# Patient Record
Sex: MALE | Race: WHITE | NOT HISPANIC OR LATINO | URBAN - METROPOLITAN AREA
[De-identification: names, ages, dates, MRNs, and addresses within clinical notes are randomized per-mention and may not be internally consistent; named-entity substitution may affect disease eponyms.]

---

## 2021-09-08 ENCOUNTER — OFFICE VISIT (OUTPATIENT)
Dept: FAMILY MEDICINE | Facility: CLINIC | Age: 31
End: 2021-09-08

## 2021-09-08 ENCOUNTER — TRANSFERRED RECORDS (OUTPATIENT)
Dept: FAMILY MEDICINE | Facility: CLINIC | Age: 31
End: 2021-09-08

## 2021-09-08 VITALS
TEMPERATURE: 97.6 F | SYSTOLIC BLOOD PRESSURE: 120 MMHG | OXYGEN SATURATION: 96 % | DIASTOLIC BLOOD PRESSURE: 76 MMHG | HEIGHT: 70 IN | HEART RATE: 85 BPM | BODY MASS INDEX: 29.46 KG/M2 | WEIGHT: 205.8 LBS

## 2021-09-08 DIAGNOSIS — S46.011A TRAUMATIC INCOMPLETE TEAR OF RIGHT ROTATOR CUFF, INITIAL ENCOUNTER: ICD-10-CM

## 2021-09-08 DIAGNOSIS — M54.50 CHRONIC BILATERAL LOW BACK PAIN WITHOUT SCIATICA: ICD-10-CM

## 2021-09-08 DIAGNOSIS — F33.1 MODERATE EPISODE OF RECURRENT MAJOR DEPRESSIVE DISORDER (H): ICD-10-CM

## 2021-09-08 DIAGNOSIS — R29.5 TRANSIENT PARALYSIS: ICD-10-CM

## 2021-09-08 DIAGNOSIS — F43.10 PTSD (POST-TRAUMATIC STRESS DISORDER): ICD-10-CM

## 2021-09-08 DIAGNOSIS — G89.29 CHRONIC BILATERAL LOW BACK PAIN WITHOUT SCIATICA: ICD-10-CM

## 2021-09-08 DIAGNOSIS — G47.00 INSOMNIA, UNSPECIFIED TYPE: ICD-10-CM

## 2021-09-08 DIAGNOSIS — F41.1 GENERALIZED ANXIETY DISORDER: ICD-10-CM

## 2021-09-08 DIAGNOSIS — J45.20 MILD INTERMITTENT ASTHMA WITHOUT COMPLICATION: ICD-10-CM

## 2021-09-08 DIAGNOSIS — F90.2 ADHD (ATTENTION DEFICIT HYPERACTIVITY DISORDER), COMBINED TYPE: ICD-10-CM

## 2021-09-08 DIAGNOSIS — Z01.818 PREOP GENERAL PHYSICAL EXAM: Primary | ICD-10-CM

## 2021-09-08 PROBLEM — Z76.89 HEALTH CARE HOME: Status: ACTIVE | Noted: 2021-09-08

## 2021-09-08 PROBLEM — Z71.89 ACP (ADVANCE CARE PLANNING): Status: ACTIVE | Noted: 2021-09-08

## 2021-09-08 LAB
BUN SERPL-MCNC: 15 MG/DL (ref 7–25)
BUN/CREATININE RATIO: 9.7 (ref 6–22)
CALCIUM SERPL-MCNC: 9.4 MG/DL (ref 8.6–10.3)
CHLORIDE SERPLBLD-SCNC: 102.4 MMOL/L (ref 98–110)
CO2 SERPL-SCNC: 28 MMOL/L (ref 20–32)
CREAT SERPL-MCNC: 1.54 MG/DL (ref 0.6–1.3)
ERYTHROCYTE [DISTWIDTH] IN BLOOD BY AUTOMATED COUNT: 14.6 %
GFR SERPL CREATININE-BSD FRML MDRD: 59 ML/MIN/1.73M2
GLUCOSE SERPL-MCNC: 88 MG/DL (ref 60–99)
HCT VFR BLD AUTO: 49.1 % (ref 40–53)
HEMOGLOBIN: 16.5 G/DL (ref 13.3–17.7)
MCH RBC QN AUTO: 31.7 PG (ref 26–33)
MCHC RBC AUTO-ENTMCNC: 33.6 G/DL (ref 31–36)
MCV RBC AUTO: 94.5 FL (ref 78–100)
PLATELET COUNT - QUEST: 303 10^9/L (ref 150–375)
POTASSIUM SERPL-SCNC: 4.11 MMOL/L (ref 3.5–5.3)
RBC # BLD AUTO: 5.2 10*12/L (ref 4.4–5.9)
SODIUM SERPL-SCNC: 139.9 MMOL/L (ref 135–146)
WBC # BLD AUTO: 8.8 10*9/L (ref 4–11)

## 2021-09-08 PROCEDURE — 36415 COLL VENOUS BLD VENIPUNCTURE: CPT | Performed by: PHYSICIAN ASSISTANT

## 2021-09-08 PROCEDURE — 85027 COMPLETE CBC AUTOMATED: CPT | Performed by: PHYSICIAN ASSISTANT

## 2021-09-08 PROCEDURE — 99203 OFFICE O/P NEW LOW 30 MIN: CPT | Performed by: PHYSICIAN ASSISTANT

## 2021-09-08 PROCEDURE — 80048 BASIC METABOLIC PNL TOTAL CA: CPT | Performed by: PHYSICIAN ASSISTANT

## 2021-09-08 RX ORDER — PRAZOSIN HYDROCHLORIDE 1 MG/1
1 CAPSULE ORAL AT BEDTIME
Status: ON HOLD | COMMUNITY
Start: 2021-09-08 | End: 2022-08-29

## 2021-09-08 RX ORDER — CLONAZEPAM 1 MG/1
1 TABLET ORAL 2 TIMES DAILY PRN
Qty: 180 TABLET | Status: ON HOLD | COMMUNITY
Start: 2021-09-08 | End: 2022-07-16

## 2021-09-08 RX ORDER — DEXTROAMPHETAMINE SACCHARATE, AMPHETAMINE ASPARTATE MONOHYDRATE, DEXTROAMPHETAMINE SULFATE AND AMPHETAMINE SULFATE 5; 5; 5; 5 MG/1; MG/1; MG/1; MG/1
20 CAPSULE, EXTENDED RELEASE ORAL DAILY
Refills: 0 | Status: ON HOLD | COMMUNITY
Start: 2021-09-08 | End: 2022-07-16

## 2021-09-08 RX ORDER — METHYLPHENIDATE HYDROCHLORIDE 10 MG/1
CAPSULE, EXTENDED RELEASE ORAL
Refills: 0 | Status: CANCELLED | COMMUNITY
Start: 2021-09-08

## 2021-09-08 ASSESSMENT — MIFFLIN-ST. JEOR: SCORE: 1886.81
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
## 2021-09-12 ENCOUNTER — TELEPHONE (OUTPATIENT)
Dept: URGENT CARE | Facility: URGENT CARE | Age: 31
End: 2021-09-12

## 2021-09-12 ENCOUNTER — OFFICE VISIT (OUTPATIENT)
Dept: URGENT CARE | Facility: URGENT CARE | Age: 31
End: 2021-09-12
Payer: COMMERCIAL

## 2021-09-12 VITALS
TEMPERATURE: 98.3 F | OXYGEN SATURATION: 97 % | RESPIRATION RATE: 26 BRPM | HEART RATE: 91 BPM | DIASTOLIC BLOOD PRESSURE: 76 MMHG | SYSTOLIC BLOOD PRESSURE: 144 MMHG

## 2021-09-12 DIAGNOSIS — Z87.898 HX OF FEVER: Primary | ICD-10-CM

## 2021-09-12 LAB — SARS-COV-2 RNA RESP QL NAA+PROBE: POSITIVE

## 2021-09-12 PROCEDURE — 99213 OFFICE O/P EST LOW 20 MIN: CPT | Performed by: FAMILY MEDICINE

## 2021-09-12 PROCEDURE — U0003 INFECTIOUS AGENT DETECTION BY NUCLEIC ACID (DNA OR RNA); SEVERE ACUTE RESPIRATORY SYNDROME CORONAVIRUS 2 (SARS-COV-2) (CORONAVIRUS DISEASE [COVID-19]), AMPLIFIED PROBE TECHNIQUE, MAKING USE OF HIGH THROUGHPUT TECHNOLOGIES AS DESCRIBED BY CMS-2020-01-R: HCPCS | Performed by: FAMILY MEDICINE

## 2021-09-12 PROCEDURE — U0005 INFEC AGEN DETEC AMPLI PROBE: HCPCS | Performed by: FAMILY MEDICINE

## 2021-09-13 NOTE — TELEPHONE ENCOUNTER
"Attempted to call pt, he seemed quite disturbed about me calling at this time as he questioned what time zone I was located in. I mentioned to him after I have him verify his information we can discuss why I'm calling, he did verify himself. He mentioned \"oh I can't have my surgery then i'll have to retest and get it done another time, can I get a retest like tomorrow?\" I told him for any other concerns I couldn't answer he would need to contact his doctor. 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 He was very disruptive in interrupting me while I was trying to talk, he mentioned he feels great \"sounded congested on the phone\" didn't get to asking him anymore questions, as he stated this is a bad time to talk. To notate his account for our dept to return a call to him tomorrow. Ended call.  "

## 2021-09-13 NOTE — TELEPHONE ENCOUNTER
Coronavirus (COVID-19) Notification    Reason for call  Notify of POSITIVE  COVID-19 lab result, assess symptoms,  review [facility] recommendations    Lab Result   Lab test for 2019-nCoV rRt-PCR or SARS-COV-2 PCR  Oropharyngeal AND/OR nasopharyngeal swabs were POSITIVE for 2019-nCoV RNA [OR] SARS-COV-2 RNA (COVID-19) RNA     We have been unable to reach Patient by phone at this time to notify of their Positive COVID-19 result.  Left voicemail message requesting a call back to [phone] [facility] for results.        POSITIVE COVID-19 Letter sent.    Audra Almonte LPN
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
## 2021-10-17 ENCOUNTER — HEALTH MAINTENANCE LETTER (OUTPATIENT)
Age: 31
End: 2021-10-17

## 2021-12-22 ENCOUNTER — HOSPITAL ENCOUNTER (EMERGENCY)
Facility: CLINIC | Age: 31
Discharge: HOME | End: 2021-12-22
Attending: EMERGENCY MEDICINE | Admitting: EMERGENCY MEDICINE
Payer: COMMERCIAL

## 2021-12-22 ENCOUNTER — APPOINTMENT (OUTPATIENT)
Dept: GENERAL RADIOLOGY | Facility: CLINIC | Age: 31
End: 2021-12-22
Attending: EMERGENCY MEDICINE
Payer: COMMERCIAL

## 2021-12-22 VITALS
TEMPERATURE: 98.8 F | HEART RATE: 101 BPM | DIASTOLIC BLOOD PRESSURE: 85 MMHG | SYSTOLIC BLOOD PRESSURE: 142 MMHG | RESPIRATION RATE: 16 BRPM | OXYGEN SATURATION: 99 %

## 2021-12-22 DIAGNOSIS — Z98.890 S/P RIGHT ROTATOR CUFF REPAIR: ICD-10-CM

## 2021-12-22 DIAGNOSIS — Z51.89 VISIT FOR WOUND CHECK: ICD-10-CM

## 2021-12-22 PROCEDURE — 70140 X-RAY EXAM OF FACIAL BONES: CPT

## 2021-12-22 PROCEDURE — 250N000013 HC RX MED GY IP 250 OP 250 PS 637: Performed by: EMERGENCY MEDICINE

## 2021-12-22 PROCEDURE — 99283 EMERGENCY DEPT VISIT LOW MDM: CPT | Mod: 25

## 2021-12-22 PROCEDURE — 10120 INC&RMVL FB SUBQ TISS SMPL: CPT

## 2021-12-22 RX ORDER — ACETAMINOPHEN 500 MG
1000 TABLET ORAL ONCE
Status: COMPLETED | OUTPATIENT
Start: 2021-12-22 | End: 2021-12-22

## 2021-12-22 RX ORDER — LIDOCAINE HYDROCHLORIDE AND EPINEPHRINE 10; 10 MG/ML; UG/ML
INJECTION, SOLUTION INFILTRATION; PERINEURAL
Status: DISCONTINUED
Start: 2021-12-22 | End: 2021-12-22 | Stop reason: HOSPADM

## 2021-12-22 RX ORDER — OXYCODONE HYDROCHLORIDE 5 MG/1
5 TABLET ORAL ONCE
Status: COMPLETED | OUTPATIENT
Start: 2021-12-22 | End: 2021-12-22

## 2021-12-22 RX ADMIN — ACETAMINOPHEN 1000 MG: 500 TABLET, FILM COATED ORAL at 02:42

## 2021-12-22 RX ADMIN — OXYCODONE HYDROCHLORIDE 5 MG: 5 TABLET ORAL at 03:43

## 2021-12-22 ASSESSMENT — ENCOUNTER SYMPTOMS: WOUND: 1
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
## 2022-02-04 ENCOUNTER — HOSPITAL ENCOUNTER (EMERGENCY)
Facility: CLINIC | Age: 32
Discharge: HOME | End: 2022-02-04
Payer: COMMERCIAL

## 2022-02-04 ENCOUNTER — HOSPITAL ENCOUNTER (EMERGENCY)
Facility: CLINIC | Age: 32
Discharge: HOME | End: 2022-02-04
Attending: EMERGENCY MEDICINE | Admitting: EMERGENCY MEDICINE
Payer: COMMERCIAL

## 2022-02-04 VITALS
SYSTOLIC BLOOD PRESSURE: 158 MMHG | OXYGEN SATURATION: 99 % | BODY MASS INDEX: 28.63 KG/M2 | HEIGHT: 70 IN | TEMPERATURE: 98 F | WEIGHT: 200 LBS | RESPIRATION RATE: 16 BRPM | DIASTOLIC BLOOD PRESSURE: 92 MMHG | HEART RATE: 99 BPM

## 2022-02-04 DIAGNOSIS — R11.0 NAUSEA: Primary | ICD-10-CM

## 2022-02-04 PROCEDURE — 99282 EMERGENCY DEPT VISIT SF MDM: CPT | Performed by: EMERGENCY MEDICINE

## 2022-02-04 ASSESSMENT — ENCOUNTER SYMPTOMS
FLANK PAIN: 0
WOUND: 0
ABDOMINAL PAIN: 0
NECK PAIN: 0
FREQUENCY: 0
CONFUSION: 0
SHORTNESS OF BREATH: 0
HEMATURIA: 0
COUGH: 0
BACK PAIN: 0
BRUISES/BLEEDS EASILY: 0
NAUSEA: 1
APPETITE CHANGE: 0
FEVER: 0
FATIGUE: 0
PALPITATIONS: 0
NUMBNESS: 0
COLOR CHANGE: 0
VOMITING: 0
RHINORRHEA: 0
DYSURIA: 0
DIARRHEA: 0
LIGHT-HEADEDNESS: 0
CONSTIPATION: 0
HEADACHES: 0
CHILLS: 0
WEAKNESS: 0

## 2022-02-04 ASSESSMENT — MIFFLIN-ST. JEOR: SCORE: 1863.44
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
## 2022-02-05 NOTE — ED NOTES
"Pt Requesting to speak with provider, denies wanting to speak with a mental health  about housing situation. States he is here because he \"does not feel right\" and wants a medical evaluation  "
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
## 2022-02-05 NOTE — ED NOTES
Patient expressed that he'd like to go to a different hospital after speaking with provider. MD notified.

## 2022-02-05 NOTE — ED NOTES
MD notified that patient is back again after being discharged. MD medically evaluated and stated that we can offer a DEC eval if patient needs further help

## 2022-02-06 ENCOUNTER — HEALTH MAINTENANCE LETTER (OUTPATIENT)
Age: 32
End: 2022-02-06

## 2022-04-21 ENCOUNTER — MEDICAL CORRESPONDENCE (OUTPATIENT)
Dept: HEALTH INFORMATION MANAGEMENT | Facility: CLINIC | Age: 32
End: 2022-04-21

## 2022-04-22 ENCOUNTER — MEDICAL CORRESPONDENCE (OUTPATIENT)
Dept: HEALTH INFORMATION MANAGEMENT | Facility: CLINIC | Age: 32
End: 2022-04-22

## 2022-05-04 ENCOUNTER — MEDICAL CORRESPONDENCE (OUTPATIENT)
Dept: HEALTH INFORMATION MANAGEMENT | Facility: CLINIC | Age: 32
End: 2022-05-04

## 2022-06-14 ENCOUNTER — MEDICAL CORRESPONDENCE (OUTPATIENT)
Dept: HEALTH INFORMATION MANAGEMENT | Facility: CLINIC | Age: 32
End: 2022-06-14

## 2022-07-14 ENCOUNTER — HOSPITAL ENCOUNTER (INPATIENT)
Facility: CLINIC | Age: 32
LOS: 44 days | Discharge: PSYCHIATRIC OR SUBSTANCE USE FACILITY | DRG: 897 | End: 2022-08-29
Attending: EMERGENCY MEDICINE | Admitting: PSYCHIATRY & NEUROLOGY
Payer: MEDICARE

## 2022-07-14 ENCOUNTER — TELEPHONE (OUTPATIENT)
Dept: BEHAVIORAL HEALTH | Facility: CLINIC | Age: 32
End: 2022-07-14

## 2022-07-14 DIAGNOSIS — F33.1 MAJOR DEPRESSIVE DISORDER, RECURRENT EPISODE, MODERATE (H): ICD-10-CM

## 2022-07-14 DIAGNOSIS — F41.1 GENERALIZED ANXIETY DISORDER: ICD-10-CM

## 2022-07-14 DIAGNOSIS — G89.29 CHRONIC BILATERAL LOW BACK PAIN WITHOUT SCIATICA: ICD-10-CM

## 2022-07-14 DIAGNOSIS — F03.92: ICD-10-CM

## 2022-07-14 DIAGNOSIS — F43.10 PTSD (POST-TRAUMATIC STRESS DISORDER): ICD-10-CM

## 2022-07-14 DIAGNOSIS — M54.50 CHRONIC BILATERAL LOW BACK PAIN WITHOUT SCIATICA: ICD-10-CM

## 2022-07-14 DIAGNOSIS — K21.9 GASTROESOPHAGEAL REFLUX DISEASE WITHOUT ESOPHAGITIS: ICD-10-CM

## 2022-07-14 DIAGNOSIS — F29 PSYCHOSIS, UNSPECIFIED PSYCHOSIS TYPE (H): ICD-10-CM

## 2022-07-14 DIAGNOSIS — F33.1 MODERATE EPISODE OF RECURRENT MAJOR DEPRESSIVE DISORDER (H): ICD-10-CM

## 2022-07-14 DIAGNOSIS — J45.20 MILD INTERMITTENT ASTHMA WITHOUT COMPLICATION: ICD-10-CM

## 2022-07-14 DIAGNOSIS — F14.222: ICD-10-CM

## 2022-07-14 DIAGNOSIS — G47.00 INSOMNIA, UNSPECIFIED TYPE: Primary | ICD-10-CM

## 2022-07-14 DIAGNOSIS — F11.20 UNCOMPLICATED OPIOID DEPENDENCE (H): ICD-10-CM

## 2022-07-14 LAB
AMPHETAMINES UR QL SCN: ABNORMAL
ATRIAL RATE - MUSE: 108 BPM
BARBITURATES UR QL SCN: ABNORMAL
BENZODIAZ UR QL SCN: ABNORMAL
BZE UR QL SCN: ABNORMAL
CANNABINOIDS UR QL SCN: ABNORMAL
DIASTOLIC BLOOD PRESSURE - MUSE: NORMAL MMHG
INTERPRETATION ECG - MUSE: NORMAL
OPIATES UR QL SCN: ABNORMAL
P AXIS - MUSE: 70 DEGREES
PR INTERVAL - MUSE: 128 MS
QRS DURATION - MUSE: 86 MS
QT - MUSE: 464 MS
QTC - MUSE: 621 MS
R AXIS - MUSE: 13 DEGREES
SARS-COV-2 RNA RESP QL NAA+PROBE: NEGATIVE
SYSTOLIC BLOOD PRESSURE - MUSE: NORMAL MMHG
T AXIS - MUSE: 65 DEGREES
VENTRICULAR RATE- MUSE: 108 BPM

## 2022-07-14 PROCEDURE — 250N000013 HC RX MED GY IP 250 OP 250 PS 637: Performed by: EMERGENCY MEDICINE

## 2022-07-14 PROCEDURE — 90791 PSYCH DIAGNOSTIC EVALUATION: CPT

## 2022-07-14 PROCEDURE — 80307 DRUG TEST PRSMV CHEM ANLYZR: CPT | Performed by: EMERGENCY MEDICINE

## 2022-07-14 PROCEDURE — U0005 INFEC AGEN DETEC AMPLI PROBE: HCPCS | Performed by: EMERGENCY MEDICINE

## 2022-07-14 RX ORDER — OLANZAPINE 5 MG/1
5 TABLET, ORALLY DISINTEGRATING ORAL ONCE
Status: DISCONTINUED | OUTPATIENT
Start: 2022-07-14 | End: 2022-07-16

## 2022-07-14 RX ORDER — DIAZEPAM 5 MG
5 TABLET ORAL ONCE
Status: COMPLETED | OUTPATIENT
Start: 2022-07-14 | End: 2022-07-14

## 2022-07-14 RX ORDER — ACETAMINOPHEN 500 MG
1000 TABLET ORAL ONCE
Status: COMPLETED | OUTPATIENT
Start: 2022-07-14 | End: 2022-07-14

## 2022-07-14 RX ADMIN — ACETAMINOPHEN 1000 MG: 500 TABLET ORAL at 10:00

## 2022-07-14 RX ADMIN — DIAZEPAM 5 MG: 5 TABLET ORAL at 12:08

## 2022-07-14 RX ADMIN — DIAZEPAM 5 MG: 5 TABLET ORAL at 16:18
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
## 2022-07-14 NOTE — CONSULTS
"Diagnostic Evaluation Consultation  Crisis Assessment    Patient was assessed: remote  Patient location: [place]  Was a release of information signed: No. Reason: patient too altered      Referral Data and Chief Complaint  Patient  is a  31 year old, who uses he/him pronouns, and presents to the ED via EMS. Patient is referred to the ED by other: police/EMS. Patient is presenting to the ED for the following concerns: patient was observed wandering and highly anxious in a local drugs store and then into traffic, civilians called 911, and patient brought to the ED for further assessment.      Informed Consent and Assessment Methods     Patient is his own guardian. Writer met with patient and explained the crisis assessment process, including applicable information disclosures and limits to confidentiality, assessed understanding of the process, and obtained consent to proceed with the assessment. Patient was observed to be able to participate in the assessment as evidenced by his ability to verbally confirm. Assessment methods included conducting a formal interview with patient, review of medical records, collaboration with medical staff, and obtaining relevant collateral information from family and community providers when available..     Over the course of this crisis assessment provided reassurance and offered validation. Patient's response to interventions was ambivalent.     Summary of Patient Situation    Patient presents as being highly anxious and paranoid while being unable to converse for most of this evaluation. His eyes are rapidly scanning the entire room, he is tearful, vigorously rubbing his head, and struggles to respond coherently.     There is no record of his having crisis/emergency supports previously, and patient states \"I am fine, I just have a lot on my mind.\"    Brief Psychosocial History    Due to patient's state, limited information is available. It is unknown where he lives, facesheet " states he works at [employer], and patient does not indicate that there are legal or spiritual issues at play here.     Significant Clinical History    Health record indicates a history of bipolar II, PTSD, psychosis, polysubstance abuse, depression and anxiety. He currently has a psychiatrist and does not reveal the origin of his PTSD other than sharing that it is historical. Denies prior admissions and does not have a OP tx at this time.      Collateral Information    Attempts were made to call patient's mother, with no return call     Risk Assessment  ESS-6  1.a. Over the past 2 weeks, have you had thoughts of killing yourself? No  1.b. Have you ever attempted to kill yourself and, if yes, when did this last happen? No   2. Recent or current suicide plan? No   3. Recent or current intent to act on ideation? No  4. Lifetime psychiatric hospitalization? No  5. Pattern of excessive substance use? Yes  6. Current irritability, agitation, or aggression? Yes  Scoring note: BOTH 1a and 1b must be yes for it to score 1 point, if both are not yes it is zero. All others are 1 point per number. If all questions 1a/1b - 6 are no, risk is negligible. If one of 1a/1b is yes, then risk is mild. If either question 2 or 3, but not both, is yes, then risk is automatically moderate regardless of total score. If both 2 and 3 are yes, risk is automatically high regardless of total score.      Score: 2, high risk      Does the patient have access to lethal means? No     Does the patient engage in non-suicidal self-injurious behavior (NSSI/SIB)? no     Does the patient have thoughts of harming others? No     Is the patient engaging in sexually inappropriate behavior?  no        Current Substance Abuse     Is there recent substance abuse? Patient has a hx of heroin, cannabis and opiod abuse. Currently he presents with cocaine and amphetamine, per lab results     Was a urine drug screen or blood alcohol level obtained: Yes see above        Mental Status Exam     Affect: Labile   Appearance: Disheveled    Attention Span/Concentration: Inattentive  Eye Contact: Avoidant   Fund of Knowledge: Delayed    Language /Speech Content: Expressive Speech   Language /Speech Volume: Soft    Language /Speech Rate/Productions: Pressured    Recent Memory: Poor   Remote Memory: Poor   Mood: Anxious, Depressed and Irritable    Orientation to Person: No    Orientation to Place: No   Orientation to Time of Day: Yes    Orientation to Date: Yes    Situation (Do they understand why they are here?): No    Psychomotor Behavior: Hyperactive    Thought Content: Hallucinations, Paranoia and Suicidal   Thought Form: Loose Associations and Tangential      History of commitment: No     Medication    Psychotropic medications: Yes. Pt is currently taking valium, per patient report. Medication compliant: Yes. Recent medication changes: No  Medication changes made in the last two weeks: No       Current Care Team    Primary Care Provider: No  Psychiatrist: Yes. Name: Erinn Maradiaga. [facility] Date of last visit: unknown. Frequency: unknown. Perceived helpfulness: unknown.  Therapist: No  : No  CTSS or ARMHS: No  ACT Team: No  Other: No    Diagnosis    Substance-Related & Addictive Disorders 292.89 Stimulant Intoxication,  292.89 Cocaine, With perceptual disturbances:  (F14.222) With use disorder, moderate or severe   296.32 (F33.1) Major Depressive Disorder, Recurrent Episode, Moderate _ and With anxious distress - by history   Brief psychotic disorder, paranoia  Generalized anxiety, by hx  PTSD      Clinical Summary and Substantiation of Recommendations    Patient presents with a combination of MI/CD features that offer acute risk as evidenced by his compromised cognition, potential suicide attempt or lack of regard for safety of self, and continued polysubstance abuse. He also admits to self-administering testosterone, without prescription, for non-disclosed  benefits. While it is unclear at this time which variables have the most impact on patient's presentation, regardless he is of high risk, cannot process clearly, is tearful and tangential, and cannot contract for safety.     Disposition    Recommended disposition: Inpatient Mental Health       Reviewed case and recommendations with attending provider. Attending Name: Dr. Magallon       Attending concurs with disposition: Yes       Patient concurs with disposition: No: prefers discharge, on ZAKIYA, will be shifted to 72 hour hold if needed       Guardian concurs with disposition: NA      Final disposition: Inpatient mental health .     Inpatient Details (if applicable):  Is patient admitted voluntarily:on ZAKIYA now, will be flipped to 72 if needed      Patient aware of potential for transfer if there is not appropriate placement? Yes       Patient is willing to travel outside of the [facility] for placement? Yes      Behavioral Intake Notified? Yes: Date: 7/14/22 Time: 1:40pm.     Assessment Details    Patient interview started at: 1:20pm and completed at: 2:00pm.     Total duration spent on the patient case in minutes: 2.0 hrs      CPT code(s) utilized: 37832 - Psychotherapy for Crisis - 60 (30-74*) min       Ron Barba, LICSW, MSW, LICSW  DEC - Triage & Transition Services

## 2022-07-14 NOTE — ED NOTES
DEC provided clinical to  central intake and patient placed on wait list.     Crisis assessment will be uploaded shortly and attempts were made to call patient's mother for further collateral, with no call back at this time.

## 2022-07-14 NOTE — TELEPHONE ENCOUNTER
S: 1:41pm Francisco w/ DEC called Intake w/ clinical on a 31/M present in the [place] ER w/ psychosis.     B:  The pt is currently at the [facility] BIB EMS after pt was found wondering aimlessly in a local [employer], and observed to be walking into traffic. Pt was found to be in position of a needle, and a bottle of testosterone that he bought on the street. Pt presents highly paranoid, anxious, agitated, tangential and tearful. Pt denies AH/VH;  reports inability to make eye contact and possibly responding to internal stimuli. Denies SI, and HI.    Guardian: Self    The pts MH Hx is as follows: intermittent and brief psychosis, bipolar 2, anxiety, depression and polysubstance use.    The pt endorse using any substances.- THC, heroine, Opioids- Denying any current use.     The pt denies being IP for MH before.    The pt is Rx MH medications: Unsure. The pt is not complaint.     OP Services: Psychiatrist: Ernin watts ([facility]).     There is no concern for aggression this visit. There is no concern for HI.     Per  the pt can ambulate independently.     Per  the pt is indep with ADLs.    The pt does not have any known medical concerns.     Covid needs to be collected.  Utox; Cocaine and amphetamine.     A: remy SIGALA.     R: The pt is currently in the [place] ER awaiting bed placement.    1:50pm Pt has been added to the work-list. Intake waiting labs for bed placement. Intake working on identifying appropriate bed placement.

## 2022-07-14 NOTE — PLAN OF CARE
Goal Outcome Evaluation: no change    Temp: 97.5  F (36.4  C) Temp src: Axillary BP: 110/62 Pulse: 85   Resp: 16 SpO2: 99 % O2 Device: None (Room air)       Alert. Appears restless and pacing around the room ~ 4pm. Gave valium 5mg po x1. Slept after receiving valium. Sitter monitoring patient for suicidal ideation

## 2022-07-14 NOTE — ED TRIAGE NOTES
Pt arrives w/ police escort after bystanders noted patient to be wandering [facility], walking into traffic. Of note, patient is extremely anxious. Denies suicidal, homicidal ideations. Pt searched by security, belongings secured. Denies pain. Notably needles and testosterone found in patient belongings. Denies illicit drug use.

## 2022-07-14 NOTE — ED NOTES
See Mendenhall  July 14, 2022  SAFE Note    Critical Safety Issues: found walking in traffic, suicidal      Current Suicidal Ideation/Self-Injurious Concerns/Methods: Jumping (from building, into traffic, etc.)      Current or Historical Inappropriate Sexual Behavior: No      Current or Historical Aggression/Homicidal Ideation: None - N/A      Triggers: Unknown    Guardianship Status: is his own guardian.. Guardianship paperwork is not required.    This patient is a child/adolescent: No    This patient has additional special visitor precautions: No    Updated care team: Yes    For additional details see full LMHP assessment.       Ron Barba, LICSW

## 2022-07-15 ENCOUNTER — TELEPHONE (OUTPATIENT)
Dept: BEHAVIORAL HEALTH | Facility: CLINIC | Age: 32
End: 2022-07-15

## 2022-07-15 PROCEDURE — 93010 ELECTROCARDIOGRAM REPORT: CPT | Performed by: EMERGENCY MEDICINE

## 2022-07-15 PROCEDURE — 99285 EMERGENCY DEPT VISIT HI MDM: CPT | Performed by: EMERGENCY MEDICINE

## 2022-07-15 PROCEDURE — C9803 HOPD COVID-19 SPEC COLLECT: HCPCS | Performed by: EMERGENCY MEDICINE

## 2022-07-15 PROCEDURE — 250N000013 HC RX MED GY IP 250 OP 250 PS 637: Performed by: EMERGENCY MEDICINE

## 2022-07-15 PROCEDURE — 99285 EMERGENCY DEPT VISIT HI MDM: CPT | Mod: 25 | Performed by: EMERGENCY MEDICINE

## 2022-07-15 PROCEDURE — 93005 ELECTROCARDIOGRAM TRACING: CPT | Performed by: EMERGENCY MEDICINE

## 2022-07-15 RX ORDER — OLANZAPINE 5 MG/1
5 TABLET, ORALLY DISINTEGRATING ORAL ONCE
Status: DISCONTINUED | OUTPATIENT
Start: 2022-07-15 | End: 2022-07-16

## 2022-07-15 NOTE — ED NOTES
Bed: ED12  Expected date: 7/15/22  Expected time: 6:09 AM  Means of arrival:   Comments:  Hold U transfer

## 2022-07-15 NOTE — ED PROVIDER NOTES
[facility] ED Mental Health Handoff Note:       Brief HPI:  Patient was signed out to me by previous physician see initial ED Provider note for full details of the presentation.   Home meds reviewed and ordered/administered: Yes    Medically stable for inpatient mental health admission: Yes.    Evaluated by mental health: Yes. The recommendation is for inpatient mental health treatment. Bed search in process    Safety concerns: At the time I received sign out, there were no safety concerns.        Emergency department course:  Patient was signed out to me by previous physician.  Currently awaiting transfer or admission for mental health.  Patient was sleeping comfortably overnight during my shift.  There were no issues during my shift.  Patient care will be signed out to the oncoming physician.       Néstor Meeks,   07/15/22 1557

## 2022-07-15 NOTE — PROGRESS NOTES
[facility] Crisis Reassessment      See Mendenhall was reassessed at the request of [facility] for the following reasons: possible dispo change. Pt was first seen on 7/14/22 by Francisco Barba; see the initial assessment note for details.      Patient Presentation    Initial ED presentation details: Delusional, paranoid, erratic behavior, altered mental status with unclear risk to self as reported pt was wandering in traffic    Current patient presentation: Pt continues to be delusional, disorganized, tangential.  Pt is not a reliable historian.  Labile mood.  Confused at times.  Pt is denying suicidal ideation and homicidal ideation.      Changes observed since initial assessment: Pt currently denying SI or HI, however, also states he never expressed any SI when initially assessed.  Pt insisting he went to police with his concerns and public did not call 911 due to his behaviors.      Risk of Harm    Is the patient experiencing current suicidal ideation: No    Does the patient have thoughts of harming others? No      Mental Status Exam   Affect: Labile   Appearance: Appropriate    Attention Span/Concentration: Inattentive?    Eye Contact: Variable   Fund of Knowledge: Appropriate    Language /Speech Content: Fluent   Language /Speech Volume: Normal    Language /Speech Rate/Productions: Pressured    Recent Memory: Variable   Remote Memory: Variable   Mood: Anxious and Euphoric    Orientation to Person: Yes    Orientation to Place: Yes   Orientation to Time of Day: Answer: not asked    Orientation to Date: Answer: not asked    Situation (Do they understand why they are here?): Answer: pt confused    Psychomotor Behavior: Hyperactive    Thought Content: Delusions   Thought Form: Obsessive/Perseverative and Tangential       Additional Collateral Information   Calls made to pt's mother [phone], sister Samaria [phone], Brother Khalif [phone].  Messages left,  has not received any calls back.      Therapeutic  Intervention  The following therapeutic methodologies were employed when working with the patient: Establishing rapport, Active listening and Apply solution-focused therapy to address current crisis. Patient response to intervention: Pt response is varied with labile affect.    Disposition  Recommended disposition: Inpatient Mental Health    Reviewed case and recommendations with attending provider. Attending Name: Dr Magallon      Attending concurs with disposition: Yes      Patient concurs with disposition: No: pt talks of daughter's birthday, probation, treatment and needing to be discharged      Final disposition: Inpatient mental health .       Clinical Substantiation of Recommendations  Pt continues to exhibit confused and disorganized thoughts with delusional content.  Pt is unable at this time to thoughtfully participate in safety planning due to altered mental status.   also unable to establish any collateral or verify any outpatient programming.    Assessment Details  Total duration spent on the patient case in minutes: 1.50 hrs     CPT code(s) utilized: 09203 - Psychotherapy for Crisis - 60 (30-74*) min       Nicolasa Roberts

## 2022-07-15 NOTE — TELEPHONE ENCOUNTER
"R: per bed search:  [facility]: @ cap per website  [facility]: @ cap per website  [facility]: @ cap per website  [facility]: @ cap per website  [facility]: @ cap per website  [place]: @ cap per website  [facility]: @ cap per website    [facility]: per call to Brittanie, they have many patients to review who are ahead of pt so they asked we call back later today.    [place]: @ cap per website  [place]: @ cap per website  [facility] [place]: low acuity only  [facility] [place]: @ cap per website  [place] [facility]: low acuity only  [place]: @ cap per website  "[facility] [facility]: low acuity only  [facility]: @ cap per website  [place] [facility]: low acuity only  [facility]: @ cap per website  [facility]: @ cap per website  [facility]: @ cap per website    [facility]:numerous calls not going through to [facility]N- phone rings many times and then recording says \"Call can not go through. Please try again later.\" Per call to , calls go to a voice mail. Author left voice message for [facility] asking for a call back.    [facility]: per call to Johnnie at 2:35 pm, they can review pt. Author faxed clinical, labs and face sheet to them for review.     Passed to farrah mahajan. ilir  "

## 2022-07-15 NOTE — PROGRESS NOTES
0637 Writer read patient his patient rights for being on a 72 hour hold. Transport arranged for patient to transfer to [facility] ED. Writer offered patient zyprexa prior to transporting. Patient refused. As patient was not previously read patient rights nor was there documentation.       Celeste with [facility],Transported patient to [facility].

## 2022-07-15 NOTE — PROGRESS NOTES
Patient sleeping off and on, continues to be observed by 1:1. Writer spoke with ED MD, patient can have regular diet, if needed. VSS. Last at 1800.

## 2022-07-15 NOTE — ED NOTES
Triage & Transition Services, Extended Care     See Mendenhall  July 15, 2022    See is followed related to Boarding Status. Please see initial DEC Crisis Assessment completed for complete assessment information. Medical record is reviewed. While patient is in the ED, care team is working towards Learn and Demonstrate at [facility] [employer] Focused on Crisis Stabilization. Additional notes include: possible dispo change and in need of a reassessment.    Writer attempted to meet with patient in the ED for a reassessment at 12:30pm. During this time, patient was asleep and groggy, attempting to wake up. Writer did not engage in a reassessment with patient at this time, due to him being asleep. Writer requested with the [facility] Coordinators for a reassessment to be triaged to another  when available.    Per attending RN, patient no endorsing any SI/SIB/HI and indicated wanting to return to the community to see his family.    There are significant status changes. Full DEC Reassessment is recommended at this time. Extended Care continues to be available for review of changes to initial crisis state resulting in this encounter.     Care Plan and Current Recommendations:  Patient needing a reassessment.    Extended Care will follow and meet with patient/family/care team as able or requested.     LITO KENNEY, PANFILO, LGSW, [facility], Extended Care   [phone]

## 2022-07-15 NOTE — ED NOTES
Pt arrived via ems from [facility]. Pt is slightly hyperverbal and pacing. Pt talks about going to daughters birthday party. Pt is cooperative.

## 2022-07-16 PROBLEM — F29 PSYCHOSIS, UNSPECIFIED PSYCHOSIS TYPE (H): Status: ACTIVE | Noted: 2022-07-16

## 2022-07-16 LAB
ALBUMIN SERPL-MCNC: 3.5 G/DL (ref 3.4–5)
ALP SERPL-CCNC: 68 U/L (ref 40–150)
ALT SERPL W P-5'-P-CCNC: 29 U/L (ref 0–70)
ANION GAP SERPL CALCULATED.3IONS-SCNC: 7 MMOL/L (ref 3–14)
AST SERPL W P-5'-P-CCNC: 18 U/L (ref 0–45)
BASOPHILS # BLD AUTO: 0 10E3/UL (ref 0–0.2)
BASOPHILS NFR BLD AUTO: 0 %
BILIRUB SERPL-MCNC: 0.4 MG/DL (ref 0.2–1.3)
BUN SERPL-MCNC: 18 MG/DL (ref 7–30)
CALCIUM SERPL-MCNC: 8.5 MG/DL (ref 8.5–10.1)
CHLORIDE BLD-SCNC: 107 MMOL/L (ref 94–109)
CHOLEST SERPL-MCNC: 123 MG/DL
CO2 SERPL-SCNC: 26 MMOL/L (ref 20–32)
CREAT SERPL-MCNC: 0.77 MG/DL (ref 0.66–1.25)
EOSINOPHIL # BLD AUTO: 0.2 10E3/UL (ref 0–0.7)
EOSINOPHIL NFR BLD AUTO: 3 %
ERYTHROCYTE [DISTWIDTH] IN BLOOD BY AUTOMATED COUNT: 13.3 % (ref 10–15)
GFR SERPL CREATININE-BSD FRML MDRD: >90 ML/MIN/1.73M2
GLUCOSE BLD-MCNC: 90 MG/DL (ref 70–99)
HBA1C MFR BLD: 5.8 % (ref 0–5.6)
HCT VFR BLD AUTO: 38.4 % (ref 40–53)
HDLC SERPL-MCNC: 46 MG/DL
HGB BLD-MCNC: 12.8 G/DL (ref 13.3–17.7)
IMM GRANULOCYTES # BLD: 0 10E3/UL
IMM GRANULOCYTES NFR BLD: 0 %
LDLC SERPL CALC-MCNC: 54 MG/DL
LYMPHOCYTES # BLD AUTO: 2.2 10E3/UL (ref 0.8–5.3)
LYMPHOCYTES NFR BLD AUTO: 39 %
MCH RBC QN AUTO: 30.3 PG (ref 26.5–33)
MCHC RBC AUTO-ENTMCNC: 33.3 G/DL (ref 31.5–36.5)
MCV RBC AUTO: 91 FL (ref 78–100)
MONOCYTES # BLD AUTO: 0.5 10E3/UL (ref 0–1.3)
MONOCYTES NFR BLD AUTO: 9 %
NEUTROPHILS # BLD AUTO: 2.7 10E3/UL (ref 1.6–8.3)
NEUTROPHILS NFR BLD AUTO: 49 %
NONHDLC SERPL-MCNC: 77 MG/DL
NRBC # BLD AUTO: 0 10E3/UL
NRBC BLD AUTO-RTO: 0 /100
PLATELET # BLD AUTO: 210 10E3/UL (ref 150–450)
POTASSIUM BLD-SCNC: 4.1 MMOL/L (ref 3.4–5.3)
PROT SERPL-MCNC: 6.7 G/DL (ref 6.8–8.8)
RBC # BLD AUTO: 4.23 10E6/UL (ref 4.4–5.9)
SODIUM SERPL-SCNC: 140 MMOL/L (ref 133–144)
TRIGL SERPL-MCNC: 116 MG/DL
TSH SERPL DL<=0.005 MIU/L-ACNC: 0.57 MU/L (ref 0.4–4)
VIT B12 SERPL-MCNC: 515 PG/ML (ref 232–1245)
WBC # BLD AUTO: 5.6 10E3/UL (ref 4–11)

## 2022-07-16 PROCEDURE — 80053 COMPREHEN METABOLIC PANEL: CPT | Performed by: PSYCHIATRY & NEUROLOGY

## 2022-07-16 PROCEDURE — 250N000013 HC RX MED GY IP 250 OP 250 PS 637: Performed by: PSYCHIATRY & NEUROLOGY

## 2022-07-16 PROCEDURE — 86780 TREPONEMA PALLIDUM: CPT | Performed by: PSYCHIATRY & NEUROLOGY

## 2022-07-16 PROCEDURE — 99223 1ST HOSP IP/OBS HIGH 75: CPT | Mod: AI | Performed by: PSYCHIATRY & NEUROLOGY

## 2022-07-16 PROCEDURE — 93010 ELECTROCARDIOGRAM REPORT: CPT | Performed by: INTERNAL MEDICINE

## 2022-07-16 PROCEDURE — 82390 ASSAY OF CERULOPLASMIN: CPT | Performed by: PSYCHIATRY & NEUROLOGY

## 2022-07-16 PROCEDURE — 84443 ASSAY THYROID STIM HORMONE: CPT | Performed by: PSYCHIATRY & NEUROLOGY

## 2022-07-16 PROCEDURE — 36415 COLL VENOUS BLD VENIPUNCTURE: CPT | Performed by: PSYCHIATRY & NEUROLOGY

## 2022-07-16 PROCEDURE — 87389 HIV-1 AG W/HIV-1&-2 AB AG IA: CPT | Performed by: PSYCHIATRY & NEUROLOGY

## 2022-07-16 PROCEDURE — 86618 LYME DISEASE ANTIBODY: CPT | Performed by: PSYCHIATRY & NEUROLOGY

## 2022-07-16 PROCEDURE — 80061 LIPID PANEL: CPT | Performed by: PSYCHIATRY & NEUROLOGY

## 2022-07-16 PROCEDURE — 93005 ELECTROCARDIOGRAM TRACING: CPT

## 2022-07-16 PROCEDURE — 82607 VITAMIN B-12: CPT | Performed by: PSYCHIATRY & NEUROLOGY

## 2022-07-16 PROCEDURE — 124N000002 HC R&B MH UMMC

## 2022-07-16 PROCEDURE — 85025 COMPLETE CBC W/AUTO DIFF WBC: CPT | Performed by: PSYCHIATRY & NEUROLOGY

## 2022-07-16 PROCEDURE — 83036 HEMOGLOBIN GLYCOSYLATED A1C: CPT | Performed by: PSYCHIATRY & NEUROLOGY

## 2022-07-16 PROCEDURE — HZ2ZZZZ DETOXIFICATION SERVICES FOR SUBSTANCE ABUSE TREATMENT: ICD-10-PCS | Performed by: PSYCHIATRY & NEUROLOGY

## 2022-07-16 RX ORDER — DIAZEPAM 5 MG
5-20 TABLET ORAL EVERY 30 MIN PRN
Status: DISCONTINUED | OUTPATIENT
Start: 2022-07-16 | End: 2022-07-17

## 2022-07-16 RX ORDER — POLYETHYLENE GLYCOL 3350 17 G/17G
17 POWDER, FOR SOLUTION ORAL DAILY
Status: DISCONTINUED | OUTPATIENT
Start: 2022-07-17 | End: 2022-08-29 | Stop reason: HOSPADM

## 2022-07-16 RX ORDER — MAGNESIUM HYDROXIDE/ALUMINUM HYDROXICE/SIMETHICONE 120; 1200; 1200 MG/30ML; MG/30ML; MG/30ML
30 SUSPENSION ORAL EVERY 4 HOURS PRN
Status: DISCONTINUED | OUTPATIENT
Start: 2022-07-16 | End: 2022-08-29 | Stop reason: HOSPADM

## 2022-07-16 RX ORDER — OLANZAPINE 10 MG/2ML
10 INJECTION, POWDER, FOR SOLUTION INTRAMUSCULAR 3 TIMES DAILY PRN
Status: DISCONTINUED | OUTPATIENT
Start: 2022-07-16 | End: 2022-07-18

## 2022-07-16 RX ORDER — LIDOCAINE 40 MG/G
CREAM TOPICAL 2 TIMES DAILY PRN
Status: DISCONTINUED | OUTPATIENT
Start: 2022-07-16 | End: 2022-08-10

## 2022-07-16 RX ORDER — TRAZODONE HYDROCHLORIDE 50 MG/1
50 TABLET, FILM COATED ORAL
Status: DISCONTINUED | OUTPATIENT
Start: 2022-07-16 | End: 2022-07-16

## 2022-07-16 RX ORDER — ANASTROZOLE 1 MG/1
1 TABLET ORAL DAILY
Status: ON HOLD | COMMUNITY
End: 2022-08-29

## 2022-07-16 RX ORDER — HYDROXYZINE HYDROCHLORIDE 25 MG/1
25 TABLET, FILM COATED ORAL EVERY 4 HOURS PRN
Status: DISCONTINUED | OUTPATIENT
Start: 2022-07-16 | End: 2022-08-29 | Stop reason: HOSPADM

## 2022-07-16 RX ORDER — TESTOSTERONE CYPIONATE 200 MG/ML
200 INJECTION, SOLUTION INTRAMUSCULAR WEEKLY
Status: ON HOLD | COMMUNITY
End: 2022-08-29

## 2022-07-16 RX ORDER — ACETAMINOPHEN 325 MG/1
650 TABLET ORAL EVERY 4 HOURS PRN
Status: DISCONTINUED | OUTPATIENT
Start: 2022-07-16 | End: 2022-08-15

## 2022-07-16 RX ORDER — RISPERIDONE 1 MG/1
1 TABLET ORAL AT BEDTIME
Status: DISCONTINUED | OUTPATIENT
Start: 2022-07-16 | End: 2022-07-16

## 2022-07-16 RX ORDER — ALBUTEROL SULFATE 90 UG/1
1-2 AEROSOL, METERED RESPIRATORY (INHALATION) EVERY 6 HOURS PRN
Status: ON HOLD | COMMUNITY
End: 2022-08-29

## 2022-07-16 RX ORDER — DESVENLAFAXINE 50 MG/1
50 TABLET, FILM COATED, EXTENDED RELEASE ORAL DAILY
Status: ON HOLD | COMMUNITY
End: 2022-08-29

## 2022-07-16 RX ORDER — POLYETHYLENE GLYCOL 3350 17 G/17G
17 POWDER, FOR SOLUTION ORAL DAILY PRN
Status: DISCONTINUED | OUTPATIENT
Start: 2022-07-16 | End: 2022-07-16

## 2022-07-16 RX ORDER — PRAZOSIN HYDROCHLORIDE 1 MG/1
1 CAPSULE ORAL AT BEDTIME
Status: DISCONTINUED | OUTPATIENT
Start: 2022-07-16 | End: 2022-08-29 | Stop reason: HOSPADM

## 2022-07-16 RX ORDER — DEXTROAMPHETAMINE SACCHARATE, AMPHETAMINE ASPARTATE, DEXTROAMPHETAMINE SULFATE AND AMPHETAMINE SULFATE 5; 5; 5; 5 MG/1; MG/1; MG/1; MG/1
20 TABLET ORAL 3 TIMES DAILY
Status: ON HOLD | COMMUNITY
End: 2022-08-29

## 2022-07-16 RX ORDER — DIAZEPAM 2 MG
2 TABLET ORAL DAILY PRN
Status: ON HOLD | COMMUNITY
End: 2022-08-29

## 2022-07-16 RX ORDER — BENZOCAINE/MENTHOL 6 MG-10 MG
LOZENGE MUCOUS MEMBRANE 2 TIMES DAILY PRN
Status: DISCONTINUED | OUTPATIENT
Start: 2022-07-16 | End: 2022-08-29 | Stop reason: HOSPADM

## 2022-07-16 RX ORDER — OLANZAPINE 10 MG/1
10 TABLET ORAL 3 TIMES DAILY PRN
Status: DISCONTINUED | OUTPATIENT
Start: 2022-07-16 | End: 2022-07-18

## 2022-07-16 RX ADMIN — HYDROXYZINE HYDROCHLORIDE 25 MG: 25 TABLET ORAL at 17:35

## 2022-07-16 RX ADMIN — POLYETHYLENE GLYCOL 3350 17 G: 17 POWDER, FOR SOLUTION ORAL at 09:02

## 2022-07-16 RX ADMIN — ACETAMINOPHEN 325MG 650 MG: 325 TABLET ORAL at 09:02

## 2022-07-16 ASSESSMENT — ACTIVITIES OF DAILY LIVING (ADL)
ADLS_ACUITY_SCORE: 41
ADLS_ACUITY_SCORE: 45
ADLS_ACUITY_SCORE: 41
ORAL_HYGIENE: INDEPENDENT
DRESS: INDEPENDENT
DRESS: INDEPENDENT
ADLS_ACUITY_SCORE: 41
ORAL_HYGIENE: INDEPENDENT
ADLS_ACUITY_SCORE: 41
LAUNDRY: WITH SUPERVISION
HYGIENE/GROOMING: INDEPENDENT
ADLS_ACUITY_SCORE: 45
ADLS_ACUITY_SCORE: 45
ADLS_ACUITY_SCORE: 41
ADLS_ACUITY_SCORE: 45
HYGIENE/GROOMING: INDEPENDENT
ADLS_ACUITY_SCORE: 45
LAUNDRY: WITH SUPERVISION
ADLS_ACUITY_SCORE: 41
ADLS_ACUITY_SCORE: 41

## 2022-07-16 NOTE — PLAN OF CARE
Problem: Suicidal Behavior  Goal: Suicidal Behavior is Absent or Managed  Outcome: Ongoing, Progressing   Goal Outcome Evaluation:    Plan of Care Reviewed With: patient      See is a 31 year old male admitted from [facility] ED, he presented to the ED with suicidal ideation with plan to jump off the brigde or walk into traffic.  Patient has been calm and cooperative  so far this shift. Took shower. Denies all psych symptoms. He was out in the unit but reported being tired. Citing he did not sleep well last night. Declined completion of admission. He spent most of the day in the lounge watching television and napping on and off.   See reported  blood in his stool, requesting lidocaine cream. Upon assessment writer noted patient was constipated. Miralax was given for constipation and tylenol for pain. Informed him he will be seen by provider today and they will address his concerns in regards to lidocaine cream. Patient was agreeable. Providers were informed of patient request. Seen by providers. Per provider, patient reported he was taking Diazepam. They are in the process of verifying and ordering this medication if needed. Staff to monitor patient for any signs or symptoms of withdrawal. No hyper verbal or any behavior issues this shift. Continues to be pleasant and cooperative.   Per ED note patient is on 72 hour hold. His rights were  read to him on 7/15/22 at 0637 No concern so far this shift.   Staff will continue to monitor.

## 2022-07-16 NOTE — PLAN OF CARE
07/16/22 1316   Patient Belongings   Patient Belongings remains with patient;locker;sent to security per site process   Patient Belongings Remaining with Patient cash/credit card;shoes   Patient Belongings Put in Hospital Secure Location (Security or Locker, etc.) cash/credit card   Belongings Search Yes   Clothing Search Yes   Second Staff Night staff.   A             $ 20 X 6, $5 X 1, $ 2 X 1, $.25 x 18, $.10x10,one cents x 27, five cents x5, to security.. [id number]. E cig x 3 and Loon,T-shirt,shorts x2 with strings,shirt, shoes, keys ,Berry limeade liquid and socks in locker.  Admission:  I am responsible for any personal items that are not sent to the safe or pharmacy.  [facility] is not responsible for loss, theft or damage of any property in my possession.    See transferred to station 12 on the evening shift Wednesday, 7/20    Upon transfer pt's belongings were place in his locker on station 12N:  1-t shirt, 1- button down shirt, 1-pair of socks, 1-pair of black shoes with laces, 1-key, 1-lanyard, 1-bottle opener, 1-chap stick, 3-lighters, 1-bottle of Berry Limeade Liquid. 2-shorts and 1-pen.    Sent to security on 7/16/22: Cash and cents. Envelope # [id number]    Signature:  _________________________________ Date: _______  Time: _____                                              Staff Signature:  ____________________________ Date: ________  Time: _____      2nd Staff person, if patient is unable/unwilling to sign:    Signature: ________________________________ Date: ________  Time: _____     Discharge:  [facility] has returned all of my personal belongings:    Signature: _________________________________ Date: ________  Time: _____                                          Staff Signature:  ____________________________ Date: ________  Time: _____

## 2022-07-16 NOTE — ED NOTES
Pt upset because he was woken up to bring him to the unit, stating he will go in the morning. Pt was loud, angry and arguing with security officers and making racial remarks. Suddenly pt got up and aggressively moved towards security officers, deescalated. Pt talked into sitting in the wheel chair, continued to be loud. Pt escorted to 20N with extra psych associates/security officers.

## 2022-07-16 NOTE — TELEPHONE ENCOUNTER
631pm - Jimi, on call resident, reviewing, will call intake back   710pm - Jimi accepts     R: 20/Bajzer, gold team     Pt placed in queue   716pm - unit notified, unsure about a good time for report   719pm - ED charge notified via text page, asked to check in after shift change due to staffing

## 2022-07-16 NOTE — PLAN OF CARE
"Pt was visible in the milieu. Pt was pacing in the halls intermittently. Pt was anxious and restless. Pt took prn Hydroxyzine for anxiety and appeared to be helpful when reassessed. Pt was asking how long he will be in the hospital and was notified the team will discuss with him about discharge on Monday as pt is on 72 hour hold. Pt denied SI/SIB/AH/VH and other mental health symptoms. Pt said he had just made a mistake and used drugs that made him \"do some things\". Pt ate supper 100%. MSSA score 2 this evening.    Problem: Behavioral Health Plan of Care  Goal: Adheres to Safety Considerations for Self and Others  Outcome: Ongoing, Progressing  Intervention: Develop and Maintain Individualized Safety Plan  Recent Flowsheet Documentation  Taken 7/16/2022 1754 by Levi Sinclair RN  Safety Measures:    safety plan reviewed    safety rounds completed     Problem: Suicidal Behavior  Goal: Suicidal Behavior is Absent or Managed  Outcome: Ongoing, Progressing   Goal Outcome Evaluation:                      "

## 2022-07-16 NOTE — CARE PLAN
"Patient is a 31 year old male admitted from [place] ED, on arrival to the unit patient was brought in by security personnel and Psych[facility]ates, patient appeared tensed and was resisting against staff's direction, he required verbal prompts, refused to do admission assessment stating \"I am tired I want to go to sleep\", patient went straight to his room and went to sleep, no other behavioral concerns noted the rest of the shift.     Per ED nurse's report patient was calm and cooperative, but got irritable when staff woke him up to transfer to the unit.     Patient was BIB EMS to the ED when he was noted to wonder aimlessly [facility] a local [facility], he was also observed to be walking into traffic appearing paranoid, anxious, and  agitated.     Patient has a Hx intermittent and brief psychosis, bipolar 2, anxiety, depression and polysubstance use. Patient endorsed using heroine but was positive for amphetamines.       "

## 2022-07-16 NOTE — PHARMACY-ADMISSION MEDICATION HISTORY
"Admission medication history interview status for the 7/14/2022 admission is complete. See EPIC admission navigator for allergy information, pharmacy, prior to admission medications and immunization status.     Medication history interview sources:  patient, [facility] hospital discharge note 4/2022, fill hx, [place] , [facility] in [place]    Changes made to PTA medication list (reason)  Added: albuterol inhaler, diazepam, anastrozole, desvenlafaxine, testosterone injection, adderall IR  Deleted: clonazepam, testosterone gel, Adderall XR  Changed: none    Additional medication history information (including reliability of information, actions taken by pharmacist):  1. Most medications last filled 1/2022 or 4/2022. See reports last using most meds \"one to two months ago.\"  2. Was prescribed Risperdal upon discharge from [facility] in April 2022; experienced side effects and stopped the medication.  3. Typically gets adderall 60mg/day by using 20mg or 30mg tablets; most recently using 20mg tablets three times daily.   4. Per [place] :  Adderall 30mg tablets--dispensed 4/29/2022; #60/30 day supply  Adderall 20mg tablets--dispensed 4/28/2022; #21/7 day supply  Diazepam 2mg tablets--dispensed 4/28/2022; #30/30 day supply  Testosterone cypionate 200mg/ml--dispensed 4/27/2022; #8ml/56 day supply  5. Prior to April 2022, medications were filled semi-regularly at [facility] or [facility] in [place]    Medication history completed by: Nasrin Chang, PharmD, MPH, MS      Prior to Admission medications    Medication Sig Last Dose Taking? Auth Provider Long Term End Date   albuterol (PROAIR HFA/PROVENTIL HFA/VENTOLIN HFA) 108 (90 Base) MCG/ACT inhaler Inhale 1-2 puffs into the lungs every 6 hours as needed for shortness of breath / dyspnea or wheezing More than a month at Unknown time Yes Unknown, Entered By History     amphetamine-dextroamphetamine (ADDERALL) 20 MG tablet Take 20 mg by mouth 3 times daily Past Month at Unknown time Yes " Unknown, Entered By History     anastrozole (ARIMIDEX) 1 MG tablet Take 1 mg by mouth daily Past Month at Unknown time Yes Unknown, Entered By History Yes    desvenlafaxine (PRISTIQ) 50 MG 24 hr tablet Take 50 mg by mouth daily Past Month at Unknown time Yes Unknown, Entered By History Yes    diazepam (VALIUM) 2 MG tablet Take 2 mg by mouth daily as needed for anxiety Past Month at Unknown time Yes Unknown, Entered By History     prazosin (MINIPRESS) 1 MG capsule Take 1 mg by mouth At Bedtime  More than a month at Unknown time Yes Giovanna Calderón PA Yes    testosterone cypionate (DEPOTESTOSTERONE) 200 MG/ML injection Inject 200 mg into the muscle every 7 days Past Month at Unknown time Yes Unknown, Entered By History Yes
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
## 2022-07-17 LAB
ATRIAL RATE - MUSE: 73 BPM
DIASTOLIC BLOOD PRESSURE - MUSE: NORMAL MMHG
INTERPRETATION ECG - MUSE: NORMAL
P AXIS - MUSE: 36 DEGREES
PR INTERVAL - MUSE: 90 MS
QRS DURATION - MUSE: 84 MS
QT - MUSE: 438 MS
QTC - MUSE: 482 MS
R AXIS - MUSE: 36 DEGREES
SYSTOLIC BLOOD PRESSURE - MUSE: NORMAL MMHG
T AXIS - MUSE: 46 DEGREES
T PALLIDUM AB SER QL: NONREACTIVE
VENTRICULAR RATE- MUSE: 73 BPM

## 2022-07-17 PROCEDURE — 90853 GROUP PSYCHOTHERAPY: CPT

## 2022-07-17 PROCEDURE — 250N000013 HC RX MED GY IP 250 OP 250 PS 637

## 2022-07-17 PROCEDURE — 124N000002 HC R&B MH UMMC

## 2022-07-17 PROCEDURE — 250N000013 HC RX MED GY IP 250 OP 250 PS 637: Performed by: PSYCHIATRY & NEUROLOGY

## 2022-07-17 PROCEDURE — 250N000009 HC RX 250

## 2022-07-17 RX ORDER — ALBUTEROL SULFATE 90 UG/1
2 AEROSOL, METERED RESPIRATORY (INHALATION) EVERY 6 HOURS PRN
Status: DISCONTINUED | OUTPATIENT
Start: 2022-07-17 | End: 2022-08-29 | Stop reason: HOSPADM

## 2022-07-17 RX ADMIN — PRAZOSIN HYDROCHLORIDE 1 MG: 1 CAPSULE ORAL at 19:42

## 2022-07-17 RX ADMIN — LIDOCAINE: 40 CREAM TOPICAL at 08:54

## 2022-07-17 RX ADMIN — POLYETHYLENE GLYCOL 3350 17 G: 17 POWDER, FOR SOLUTION ORAL at 09:23

## 2022-07-17 ASSESSMENT — ACTIVITIES OF DAILY LIVING (ADL)
ORAL_HYGIENE: INDEPENDENT
ADLS_ACUITY_SCORE: 41
HYGIENE/GROOMING: HANDWASHING;INDEPENDENT
ADLS_ACUITY_SCORE: 41
ORAL_HYGIENE: INDEPENDENT
HYGIENE/GROOMING: HANDWASHING;SHOWER;INDEPENDENT
LAUNDRY: WITH SUPERVISION
DRESS: SCRUBS (BEHAVIORAL HEALTH);INDEPENDENT
ADLS_ACUITY_SCORE: 41
ADLS_ACUITY_SCORE: 41
DRESS: SCRUBS (BEHAVIORAL HEALTH);INDEPENDENT
ADLS_ACUITY_SCORE: 41
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
## 2022-07-17 NOTE — PLAN OF CARE
"Goal Outcome Evaluation:    Pt presents as somewhat disorganized, avoidant, and a bit argumentative. He has some difficulty understanding answers to ?'s, when explained to him. He is guarded and does not want to check in, privately-wanted to talk w nurse in the milieu. Pt states his mood is \"happy, joyous, and free.\" He asks repeatedly when the Dr will see him. Pt states he wants to go to tx, and to a sober house. He denies all MH sx. Pt refused vs and lab work this morning; could not score for etoh w/d. He denies withdrawal sx or pain. He is restless, and needed to be prompted to come to med  for his Miralax, several times. He showered this am, after bkfst, and asked for prn lido gel;also band aids for calluses on feet. Pt states \"God bless you and everyone,\" and mutters other Mandaeism references, during check in. Support/reassurance given.       [id number]) Pt has had a good shift. In and out of the milieu. Continues to say, \"God bless you,\" when staff pass by. His MSSA has been discontinued by provider.              "

## 2022-07-17 NOTE — PLAN OF CARE
Patient appeared sleeping, intermittently awake for bathroom use and repositioning, no prn administered this shift, patient is currently stable with safety checks in place.   Problem: Sleep Disturbance  Goal: Adequate Sleep/Rest  Outcome: Ongoing, Progressing   Goal Outcome Evaluation:

## 2022-07-18 LAB
B BURGDOR IGG+IGM SER QL: 0.11
CERULOPLASMIN SERPL-MCNC: 24 MG/DL (ref 20–60)
FOLATE SERPL-MCNC: 7.8 NG/ML (ref 4.6–34.8)
HIV 1+2 AB+HIV1 P24 AG SERPL QL IA: NONREACTIVE

## 2022-07-18 PROCEDURE — G0177 OPPS/PHP; TRAIN & EDUC SERV: HCPCS

## 2022-07-18 PROCEDURE — 250N000013 HC RX MED GY IP 250 OP 250 PS 637

## 2022-07-18 PROCEDURE — 36415 COLL VENOUS BLD VENIPUNCTURE: CPT | Performed by: PSYCHIATRY & NEUROLOGY

## 2022-07-18 PROCEDURE — 99232 SBSQ HOSP IP/OBS MODERATE 35: CPT | Mod: GC | Performed by: PSYCHIATRY & NEUROLOGY

## 2022-07-18 PROCEDURE — 250N000013 HC RX MED GY IP 250 OP 250 PS 637: Performed by: PSYCHIATRY & NEUROLOGY

## 2022-07-18 PROCEDURE — 82746 ASSAY OF FOLIC ACID SERUM: CPT | Performed by: PSYCHIATRY & NEUROLOGY

## 2022-07-18 PROCEDURE — 124N000002 HC R&B MH UMMC

## 2022-07-18 RX ORDER — OLANZAPINE 5 MG/1
5 TABLET ORAL 3 TIMES DAILY PRN
Status: DISCONTINUED | OUTPATIENT
Start: 2022-07-18 | End: 2022-07-18

## 2022-07-18 RX ORDER — OLANZAPINE 10 MG/2ML
10 INJECTION, POWDER, FOR SOLUTION INTRAMUSCULAR 3 TIMES DAILY PRN
Status: DISCONTINUED | OUTPATIENT
Start: 2022-07-18 | End: 2022-08-29 | Stop reason: HOSPADM

## 2022-07-18 RX ORDER — OLANZAPINE 5 MG/1
5-10 TABLET ORAL 3 TIMES DAILY PRN
Status: DISCONTINUED | OUTPATIENT
Start: 2022-07-18 | End: 2022-08-29 | Stop reason: HOSPADM

## 2022-07-18 RX ADMIN — HYDROXYZINE HYDROCHLORIDE 25 MG: 25 TABLET ORAL at 19:57

## 2022-07-18 RX ADMIN — POLYETHYLENE GLYCOL 3350 17 G: 17 POWDER, FOR SOLUTION ORAL at 08:57

## 2022-07-18 RX ADMIN — PRAZOSIN HYDROCHLORIDE 1 MG: 1 CAPSULE ORAL at 19:57

## 2022-07-18 ASSESSMENT — ACTIVITIES OF DAILY LIVING (ADL)
ADLS_ACUITY_SCORE: 28
ADLS_ACUITY_SCORE: 28
DOING_ERRANDS_INDEPENDENTLY_DIFFICULTY: NO
DIFFICULTY_EATING/SWALLOWING: NO
HEARING_DIFFICULTY_OR_DEAF: NO
CONCENTRATING,_REMEMBERING_OR_MAKING_DECISIONS_DIFFICULTY: NO
CHANGE_IN_FUNCTIONAL_STATUS_SINCE_ONSET_OF_CURRENT_ILLNESS/INJURY: NO
DIFFICULTY_COMMUNICATING: NO
ADLS_ACUITY_SCORE: 28
HYGIENE/GROOMING: INDEPENDENT
LAUNDRY: WITH SUPERVISION
ADLS_ACUITY_SCORE: 28
ADLS_ACUITY_SCORE: 41
WEAR_GLASSES_OR_BLIND: NO
TOILETING_ISSUES: NO
ADLS_ACUITY_SCORE: 28
ADLS_ACUITY_SCORE: 28
DRESS: INDEPENDENT
ORAL_HYGIENE: INDEPENDENT
ADLS_ACUITY_SCORE: 28
ADLS_ACUITY_SCORE: 28
DRESS: INDEPENDENT
HYGIENE/GROOMING: INDEPENDENT
FALL_HISTORY_WITHIN_LAST_SIX_MONTHS: NO
ORAL_HYGIENE: INDEPENDENT
DRESSING/BATHING_DIFFICULTY: NO
WALKING_OR_CLIMBING_STAIRS_DIFFICULTY: NO
ADLS_ACUITY_SCORE: 41

## 2022-07-18 NOTE — PLAN OF CARE
This writer was able to locate the patient's [facility] through [facility] - Agatha Flores [phone].  I was informed that patient is currently committed with a Hebert order.Patient was on a PD that was revoked. Patient's initial commitment/hebert order were signed 4/21/22.  Per Agatha, she was told by [facility] that patient was being extradited to [place] where there is a warrant out for patient.  Patient apparently has violated an OOP (strangulation).  She was unaware that patient was released from halfway- and is unsure if he was furloughed to go to treatment or not.  She is looking into this as if this was the case, the police will want to pick him up when stable to discharge.  If not, she will place patient on the CARE wait list.  Agatha faxed writer the  court order for commitment, Hebert order and revocation of PD.  These have been placed in patient's chart- copy sent to HIM and patient will be provided a copy.  MD informed and sent copy of Hebert medications.

## 2022-07-18 NOTE — PLAN OF CARE
Pt is visible in the milieu and attended groups that were offered. He presents with a labile affect, he was initially irritable and uncooperative but became slightly more pleasant throughout the shift. He denies mental health symptoms upon assessment. Pt has poor insight to current mental health and to his legal situation. Hygiene, sleep, and appetite are adequate. He generally seems to be restless and is hyperverbal during conversation.     Problem: Plan of Care - These are the overarching goals to be used throughout the patient stay.    Goal: Optimal Comfort and Wellbeing  Outcome: Ongoing, Progressing  Intervention: Provide Person-Centered Care  Recent Flowsheet Documentation  Taken 7/18/2022 1100 by Анна Suresh, RN  Trust Relationship/Rapport:    care explained    choices provided    emotional support provided    empathic listening provided    questions answered    questions encouraged    reassurance provided    thoughts/feelings acknowledged   Goal Outcome Evaluation:    Plan of Care Reviewed With: patient

## 2022-07-18 NOTE — PROGRESS NOTES
"   07/18/22 7082   Group Therapy Session   Time Session Began 1355   Time Session Ended 1440   Total Time patient participated (minutes) 15   Total # Attendees 3   Group Session Detail Who am I collages for creative expression, concentration, organization/planning, follow through, coping, healthy leisure, building self awareness, and an opportunity for socialization   Patient Response Detail Pt joined group late and left group early.  Pt verbalized frustration regarding hospital stay and does not believe that he should be \"locked up.\"  When telling the story of his admission to peers, pt perseverates on only one small behavior, omitting most.  Pt has limited insight to his illness and reason for admit and continued hospital stay.  Pt verbalizes belief that he will discharge in 1-2 days which is not consisitent with chart review.  Pt minimizes sx's prior to admit and is perseverative on this topic.  Pt worked briefly on his collage, about 10-12 min and left group prior to finishing.     "

## 2022-07-18 NOTE — PLAN OF CARE
[facility] Occupational Therapy Group Intervention Note     07/18/22 1412   Group Therapy Session   Group Attendance attended group session   Time Session Began 1115   Time Session Ended 1200   Total Time patient participated (minutes) 45   Total # Attendees 4   Group Type task skill;life skill   Group Topic Covered coping skills/lifestyle management   Group Session Detail clinic - coping skill exploration, creative expression within personally meaningful activities, and observation of social, cognitive, and kinesthetic performance skills   Patient Response/Contribution cooperative with task   Patient Response Detail Bright and energetic. Initiated IND 'brain games'. Demonstrated ~20min increments of attention before becoming distracted. Often expressed desire to see his child and family.      Opal Jaeger, CRISTIANO on 7/18/2022 at 2:13 PM     unable to assess immunization status...

## 2022-07-18 NOTE — PLAN OF CARE
07/17/22 2006   Group Therapy Session   Group Attendance attended group session   Time Session Began 1900   Time Session Ended 2000   Total Time patient participated (minutes) 60   Total # Attendees 7   Group Type psychotherapeutic   Group Topic Covered coping skills/lifestyle management   Group Session Detail Discussed relationships, boundaries, and traits. Watched the relationship tree by [employer] video. Discussed the video, the group members shared areas in their lives with which it resonated and the struggle of figuring out what relationships are 'roots' and which are 'leaves or branches'. Used the bullseye diagram to discuss levels of intimacy and boundaries.   Patient Response/Contribution discussed personal experience with topic;listened actively   Patient Response Detail Pt was actively involved in discussion, did exhibit fixation on religiousity and initially continually interrupted . Some re-direction and then corrective request assisted pt in engaging more appropriately with the group. After that, pt was verbally supportive of others and, while he remained religiously preoccupied, his comments were appropriate to the discussion.

## 2022-07-18 NOTE — PLAN OF CARE
Problem: Plan of Care - These are the overarching goals to be used throughout the patient stay.    Goal: Absence of Hospital-Acquired Illness or Injury  Outcome: Ongoing, Progressing  Intervention: Prevent Skin Injury  Recent Flowsheet Documentation  Taken 7/17/2022 1645 by Ede Mcnair RN  Body Position: position changed independently  Intervention: Prevent and Manage VTE (Venous Thromboembolism) Risk  Recent Flowsheet Documentation  Taken 7/17/2022 1645 by Ede Mcnair RN  Activity Management:    ambulated in gibson    ambulated in room    ambulated to bathroom  Goal: Optimal Comfort and Wellbeing  Outcome: Ongoing, Progressing     Problem: Behavioral Health Plan of Care  Goal: Optimal Comfort and Wellbeing  Outcome: Ongoing, Progressing  Goal: Adheres to Safety Considerations for Self and Others  Outcome: Ongoing, Progressing  Flowsheets (Taken 7/17/2022 1921)  Adheres to Safety Considerations for Self and Others: making progress toward outcome  Intervention: Develop and Maintain Individualized Safety Plan  Recent Flowsheet Documentation  Taken 7/17/2022 1645 by Ede Mcnair RN  Safety Measures:    environmental rounds completed    safety rounds completed     Problem: Suicidal Behavior  Goal: Suicidal Behavior is Absent or Managed  Outcome: Ongoing, Progressing  Flowsheets (Taken 7/17/2022 1921)  Mutually Determined Action Steps (Suicidal Behavior Absent/Managed): sets future-oriented goal     Problem: Plan of Care - These are the overarching goals to be used throughout the patient stay.    Goal: Plan of Care Review/Shift Note  Description: The Plan of Care Review/Shift note should be completed every shift.  The Outcome Evaluation is a brief statement about your assessment that the patient is improving, declining, or no change.  This information will be displayed automatically on your shift note.  Recent Flowsheet Documentation  Taken 7/17/2022 1645 by Ede Mcnair, RN  Plan of Care  Reviewed With: patient     Problem: Behavioral Health Plan of Care  Goal: Plan of Care Review  Recent Flowsheet Documentation  Taken 7/17/2022 1645 by Ede Mcnair, RN  Plan of Care Reviewed With: patient  Patient Agreement with Plan of Care: agrees  Goal: Absence of New-Onset Illness or Injury  Intervention: Identify and Manage Fall Risk  Recent Flowsheet Documentation  Taken 7/17/2022 1645 by Ede Mcnair, RN  Safety Measures:    environmental rounds completed    safety rounds completed   Goal Outcome Evaluation:    Plan of Care Reviewed With: patient     Patient was visible in milieu, he was able to interact with peers and staff members. He was intermittently irritable, upset at supper time because the food sent was not enough. He was guarded and avoidant with staff members, he denied anxiety or depression, no SI/HI, contracted for safety. He is looking forward to discuss discharge planning with MISTY.

## 2022-07-18 NOTE — PLAN OF CARE
07/18/22 1230   Individualization/Patient Specific Goals   Patient Personal Strengths community support;expressive of needs;resourceful   Patient Vulnerabilities adverse childhood experience(s);substance abuse/addiction   Anxieties, Fears or Concerns Want to get out of hospital ASAP   Individualized Care Needs None   Patient-Specific Goals (Include Timeframe) 1. Evaluation/stabilization of mood/thought d/o symptoms  2. Safe with self/others  3. medication mgmt per MD's  4. CD treatment in place- locked facility   5. Legal issues clarified  6. Coordination of care with outpatient providers   Interprofessional Rounds   Participants CTC;nursing;patient;psychiatrist;OT  (med students)   Team Discussion   Participants Dr. De Jesus, Dr. Nowak, Анна Suresh RN, Emiliana Colunga MA.SUZAN, Chanda Jaeger OT, Med students   Anticipated length of stay 14- 21 days   Continued Stay Criteria/Rationale Patient is manic/psychotic -is on a commitment with das order. Possible warrant/violation - needs clarification. Patient will likely be placd on CARE facility wait list.   Medical/Physical None   Plan Patient  will be seen daily by Psychiatry team.  Awaiting court papers with revocation and Das order re: meds.  Patient will be placed on CARE wait list.  Patient has possible warrent/extradition - awaiting clarification.   Rationale for change in precautions or plan No change in plan/precautions   Anticipated Discharge Disposition another healthcare facility;substance use treatment

## 2022-07-18 NOTE — PROGRESS NOTES
"    ----------------------------------------------------------------------------------------------------------  [facility]  Psychiatric Progress Note  Hospital Day #2    See Mendenhall MRN# [id number]   Age: 31 year old YOB: 1990   Date of Admission: 7/14/2022     Subjective   The patient was discussed with the treatment team and chart notes were reviewed.      Identifier: See Mendenhall is a 31 year old male previously diagnosed with Bipolar 2 disorder, substance use disorder, depressive disorder and anxiety disorder, PTSD and ADHD who presents with psychotic symptoms, SI with a plan in the context of methamphetamine and cocaine use. Patient is on hospital day #2. Assessment is that the current presentation is consistent with unspecified psychosis recurrent with a moderate or severe substance use disorder.     Sleep:  6.15 hours (07/18/22 0630)  Prescribed Medications: Taken as prescribed   PRN Psychiatric Medications: acetaminophen, albuterol, alum & mag hydroxide-simethicone, hydrocortisone, hydrOXYzine, lidocaine 4%, OLANZapine **OR** OLANZapine       Overnight Nursing Notes/Staff Report:  \"Patient was visible in milieu, he was able to interact with peers and staff members. He was intermittently irritable, upset at supper time because the food sent was not enough. He was guarded and avoidant with staff members, he denied anxiety or depression, no SI/HI, contracted for safety. He is looking forward to discuss discharge planning with SW.\"    \"Pt presents as somewhat disorganized, avoidant, and a bit argumentative. He has some difficulty understanding answers to ?'s, when explained to him. He is guarded and does not want to check in, privately-wanted to talk w nurse in the milieu. Pt states his mood is \"happy, joyous, and free.\" He asks repeatedly when the Dr will see him. Pt states he wants to go to tx, and to a sober house. He denies all MH sx. Pt refused vs and lab " "work this morning; could not score for etoh w/d. He denies withdrawal sx or pain. He is restless, and needed to be prompted to come to [facility] for his Miralax, several times. He showered this am, after bkfst, and asked for prn lido gel;also band aids for calluses on feet. Pt states \"God bless you and everyone,\" and mutters other Mandaen references, during check in. Support/reassurance given.\"    \"Pt was visible in the milieu. Pt was pacing in the halls intermittently. Pt was anxious and restless. Pt took prn Hydroxyzine for anxiety and appeared to be helpful when reassessed. Pt was asking how long he will be in the hospital and was notified the team will discuss with him about discharge on Monday as pt is on 72 hour hold. Pt denied SI/SIB/AH/VH and other mental health symptoms. Pt said he had just made a mistake and used drugs that made him \"do some things\". Pt ate supper 100%. MSSA score 2 this evening.\"    Patient interview:  Patient was interviewed in the conference room. Right after introductions, patient denied SI/SIB/HI/AH/VH and any other mental health concerns. Patient noted that he has been accepted for CD treatment at [facility]  and noted that he feels like he should not be in the hospital for mental health. Patient noted that he relapsed on 7/14 and was put into the hospital on a hold after stating he \"volunatrily walked in to the ED.\" Patient gave the care team multiple people to contact to verify his story in hopes of being released to go to the CD program at [facility] as soon as possible. Patient was guarded during the interview. The team was not able to broach the subject of the situation that led to his presentation at the ED and involvement of the police.  The call to the family members to provide some collateral information has not been successful till now (2p).     ROS   ROS was negative unless noted above.     Objective   Vitals:    (Temp  Min: 99.1  F (37.3  C)  Max: 99.1  F (37.3  C))  Resp: 18 (Resp  " "Min: 18  Max: 18)    (SpO2  Min: 99 %  Max: 99 %)    (Pulse  Min: 54  Max: 54)     Systolic (24hrs), Av , Min:121 , Max:121   Diastolic (24hrs), Av, Min:64, Max:64    Mental Status Examination:  Oriented to:  Grossly Oriented  General: Awake and Alert  Appearance:  appears stated age, Grooming is adequate, Dressed in hospital scrubs and appropriate weight  Behavior:  engaged, guarded, difficult to redirect, defensive and anxious  Eye Contact:  good  Psychomotor:  no abnormal motor symptoms appreciated; no catatonia present  Speech:  loud volume/tone, talkative and repetative  Language: Fluent in English with appropriate syntax and vocabulary.  Mood:  \"fine\"  Affect:  appropriate, anxious and animated  Thought Process:  goal directed, inconsistent, contradictory, disorganized and difficult to follow  Thought Content: No SI/HI/AH/VH; No apparent delusions  Associations:  questionable  Insight:  partial due to inconsistent storyline  Judgment:  fair   Attention Span: adequate for conversation  Concentration:  grossly intact  Recent and Remote Memory:  grossly intact although uncertain given unknown details of storyline  Fund of Knowledge: average     Allergies     Allergies   Allergen Reactions     Other Drug Allergy (See Comments)      Fake metal        Labs & Imaging     Results for orders placed or performed during the hospital encounter of 22 (from the past 24 hour(s))   Folate   Result Value Ref Range    Folic Acid 7.8 4.6 - 34.8 ng/mL       Trending Labs: none     Assessment   Diagnostic Impression:  See Mendenhall is a 31 year old gentleman with a past psychiatric diagnosis of  Bipolar 2 disorder, substance use disorder, depressive disorder and anxiety disorder, PTSD and ADHD. He was admitted from the  ER on 2022 where he was brought by the police due to concern for psychosis and SI with plan. This in the context of methamphetamine and cocaine use, unclear regarding medication " non-adherence, he has significant history of substance use and psychosocial stressors including housing instability, unemployment, legal issues, trauma and chronic mental health issues.     He was brought to the ED with SI, erratic and impulsive behaviors and psychosis (paranoid delusions and disorganized behavior/thought process) in the context of methamphetamine and cocaine use.  His last psychiatric hospitalization was in April/2022 at [facility]  for psychosis in context of substance use.  He is currently followed by PCP Erinn Maradiaga at [facility]. Current psychosocial stressors include legal issues, trauma, chronic mental health issues, family dynamics and medical issues which he has been coping with by using substances, acting out to self and acting out to others.  Patient's support system includes sister Samaria and friend Wilbert.       Substance use does appear to be playing a contributing role in the patient's presentation. Medical history does not appear to be significant, although chart review indicates history of chronic back pain, prescribed anabolic steroids that may be playing a role in presentation. In utero exposure and developmental delay need to be assessed.       Psychosocially history of trauma in childhood, long history of substance use, currently being unemployed and experiencing housing insecurity, legal issues, not being able to see his daughter represent both precipitating and perpetuating factors contributing to presentation.      The MSE is notable for some persisting paranoia, mood liability, being guarded, tangentiality and limited attention span/concentration, hyper-talkativeness difficult to interrupt speech. He denies self injurious behaviors. His reported symptoms of VH, paranoid delusions and grossly disorganized thought process in the context of methamphetamine and cocaine use are suggestive of substance-induced psychosis, although definitive diagnosis is still  in evolution and further collateral information from family/ outpatient provider is needed at this time; differential includes primary psychotic disorder exacerbated by substance use, PTSD, Bipolar Disorder, schizoaffective disorder.  Additionally, he has traits of impulssive behaviors which interfere with his ability to adhere with the treatment plan.       Optimization of medications to target these symptom clusters in addition to evaluation of adquate outpatient supports will be targets for treatment during this admission.      Given that he currently has persisting  Psychosis, is guarded about SI and plan, history of previous attempts and substance use representing heightened acute risk for self/others patient warrants inpatient psychiatric hospitalization to maintain his safety. Disposition pending clinical stabilization, medication optimization and development of an appropriate discharge plan, including CD treatment referral upon discharge.    Principal Diagnosis:     Unspecified psychosis recurrent, with moderate or severe use disorder     Stimulant use disorder     Secondary psychiatric diagnoses of concern this admission:     Polysubstance use disorder     Psychiatric course:  See Mendenhall was admitted to [facility] on a 72 hour hold. His PTA prazosin was continued.     See Mendenhall continued to meet criteria for inpatient hospitalization medication optimization, inpatient stabilization, and appropriate discharge planning.     Medical course:   See Mendenhall was physically examined by the ED prior to being transferred to the unit and was found to be medically stable and appropriate for admission.      Plan   Today's Changes:     Changed Olanzapine, 10 mg, TID PRN PO to 5-10 mg TID PRN PO.    Added double portions to nutritional order  _______________________________________________________________________  Psychiatric diagnoses and management:  Principal Diagnosis:     Unspecified psychosis  recurrent, with moderate or severe use disorder  o Olanzapine, 5-10 mg, TID     Secondary Psychiatric Diagnoses:     Stimulant use disorder     Polysubstance use disorder     Additional Planning:    Continue to monitor for and stabilize: irritable and psychosis    Patient will be treated in therapeutic milieu with appropriate individual and group therapies as described.    Scheduled Medications (summary):    polyethylene glycol  17 g Oral Daily     prazosin  1 mg Oral At Bedtime       PRN Medications (summary):  acetaminophen, albuterol, alum & mag hydroxide-simethicone, hydrocortisone, hydrOXYzine, lidocaine 4%, OLANZapine **OR** OLANZapine    Discontinued Medications (& Rationale):    Consults:    none    Legal Status:    72-h Hold signed on 7/14     SIO:    No, has not required SIO prior    Pt has not required locked seclusion or restraints in the past 24 hours to maintain safety, please refer to RN documentation for further details.    Disposition:    TBD, pending clinical stabilization, medication optimization, and formulation of a safe discharge plan.    Safety Assessment:   Behavioral Orders   Procedures     Assault precautions     Code 1 - Restrict to Unit     Routine Programming     As clinically indicated     Status 15     Every 15 minutes.      ____________________________________________________________________  Pertinent Medical diagnoses and management:    PTSD  o Prazosin, 1 mg, at BEDTIME  ____________________________________________________________________  This patient was seen and discussed with my resident, Dr. Nowak, and attending physician Dr. De Jesus.     Pedro Hernandez, MS3  [employer]     Attestation:   I was present with the medical student who participated in the service and in the documentation of the note.  I have verified the history and personally performed the physical exam and medical decision making. I agree with the assessment and plan of care as documented in  the note.     El Nowak MD, PGY2,  Psychiatry Resident    Attestation:  This patient has been seen and evaluated by me, Galina De Jesus MD.  I have discussed this patient with the house staff team including the resident and medical student and I agree with the findings and plan in this note.    I have reviewed today's vital signs, medications, labs and imaging. Galina De Jesus MD , PhD.

## 2022-07-18 NOTE — PLAN OF CARE
[facility] Occupational Therapy Group Intervention Note     07/18/22 1408   Group Therapy Session   Group Attendance attended group session   Time Session Began 1015   Time Session Ended 1100   Total Time patient participated (minutes) 40   Total # Attendees 5   Group Topic Covered coping skills/lifestyle management;structured socialization   Group Session Detail Pt participated in a group game to facilitate peer socialization, coping through movement, and openness within group discussion.    Patient Response/Contribution cooperative with task;discussed personal experience with topic;offered helpful suggestions to peers;listened actively   Patient Response Detail Congruent-bright affect. Hyperverbal with mildly pressured and tangential speech. Demonstrated openness with peers as he discussed motivation towards recovery / MH. Independently engaged in guided movement practices.      Opal Jaeger OT on 7/18/2022 at 2:09 PM

## 2022-07-19 LAB
ALBUMIN UR-MCNC: NEGATIVE MG/DL
APPEARANCE UR: CLEAR
BILIRUB UR QL STRIP: NEGATIVE
COLOR UR AUTO: NORMAL
GLUCOSE UR STRIP-MCNC: NEGATIVE MG/DL
HGB UR QL STRIP: NEGATIVE
KETONES UR STRIP-MCNC: NEGATIVE MG/DL
LEUKOCYTE ESTERASE UR QL STRIP: NEGATIVE
NITRATE UR QL: NEGATIVE
PH UR STRIP: 6.5 [PH] (ref 5–7)
SP GR UR STRIP: 1.02 (ref 1–1.03)
UROBILINOGEN UR STRIP-MCNC: NORMAL MG/DL

## 2022-07-19 PROCEDURE — 250N000013 HC RX MED GY IP 250 OP 250 PS 637

## 2022-07-19 PROCEDURE — 250N000013 HC RX MED GY IP 250 OP 250 PS 637: Performed by: PSYCHIATRY & NEUROLOGY

## 2022-07-19 PROCEDURE — 124N000002 HC R&B MH UMMC

## 2022-07-19 PROCEDURE — 81003 URINALYSIS AUTO W/O SCOPE: CPT | Performed by: PSYCHIATRY & NEUROLOGY

## 2022-07-19 PROCEDURE — 99232 SBSQ HOSP IP/OBS MODERATE 35: CPT | Mod: GC | Performed by: PSYCHIATRY & NEUROLOGY

## 2022-07-19 RX ORDER — QUETIAPINE FUMARATE 50 MG/1
50 TABLET, FILM COATED ORAL AT BEDTIME
Status: DISCONTINUED | OUTPATIENT
Start: 2022-07-19 | End: 2022-07-20

## 2022-07-19 RX ORDER — QUETIAPINE FUMARATE 25 MG/1
25 TABLET, FILM COATED ORAL 3 TIMES DAILY PRN
Status: DISCONTINUED | OUTPATIENT
Start: 2022-07-19 | End: 2022-07-21

## 2022-07-19 RX ADMIN — QUETIAPINE FUMARATE 25 MG: 25 TABLET ORAL at 13:22

## 2022-07-19 RX ADMIN — QUETIAPINE FUMARATE 50 MG: 50 TABLET ORAL at 20:37

## 2022-07-19 RX ADMIN — POLYETHYLENE GLYCOL 3350 17 G: 17 POWDER, FOR SOLUTION ORAL at 08:29

## 2022-07-19 RX ADMIN — PRAZOSIN HYDROCHLORIDE 1 MG: 1 CAPSULE ORAL at 20:36

## 2022-07-19 ASSESSMENT — ACTIVITIES OF DAILY LIVING (ADL)
ORAL_HYGIENE: INDEPENDENT
ADLS_ACUITY_SCORE: 28
HYGIENE/GROOMING: INDEPENDENT
HYGIENE/GROOMING: INDEPENDENT
ADLS_ACUITY_SCORE: 28
DRESS: INDEPENDENT
ADLS_ACUITY_SCORE: 28
ORAL_HYGIENE: INDEPENDENT
ADLS_ACUITY_SCORE: 28
DRESS: INDEPENDENT
ADLS_ACUITY_SCORE: 28
LAUNDRY: WITH SUPERVISION
LAUNDRY: WITH SUPERVISION

## 2022-07-19 NOTE — PLAN OF CARE
Pt has been visible in the milieu, he attends some of the groups that are offered and is social with peers. He presents as irritable and has pressured speech during conversation. Pt continues to have poor insight to situation. He endorses anxiety rated 9/10, denies all other mental health symptoms. Pt continues to request ativan and valium despite having been told the previous evening that this was not recommended by the treatment team. Pt was eventually cooperative with VS this morning with the exception of BP.    Problem: Plan of Care - These are the overarching goals to be used throughout the patient stay.    Goal: Optimal Comfort and Wellbeing  Outcome: Ongoing, Not Progressing  Intervention: Provide Person-Centered Care  Recent Flowsheet Documentation  Taken 7/19/2022 1100 by Анна Suresh RN     Goal Outcome Evaluation:    Plan of Care Reviewed With: patient

## 2022-07-19 NOTE — PROGRESS NOTES
07/19/22 1421   Group Therapy Session   Group Attendance attended group session   Time Session Began 1015   Time Session Ended 1100   Total Time patient participated (minutes) 15   Total # Attendees 5   Group Topic Covered emotions/expression;self-care activities;coping skills/lifestyle management   Group Session Detail Gentle stretching and social questions for large motor movement, sharing thoughts/feelings, coping, follow through, self-care/healthy leisure, expanding social skills, reality based activity, self awareness, building self esteem and encouraging a healthy daily routine.   Patient Response Detail Pt was in and out of group many times.  Pt continues with pressured speech, and very poor insight.  Pt is perseverative on wanting to leave and minimizes sx's and reason for admission.  Pt participated in a very limtied amount of this group.  Pt can be a bit disruptive as he frequently interrupts others and needs cues for turn taking.

## 2022-07-19 NOTE — PLAN OF CARE
Assessment/Intervention/Current Symtoms and Care Coordination  The patient's care was discussed with the treatment team and chart notes were reviewed.   Patient met with team.  Patient continues to focus on getting discharged to treatment - today perseverating on [place].  Patient lacks insight into his situation- and MI.   CTC awaiting legal clarification.  Cty CM to place patient on CARE wait list due to his repeatedly taking off from treatment facilities, relapsing, non compliance with meds.  Patient informed of his PD revocation and Das order. He is agreeable to take meds.     Discharge Plan or Goal  CARE facility      Barriers to Discharge   Acute maría  Legal (criminal) status is unclear- Cty CM attempting to clarify this with Karyn Browne.  Placement to CARE facility    Referral Status  Patient's Cty CM is placing patient on the CARE wait list    Legal Status     Patient is civilly committed with a Das order through [facility]

## 2022-07-19 NOTE — PLAN OF CARE
Problem: Plan of Care - These are the overarching goals to be used throughout the patient stay.    Goal: Absence of Hospital-Acquired Illness or Injury  Outcome: Ongoing, Progressing  Intervention: Prevent Skin Injury  Recent Flowsheet Documentation  Taken 7/18/2022 1653 by Ede Mcnair RN  Body Position: position changed independently  Intervention: Prevent and Manage VTE (Venous Thromboembolism) Risk  Recent Flowsheet Documentation  Taken 7/18/2022 1653 by Ede Mcnair RN  Activity Management:   ambulated in [place]son   ambulated in room   ambulated to bathroom  Goal: Optimal Comfort and Wellbeing  Outcome: Ongoing, Progressing  Intervention: Provide Person-Centered Care  Recent Flowsheet Documentation  Taken 7/18/2022 1653 by Ede Mcnair RN  Trust Relationship/Rapport:   care explained   choices provided   emotional support provided   empathic listening provided   questions answered   questions encouraged   reassurance provided   thoughts/feelings acknowledged     Problem: Behavioral Health Plan of Care  Goal: Optimal Comfort and Wellbeing  Outcome: Ongoing, Progressing  Intervention: Provide Person-Centered Care  Recent Flowsheet Documentation  Taken 7/18/2022 1653 by Ede Mcnair RN  Trust Relationship/Rapport:   care explained   choices provided   emotional support provided   empathic listening provided   questions answered   questions encouraged   reassurance provided   thoughts/feelings acknowledged  Goal: Adheres to Safety Considerations for Self and Others  Outcome: Ongoing, Progressing  Flowsheets (Taken 7/18/2022 2206)  Adheres to Safety Considerations for Self and Others: making progress toward outcome  Intervention: Develop and Maintain Individualized Safety Plan  Recent Flowsheet Documentation  Taken 7/18/2022 1653 by Ede Mcnair, RN  Safety Measures:   environmental rounds completed   safety rounds completed     Problem: Suicidal Behavior  Goal: Suicidal Behavior is  Absent or Managed  Outcome: Ongoing, Progressing  Flowsheets (Taken 7/18/2022 2206)  Mutually Determined Action Steps (Suicidal Behavior Absent/Managed): sets future-oriented goal     Problem: Plan of Care - These are the overarching goals to be used throughout the patient stay.    Goal: Plan of Care Review/Shift Note  Description: The Plan of Care Review/Shift note should be completed every shift.  The Outcome Evaluation is a brief statement about your assessment that the patient is improving, declining, or no change.  This information will be displayed automatically on your shift note.  Recent Flowsheet Documentation  Taken 7/18/2022 1653 by Eed Mcnair, RN  Plan of Care Reviewed With: patient     Problem: Behavioral Health Plan of Care  Goal: Plan of Care Review  Recent Flowsheet Documentation  Taken 7/18/2022 1653 by Ede Mcnair RN  Plan of Care Reviewed With: patient  Patient Agreement with Plan of Care: agrees  Goal: Absence of New-Onset Illness or Injury  Intervention: Identify and Manage Fall Risk  Recent Flowsheet Documentation  Taken 7/18/2022 1653 by Ede Mcnair, RN  Safety Measures:   environmental rounds completed   safety rounds completed  Goal: Optimized Coping Skills in Response to Life Stressors  Flowsheets (Taken 7/18/2022 2206)  Optimized Coping Skills in Response to Life Stressors: making progress toward outcome  Goal: Develops/Participates in Therapeutic [place] to Support Successful Transition  Intervention: Foster Therapeutic [place]  Recent Flowsheet Documentation  Taken 7/18/2022 1653 by Ede Mcnair, RN  Trust Relationship/Rapport:   care explained   choices provided   emotional support provided   empathic listening provided   questions answered   questions encouraged   reassurance provided   thoughts/feelings acknowledged   Goal Outcome Evaluation:    Plan of Care Reviewed With: patient     Patient was mostly in milieu, able to watch tv and interact with  "peers. He presented as guarded, avoidant towards staff members, he appeared anxious and restless though denied anxiety during assessment but requested for Valium ordered to help with anxiety (On call recommended to encourage patient to address issue with team in AM). Received Hydroxyzine PRN at HS \"My anxiety is through the roof\" he denied depression or other symptoms. Denied pain or discomfort. Continues to refused BP check prior to Minipress administration (Parameter/note added to order).      "

## 2022-07-19 NOTE — PROGRESS NOTES
07/19/22 1502   Group Therapy Session   Group Attendance attended group session   Time Session Began 1315   Time Session Ended 1400   Total Time patient participated (minutes) 15   Total # Attendees 3   Group Type task skill   Group Topic Covered leisure exploration/use of leisure time;coping skills/lifestyle management;structured socialization;cognitive activities   Group Session Detail Tapple category game for cognitive processing, concentration, follow through, coping, reality based activity, mood stabiliztion, healthy leisure, socialization and an opportunity to experience success   Patient Response Detail Pt joined for the final 1/3 of the group session only.  Pt did appear to benefit from focusing on another topic aside from his desire to leave.  Pt did struggle to generate responses for the activity as the level of difficulty nghia.

## 2022-07-19 NOTE — PLAN OF CARE
Pt appeared to sleep for a total of 6.75 hours. No PRN's were given and no concerns were noted.  Problem: Sleep Disturbance  Goal: Adequate Sleep/Rest  Outcome: Ongoing, Progressing

## 2022-07-19 NOTE — PLAN OF CARE
"  The patient talked about the circumstances that led up to him going to senior care. Said he called the police for a welfare check and the  found him walking down the road. He voiced his frustration about the incident and did not give many details. He is very focused on discharging. Said he was court ordered to treatment and that he feels he can benefit from it although this is not how he had planned to spend his summer also said, \"I have an 8 year old.\" He asked how much longer he will be here in the hospital before getting discharged to CD treatment. He was told to talk with his CTC about discharge plans. The patient is restless and distractible and seems slightly irritable although denies any irritability.  He denied pain, anxiety and depression. Said no SI or hallucinations. He was observed social with select peers. He appeared annoyed when asked assessment questions regarding his mood. He has a good appetite, requesting snacks and ate 100% of dinner. Was compliant with scheduled medications.  Urine sample sent to lab, results pending.    Problem: Behavioral Health Plan of Care  Goal: Optimal Comfort and Wellbeing  Outcome: Ongoing, Progressing     Problem: Plan of Care - These are the overarching goals to be used throughout the patient stay.    Goal: Optimal Comfort and Wellbeing  Outcome: Ongoing, Progressing   Goal Outcome Evaluation:         "

## 2022-07-19 NOTE — PROGRESS NOTES
"    ----------------------------------------------------------------------------------------------------------  [facility]  Psychiatric Progress Note  Hospital Day #3    See Mendenhall MRN# [id number]   Age: 31 year old YOB: 1990   Date of Admission: 7/14/2022     Subjective   The patient was discussed with the treatment team and chart notes were reviewed.      Identifier: See Mendenhall is a 31 year old male previously diagnosed with Bipolar 2 disorder, substance use disorder, depressive disorder and anxiety disorder, PTSD and ADHD who presents with psychotic symptoms, SI with a plan in the context of methamphetamine and cocaine use. Patient is on hospital day #3. Assessment is that the current presentation is consistent with unspecified psychosis recurrent with a moderate or severe substance use disorder.     Sleep:  6.75 hours (07/19/22 0600)  Prescribed Medications: Taken as prescribed   PRN Psychiatric Medications: acetaminophen, albuterol, alum & mag hydroxide-simethicone, hydrocortisone, hydrOXYzine, lidocaine 4%, nicotine, OLANZapine **OR** OLANZapine       Overnight Nursing Notes/Staff Report:  \"Pt appeared to sleep for a total of 6.75 hours. No PRN's were given and no concerns were noted.\"    \"Patient was mostly in milieu, able to watch tv and interact with peers. He presented as guarded, avoidant towards staff members, he appeared anxious and restless though denied anxiety during assessment but requested for Valium ordered to help with anxiety (On call recommended to encourage patient to address issue with team in AM). Received Hydroxyzine PRN at HS \"My anxiety is through the roof\" he denied depression or other symptoms. Denied pain or discomfort. Continues to refused BP check prior to Minipress administration.\"    \"Pt is visible in the milieu and attended groups that were offered. He presents with a labile affect, he was initially irritable and uncooperative " "but became slightly more pleasant throughout the shift. He denies mental health symptoms upon assessment. Pt has poor insight to current mental health and to his legal situation. Hygiene, sleep, and appetite are adequate. He generally seems to be restless and is hyperverbal during conversation.\"    Patient interview:  Patient was interviewed in the conference room. Patient noted that he is motivated to take charge of his plan and work with the care team to do what is best for him. Patient noted a numerous amount of options for placement after hospitalization. The care team noted that the situation will depend on the 's recommendation. Patient noted he had called his case manger this morning and left a message to communicate his wishes. Patient stated \"I have the tenacity to be successful in treatment.\" The team noted that he is on court order GALLAGHER to take medications and the patient expressed desire that he would like to take Seroquil at night. The care team reminded the patients that he is on commitment and the patient requested that the team work with him on getting out of here. The team discussed treatment options and patient agreed to start Seroquil 50 mg BEDTIME.     Patient was cooperative and goal-oriented during the interview but demonstrated  flight of ideas, tangential thoughts, hyperverbal and possible hypomanic behavior.      ROS   ROS was negative unless noted above.     Objective   Vitals:  Temp: 98  F (36.7  C) (Temp  Min: 98  F (36.7  C)  Max: 99.1  F (37.3  C))  Resp: 16 (Resp  Min: 16  Max: 16)  SpO2: 99 % (SpO2  Min: 99 %  Max: 99 %)  Pulse: 69 (Pulse  Min: 54  Max: 69)  BP: 131/75  Systolic (24hrs), Av , Min:121 , Max:131   Diastolic (24hrs), Av, Min:64, Max:75    Mental Status Examination:  Oriented to:  Grossly Oriented  General: Awake and Alert  Appearance:  appears stated age, Grooming is adequate, Dressed in hospital scrubs and appropriate weight  Behavior:  " "cooperative, engaged, difficult to redirect and hypomanic  Eye Contact:  good  Psychomotor:  no abnormal motor symptoms appreciated; no catatonia present  Speech:  loud volume/tone and hyperverbal  Language: Fluent in English with appropriate syntax and vocabulary.  Mood:  \"motivated\"  Affect:  appropriate, congruent with mood, broad/full, anxious and animated  Thought Process:  goal directed, disorganized, difficult to follow and tangential  Thought Content: No SI/HI/AH/VH; No apparent delusions  Associations:  questionable  Insight:  partial due to inconsistent storyline  Judgment:  fair   Attention Span: adequate for conversation  Concentration:  grossly intact  Recent and Remote Memory:  grossly intact   Fund of Knowledge: average     Allergies     Allergies   Allergen Reactions     Other Drug Allergy (See Comments)      Fake metal        Labs & Imaging     No results found for this or any previous visit (from the past 24 hour(s)).    Trending Labs: none     Assessment   Diagnostic Impression:  See Mendenhall is a 31 year old gentleman with a past psychiatric diagnosis of  Bipolar 2 disorder, substance use disorder, depressive disorder and anxiety disorder, PTSD and ADHD. He was admitted from the  [facility] on 07/16/2022 where he was brought by the police due to concern for psychosis and SI with plan. This in the context of methamphetamine and cocaine use, unclear regarding medication non-adherence, he has significant history of substance use and psychosocial stressors including housing instability, unemployment, legal issues, trauma and chronic mental health issues.     He was brought to the ED with SI, erratic and impulsive behaviors and psychosis (paranoid delusions and disorganized behavior/thought process) in the context of methamphetamine and cocaine use.  His last psychiatric hospitalization was in April/2022 at [facility]  for psychosis in context of substance use.  He is currently followed by PCP Hiren, " Erinn CARVAJAL at [facility]. Current psychosocial stressors include legal issues, trauma, chronic mental health issues, family dynamics and medical issues which he has been coping with by using substances, acting out to self and acting out to others.  Patient's support system includes sister Samaria and friend Wilbert.       Substance use does appear to be playing a contributing role in the patient's presentation. Medical history does not appear to be significant, although chart review indicates history of chronic back pain, prescribed anabolic steroids that may be playing a role in presentation. In utero exposure and developmental delay need to be assessed.       Psychosocially history of trauma in childhood, long history of substance use, currently being unemployed and experiencing housing insecurity, legal issues, not being able to see his daughter represent both precipitating and perpetuating factors contributing to presentation.      The MSE is notable for some persisting paranoia, mood liability, being guarded, tangentiality and limited attention span/concentration, hyper-talkativeness difficult to interrupt speech. He denies self injurious behaviors. His reported symptoms of VH, paranoid delusions and grossly disorganized thought process in the context of methamphetamine and cocaine use are suggestive of substance-induced psychosis, although definitive diagnosis is still in evolution and further collateral information from family/ outpatient provider is needed at this time; differential includes primary psychotic disorder exacerbated by substance use, PTSD, Bipolar Disorder, schizoaffective disorder.  Additionally, he has traits of impulssive behaviors which interfere with his ability to adhere with the treatment plan.       Optimization of medications to target these symptom clusters in addition to evaluation of adquate outpatient supports will be targets for treatment during this admission.       Given that he currently has persisting  Psychosis, is guarded about SI and plan, history of previous attempts and substance use representing heightened acute risk for self/others patient warrants inpatient psychiatric hospitalization to maintain his safety. Disposition pending clinical stabilization, medication optimization and development of an appropriate discharge plan, including CD treatment referral upon discharge.    Principal Diagnosis:     Unspecified psychosis recurrent, with moderate or severe use disorder     Stimulant use disorder     Secondary psychiatric diagnoses of concern this admission:     Polysubstance use disorder     Psychiatric course:  See Mendenhall was admitted to [facility] on a 72 hour hold. His PTA prazosin was continued.     See Mendenhall continued to meet criteria for inpatient hospitalization medication optimization, inpatient stabilization, and appropriate discharge planning.     (7/19/2022) - added Seroquel 50 mg BEDTIME; 25 mg TID PRN    Medical course:   See Mendenhall was physically examined by the ED prior to being transferred to the unit and was found to be medically stable and appropriate for admission.      Plan   Today's Changes:     added Seroquel 50 mg BEDTIME; 25 mg TID PRN  _______________________________________________________________________  Psychiatric diagnoses and management:  Principal Diagnosis:     Unspecified psychosis recurrent, with moderate or severe use disorder  o Olanzapine, 5-10 mg, TID     Secondary Psychiatric Diagnoses:     Stimulant use disorder     Polysubstance use disorder     Additional Planning:    Continue to monitor for and stabilize: irritable and psychosis    Patient will be treated in therapeutic milieu with appropriate individual and group therapies as described.    Scheduled Medications (summary):    polyethylene glycol  17 g Oral Daily     prazosin  1 mg Oral At Bedtime       PRN Medications (summary):  acetaminophen,  albuterol, alum & mag hydroxide-simethicone, hydrocortisone, hydrOXYzine, lidocaine 4%, nicotine, OLANZapine **OR** OLANZapine, QUEtiapine    Discontinued Medications (& Rationale):    Consults:    none    Legal Status:    72-h Hold signed on 7/14     SIO:    No, has not required SIO prior    Pt has not required locked seclusion or restraints in the past 24 hours to maintain safety, please refer to RN documentation for further details.    Disposition:    TBD, pending clinical stabilization, medication optimization, and formulation of a safe discharge plan.    Safety Assessment:   Behavioral Orders   Procedures     Assault precautions     Code 1 - Restrict to Unit     Routine Programming     As clinically indicated     Status 15     Every 15 minutes.      ____________________________________________________________________  Pertinent Medical diagnoses and management:    PTSD  o Prazosin, 1 mg, at BEDTIME  ____________________________________________________________________  This patient was seen and discussed with my resident, Dr. Nowak, and attending physician Dr. De Jesus.     Pedro Hernandez, MS3  [employer]     Attestation:   I was present with the medical student who participated in the service and in the documentation of the note.  I have verified the history and personally performed the physical exam and medical decision making. I agree with the assessment and plan of care as documented in the note.     El Nowak MD, PGY2,  Psychiatry Resident    Attestation:  This patient has been seen and evaluated by me, Galina De Jesus MD.  I have discussed this patient with the house staff team including the resident and medical student and I agree with the findings and plan in this note.    I have reviewed today's vital signs, medications, labs and imaging. Galina De Jesus MD , PhD.

## 2022-07-20 ENCOUNTER — TELEPHONE (OUTPATIENT)
Dept: BEHAVIORAL HEALTH | Facility: CLINIC | Age: 32
End: 2022-07-20

## 2022-07-20 PROCEDURE — 250N000013 HC RX MED GY IP 250 OP 250 PS 637

## 2022-07-20 PROCEDURE — 250N000013 HC RX MED GY IP 250 OP 250 PS 637: Performed by: PSYCHIATRY & NEUROLOGY

## 2022-07-20 PROCEDURE — 99232 SBSQ HOSP IP/OBS MODERATE 35: CPT | Mod: GC | Performed by: PSYCHIATRY & NEUROLOGY

## 2022-07-20 PROCEDURE — G0177 OPPS/PHP; TRAIN & EDUC SERV: HCPCS

## 2022-07-20 PROCEDURE — 124N000002 HC R&B MH UMMC

## 2022-07-20 RX ORDER — QUETIAPINE FUMARATE 100 MG/1
100 TABLET, FILM COATED ORAL AT BEDTIME
Status: DISCONTINUED | OUTPATIENT
Start: 2022-07-20 | End: 2022-07-28 | Stop reason: ALTCHOICE

## 2022-07-20 RX ADMIN — PRAZOSIN HYDROCHLORIDE 1 MG: 1 CAPSULE ORAL at 19:18

## 2022-07-20 RX ADMIN — QUETIAPINE FUMARATE 25 MG: 25 TABLET ORAL at 23:35

## 2022-07-20 RX ADMIN — POLYETHYLENE GLYCOL 3350 17 G: 17 POWDER, FOR SOLUTION ORAL at 09:06

## 2022-07-20 RX ADMIN — NICOTINE POLACRILEX 4 MG: 4 GUM, CHEWING BUCCAL at 19:42

## 2022-07-20 RX ADMIN — QUETIAPINE FUMARATE 100 MG: 100 TABLET ORAL at 21:16

## 2022-07-20 RX ADMIN — HYDROXYZINE HYDROCHLORIDE 25 MG: 25 TABLET ORAL at 22:35

## 2022-07-20 ASSESSMENT — ACTIVITIES OF DAILY LIVING (ADL)
ADLS_ACUITY_SCORE: 28
HYGIENE/GROOMING: INDEPENDENT
ORAL_HYGIENE: INDEPENDENT
ADLS_ACUITY_SCORE: 28
LAUNDRY: WITH SUPERVISION
ADLS_ACUITY_SCORE: 28
LAUNDRY: UNABLE TO COMPLETE
ADLS_ACUITY_SCORE: 28
DRESS: INDEPENDENT
HYGIENE/GROOMING: INDEPENDENT
ADLS_ACUITY_SCORE: 28
ADLS_ACUITY_SCORE: 28
DRESS: INDEPENDENT
ADLS_ACUITY_SCORE: 28
ORAL_HYGIENE: INDEPENDENT

## 2022-07-20 NOTE — TELEPHONE ENCOUNTER
R: Patient cleared and ready for behavioral bed placement: Yes    9:50am Intake received a call from Dr. De Jesus. Provider is requesting this pt immediately transfer to another unit due to increasing conflict between two pt's on the unit.     10:18am Dr. De Jesus and Dr. Ferguson were contacted for doc-to-doc. Intake will wait for a call back.     1;05pm intake received report that this pt will transfer to st 12.     1:13pm Intake called st 12 and provided follow-up information on pt transfer.     1:14pm Intake called st 20 and also provided follow up information.

## 2022-07-20 NOTE — PROGRESS NOTES
Pt returned to station [facility] via ECT.  Pt's VSS, denies nausea, alert and oriented.  Pt ate some breakfast, took her am medications, and  then went back to bed
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
## 2022-07-20 NOTE — PLAN OF CARE
"Patient visible in milieu area watching TV, listening to music and pacing sometimes in the hallway. Presents with labile, inappropriate affect, tense mood. Refused to check in with writer, saying that, \"I don't want to talk to you.\" Noted targeting other patients on the floor and was redirected couple times. Making racial comments towards other patient and was redirected and encouraged not to use those words in the unit. Updated the provider, waiting to transfer to a different floor for others safety. Patient approached writer and said that, \"I know is my fault and is my behavior that is causing all these to happen to me\". Writer acknowledged and patient was apologetic to what happened in the morning with other patient.   Plan: Ensure safety and keep redirecting patient.    Goal Outcome Evaluation:   Problem: Behavioral Health Plan of Care  Goal: Adheres to Safety Considerations for Self and Others  Intervention: Develop and Maintain Individualized Safety Plan  Recent Flowsheet Documentation  Taken 7/20/2022 1000 by Fredy Escobar RN  Safety Measures:    environmental rounds completed    safety rounds completed       Plan of Care Reviewed With: patient                 "

## 2022-07-20 NOTE — PLAN OF CARE
[facility] Occupational Therapy Group Intervention Note     07/20/22 1219   Group Therapy Session   Group Attendance attended group session   Time Session Began 1110   Time Session Ended 1200   Total Time patient participated (minutes) 40   Total # Attendees 4   Group Type task skill;life skill   Group Topic Covered coping skills/lifestyle management   Group Session Detail clinic - coping skill exploration, creative expression within personally meaningful activities, and observation of social, cognitive, and kinesthetic performance skills   Patient Response/Contribution cooperative with task;expressed readiness to alter behaviors   Patient Response Detail Congruent affect. Highly social - hyperverbal - with peers discussing plans for transfer and discharge. Engaged in change talk, yet, somewhat minimizes extent of MI / CD.      Opal Jaeger OT on 7/20/2022 at 12:20 PM

## 2022-07-20 NOTE — PLAN OF CARE
Problem: Sleep Disturbance  Goal: Adequate Sleep/Rest  Outcome: Ongoing, Progressing          Pt appeared to have slept for 6.75 hours. No complain of pain and no prn medications were administered. 15 minutes safety checks was in place. No behavior or safety concerns noted. Staff will continue to offer support to pt.
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
## 2022-07-20 NOTE — PLAN OF CARE
[facility] Occupational Therapy Brief Assessment Note     07/20/22 1436   Clinical Impression   Affect Excessive, manic   Orientation Oriented to person, place and time   Appearance and ADLs Neatly groomed   Attention to Internal Stimuli No observed signs   Interaction Skills Intrusive;Interacts appropriately with staff;Interacts appropriately with peers  (requires min redirection for appropriate verbal boundaries)   Ability to Communicate Needs Independent   Verbal Content Clear;Appropriate to topic;Tangential;Hyperverbal;Perservates on topic   Ability to Maintain Boundaries Accepts and maintains boundaries with one cue;Maintains appropriate physical boundaries   Participation Initiates participation;Independently participates   Concentration Concentrates 10-20 minutes   Ability to Concentrate With structure;With refocus;Easily distracted   Follows and Comprehends Directions Independently follows multi-step directions   Memory Delayed and immediate recall intact   Organization Independently organizes all tasks   Decision Making Independent   Planning and Problem Solving Independently plans ahead   Ability to Apply and Learn Concepts Applies within group structure   Frustrations / Stress Tolerance Independently identifies sources of frustration/stress;Independently identifies skills    Level of Insight Some insight   Self Esteem Can identify positives   Social Supports Has knowledge of support systems     Opal Jaeger OT on 7/20/2022 at 2:38 PM

## 2022-07-20 NOTE — PLAN OF CARE
[facility] Occupational Therapy Group Intervention Note     07/20/22 1422   Group Therapy Session   Group Attendance attended group session   Time Session Began 1310   Time Session Ended 1400   Total Time patient participated (minutes) 45   Total # Attendees 4   Group Type psychoeducation   Group Topic Covered coping skills/lifestyle management   Group Session Detail Mental health management group focusing on coping skill exploration. Education provided on maladaptive versus adaptive response to stress /symptoms and use within routine.   Patient Response/Contribution cooperative with task;discussed personal experience with topic;listened actively;expressed understanding of topic   Patient Response Detail Pt Response: Identified several coping skills utilized to reduce stress and manage symptoms, yet, also reported several barriers to success. Accepted additional suggestions; receptive to discussion r/t taking accountability throughout the recovery process.      Opal Jaeger OT on 7/20/2022 at 2:22 PM

## 2022-07-20 NOTE — PROGRESS NOTES
Pt transferred to station 12 at this time. Pt is calm and cooperative. Pt's belongings sent to station 12.. Receiving RN updated.

## 2022-07-21 PROCEDURE — 250N000013 HC RX MED GY IP 250 OP 250 PS 637: Performed by: PSYCHIATRY & NEUROLOGY

## 2022-07-21 PROCEDURE — H2032 ACTIVITY THERAPY, PER 15 MIN: HCPCS

## 2022-07-21 PROCEDURE — 124N000002 HC R&B MH UMMC

## 2022-07-21 PROCEDURE — 99233 SBSQ HOSP IP/OBS HIGH 50: CPT | Performed by: PSYCHIATRY & NEUROLOGY

## 2022-07-21 PROCEDURE — 250N000013 HC RX MED GY IP 250 OP 250 PS 637

## 2022-07-21 RX ORDER — QUETIAPINE FUMARATE 25 MG/1
25-50 TABLET, FILM COATED ORAL 3 TIMES DAILY PRN
Status: DISCONTINUED | OUTPATIENT
Start: 2022-07-21 | End: 2022-08-25 | Stop reason: DRUGHIGH

## 2022-07-21 RX ORDER — QUETIAPINE FUMARATE 100 MG/1
100 TABLET, FILM COATED ORAL
Status: DISCONTINUED | OUTPATIENT
Start: 2022-07-21 | End: 2022-08-17

## 2022-07-21 RX ADMIN — OLANZAPINE 10 MG: 5 TABLET, FILM COATED ORAL at 00:58

## 2022-07-21 RX ADMIN — ACETAMINOPHEN 325MG 650 MG: 325 TABLET ORAL at 01:19

## 2022-07-21 RX ADMIN — HYDROXYZINE HYDROCHLORIDE 25 MG: 25 TABLET ORAL at 20:52

## 2022-07-21 RX ADMIN — QUETIAPINE FUMARATE 100 MG: 100 TABLET ORAL at 22:16

## 2022-07-21 RX ADMIN — PRAZOSIN HYDROCHLORIDE 1 MG: 1 CAPSULE ORAL at 20:14

## 2022-07-21 RX ADMIN — POLYETHYLENE GLYCOL 3350 17 G: 17 POWDER, FOR SOLUTION ORAL at 10:10

## 2022-07-21 ASSESSMENT — ACTIVITIES OF DAILY LIVING (ADL)
ADLS_ACUITY_SCORE: 28
DRESS: INDEPENDENT
ORAL_HYGIENE: INDEPENDENT
ADLS_ACUITY_SCORE: 28
HYGIENE/GROOMING: INDEPENDENT
LAUNDRY: UNABLE TO COMPLETE

## 2022-07-21 NOTE — PROGRESS NOTES
"[facility], [place]   Psychiatric Progress Note  Hospital Day: 5        Interim History:   The patient's care was discussed with the treatment team during the daily team meeting and/or staff's chart notes were reviewed.  Staff report patient was making racial comments toward a vulnerable peer on station 20 and was transferred to station 12 as we had a private bed available. Patient did report generalized pain, for which Tylenol was given. Denied side effects. No behavioral issues since transfer to station 12. Patient did not require seclusion or restraints. Patient is not exhibiting overt signs/sx of psychosis or maría. Patient is medication adherent. Patient is attending groups. Patient is not sleeping well, slept 4 hours overnight. He reported severe anxiety. He was becoming agitated and was given prn Zyprexa. Patient is eating adequately. Patient is attending to ADLs.    Upon interview, the patient immediately was agitated after writer introduced self. He said that \"there is nothing you can do!\" He apparently called CARE program and they told him he is not on the waitlist so he suspects that his OP CM never actually placed him on the list. He repeatedly stated that he is only in the hospital because of welfare check he initiated on his family prior to admission. He said that the provider told him he was delusional, and that is why he was admitted. He said \"I told him I am not a danger to myself or others, and I still am not. Why the fuck am I here and on a commitment?\" He was extremely agitated, stating that doctors are \"dummies\" and \"liars.\" He is upset because he feels his OP team member, Agatha, is not communicating with him. He said that he completed [facility] in the past and wants to go there again. Stated that he would stay there and complete treatment, and again said that he \"has no idea\" why a locked CD program is recommended. He eventually asked writer to leave his room, " "and again said \"there is nothing more to say and I want to leave it at that.\"     Suicidal ideation: denies current or recent suicidal ideation or behaviors.    Homicidal ideation: denies current or recent homicidal ideation or behaviors.    Psychotic symptoms: Patient denies AH, VH, paranoia, delusions.     Medication side effects reported: No significant side effects.    Acute medical concerns: none reported    Other issues reported by patient: Patient had no further questions or concerns.           Medications:       polyethylene glycol  17 g Oral Daily     prazosin  1 mg Oral At Bedtime     QUEtiapine  100 mg Oral At Bedtime          Allergies:     Allergies   Allergen Reactions     Other Drug Allergy (See Comments)      Fake metal          Labs:   No results found for this or any previous visit (from the past 24 hour(s)).       Psychiatric Examination:     /61 (BP Location: Right arm, Patient Position: Supine, Cuff Size: Adult Regular)   Pulse 76   Temp 98.8  F (37.1  C) (Tympanic)   Resp 17   Ht 1.803 m (5' 11\")   Wt 89 kg (196 lb 3.2 oz)   SpO2 99%   BMI 27.36 kg/m    Weight is 196 lbs 3.2 oz  Body mass index is 27.36 kg/m .    Weight over time:  Vitals:    07/15/22 1226 07/16/22 0052 07/19/22 0800   Weight: 90.7 kg (199 lb 15.3 oz) 85.7 kg (189 lb) 89 kg (196 lb 3.2 oz)       Orthostatic Vitals  Report    None            Cardiometabolic risk assessment. 07/21/22      Reviewed patient profile for cardiometabolic risk factors    Date taken /Value  REFERENCE RANGE   Abdominal Obesity  (Waist Circumference)   See nursing flowsheet Women ?35 in (88 cm)   Men ?40 in (102 cm)      Triglycerides  Triglycerides   Date Value Ref Range Status   07/16/2022 116 <150 mg/dL Final       ?150 mg/dL (1.7 mmol/L) or current treatment for elevated triglycerides   HDL cholesterol  Direct Measure HDL   Date Value Ref Range Status   07/16/2022 46 >=40 mg/dL Final   ]   Women <50 mg/dL (1.3 mmol/L) in women or current " "treatment for low HDL cholesterol  Men <40 mg/dL (1 mmol/L) in men or current treatment for low HDL cholesterol     Fasting plasma glucose (FPG) Lab Results   Component Value Date    GLC 90 07/16/2022    GLC 88 09/08/2021        FPG ?100 mg/dL (5.6 mmol/L) or treatment for elevated blood glucose   Blood pressure  BP Readings from Last 3 Encounters:   07/21/22 119/61   02/04/22 (!) 158/92   12/22/21 (!) 142/85    Blood pressure ?130/85 mmHg or treatment for elevated blood pressure   Family History  See family history     Oriented to:  Grossly Oriented  General: Awake and Alert, adequately groomed  Appearance:  appears stated age, Grooming is adequate, Dressed in hospital scrubs and appropriate weight  Behavior:  uncooperative, agitated, yelling at writer  Eye Contact:  fair, intense at times  Psychomotor: Significant psychomotor agitation, pulling his hair, clenching fists, thrashing in bed when expressing his frustration with treatment plan, transfer, and ongoing hospitalization  Speech:  raised volume, rapid, difficult to interrupt due to agitation  Language: Fluent in English with appropriate syntax and vocabulary.  Mood:  \"frustrated\"   Affect:  mood congruent, heightened intensity, labile  Thought Process:  coherent, linear, minimizing  Thought Content: No SI/HI/AH/VH; No apparent delusions  Associations:  no loosening of associations  Insight:  limited  Judgment:  fair  Attention Span: adequate for conversation  Concentration:  grossly intact  Recent and Remote Memory:  grossly intact   Fund of Knowledge: average  Clinical Global Impressions  First:7     Most recent:5              Precautions:     Behavioral Orders   Procedures     Assault precautions     Code 1 - Restrict to Unit     Routine Programming     As clinically indicated     Status 15     Every 15 minutes.          Diagnoses:     Psychosis, unspecified (substance induced vs primary psychotic illness)  Amphetamine Use Disorder, severe  Cocaine Use " Disorder, severe  Sedative Hypnotic Use Disorder, severe  Opiate Use Disorder, unspecified severity  Major depressive disorder, recurrent episode, moderate   Generalized anxiety disorder         Assessment & Plan:     Assessment and hospital summary:  See Mendenhall is a 31 year old gentleman with a past psychiatric diagnosis of  Bipolar 2 disorder, substance use disorder, depressive disorder and anxiety disorder, PTSD and ADHD. He was admitted from the ER on 07/16/2022 where he was brought by the police due to concern for psychosis and SI with plan. This in the context of methamphetamine and cocaine use, unclear regarding medication non-adherence, he has significant history of substance use and psychosocial stressors including housing instability, unemployment, legal issues, trauma and chronic mental health issues.     He was brought to the ED with SI, erratic and impulsive behaviors and psychosis (paranoid delusions and disorganized behavior/thought process) in the context of methamphetamine and cocaine use.  His last psychiatric hospitalization was in April/2022 at [facility] for psychosis in context of substance use.  He is currently followed by PCP Erinn Maradiaga at [facility]. Current psychosocial stressors include legal issues, trauma, chronic mental health issues, family dynamics and medical issues which he has been coping with by using substances, acting out to self and acting out to others.  Patient's support system includes sister Samaria and friend Wilbert.       Substance use does appear to be playing a contributing role in the patient's presentation. Medical history does not appear to be significant, although chart review indicates history of chronic back pain, prescribed anabolic steroids that may be playing a role in presentation. In utero exposure and developmental delay need to be assessed.       Psychosocially history of trauma in childhood, long history of substance use,  currently being unemployed and experiencing housing insecurity, legal issues, not being able to see his daughter represent both precipitating and perpetuating factors contributing to presentation.      The MSE is notable for some persisting paranoia, mood lability, being guarded, tangentiality and limited attention span/concentration, hyper-talkativeness difficult to interrupt speech. He denies self injurious behaviors. His reported symptoms of VH, paranoid delusions and grossly disorganized thought process in the context of methamphetamine and cocaine use are suggestive of substance-induced psychosis, although definitive diagnosis is still in evolution and further collateral information from family/ outpatient provider is needed at this time; differential includes primary psychotic disorder exacerbated by substance use, PTSD, Bipolar Disorder, schizoaffective disorder.  Additionally, he has traits of impulssive behaviors which interfere with his ability to adhere with the treatment plan.       Optimization of medications to target these symptom clusters in addition to evaluation of adquate outpatient supports will be targets for treatment during this admission.      Pt has history of previous attempts and substance use representing heightened acute risk for self/others patient warrants inpatient psychiatric hospitalization to maintain his safety until door to door treatment can be arranged. Disposition pending clinical stabilization, medication optimization and development of an appropriate discharge plan, including CD treatment referral upon discharge.    Psychiatric course:  See Mendenhall was admitted to Station 20 on a 72 hour hold. His PTA prazosin was continued.      See Mendenhall continued to meet criteria for inpatient hospitalization medication optimization, inpatient stabilization, and appropriate discharge planning.      (7/19/2022) - added Seroquel 50 mg BEDTIME; 25 mg TID PRN     (7/20/2022) -  Quetiapine (Seroquel) increased to 100 mg, BEDTIME scheduled. Keep 25 mg TID PRN     Medical course:   See Mendenhall was physically examined by the ED prior to being transferred to the unit and was found to be medically stable and appropriate for admission.      Target psychiatric symptoms and interventions:  Add Seroquel 100 mg at bedtime prn for sleep disturbances  Increase prn Seroquel from 25 mg TID to 25-50 mg TID prn  Continue all other medications without changes.       Risks, benefits, and alternatives discussed at length with patient.     Acute Medical Problems and Treatments:  - No acute medical concerns    Behavioral/Psychological/Social:  - Encourage unit programming    Safety:  - Continue precautions as noted above  - Status 15 minute checks    Legal Status: full commitment with Das ( Zyprexa, Seroquel, Haldol, Prolixin, Abilify, Risperdal, and Invega)    Disposition Plan   Reason for ongoing admission: poses an imminent risk to self  Discharge location: Chemical dependency treatment facility  Discharge Medications: not ordered  Follow-up Appointments: not scheduled    Entered by: Licha Ferguson MD on 7/21/2022 at 1:11 PM

## 2022-07-21 NOTE — PLAN OF CARE
"Pt spent most of day isolated to room, laying in bed. Pt in \"ok\" mood with full range affect. When writer greeted pt this am, pt began to verbalize frustration with current hospitalization d/t want to be dc'd to CD treatment center. Pt states \"I am not a risk to myself or anyone else. I don't understand why I'm being held here in hell.\" Pt was frustrated but calm during exchange with writer.    Pt denies SI, SIB, HI, A/V hallucinations.     Provider rounding, in room with patient when pt became loud during conversation. No threatened physical aggressiveness noted.    Pt was med compliant, mostly calm and cooperative.    VS reviewed: /61 (BP Location: Right arm, Patient Position: Supine, Cuff Size: Adult Regular)   Pulse 76   Temp 98.8  F (37.1  C) (Tympanic)   Resp 17   Ht 1.803 m (5' 11\")   Wt 89 kg (196 lb 3.2 oz)   SpO2 99%   BMI 27.36 kg/m   . Patient denies pain.    Length of stay: 5  "

## 2022-07-21 NOTE — PROGRESS NOTES
"Pt participated in dance/movement therapy (DMT.)  He interrupted watching a movie in his room to join strengthening and opening movements using stretching and rhythmic lower body strengthening that he initiated.  This therapist added music and organization along with visual metaphors that supported body-based therapeutic themes of self-respect combined with compassion for others.  Pt smiled often and verbalized affirmations of his processes, saying things like \"I gotta do this more often\" and \"ya, that makes sense!\"  He enjoyed high energy, quick interventions, then stated he didn't want to get too sweaty, so wanted to return to his movie.  He was polite and thanked this therapist.       07/21/22 1330   Group Therapy Session   Group Attendance attended group session   Total Time patient participated (minutes) 30   Group Type expressive therapy;psychotherapeutic   Group Topic Covered balanced lifestyle;coping skills/lifestyle management;relaxation techniques;self-care activities   Group Session Detail DMT     "

## 2022-07-21 NOTE — PLAN OF CARE
Problem: Sleep Disturbance  Goal: Adequate Sleep/Rest  Outcome: Ongoing, Progressing    Patient was awake at about 2330, approached staff at the nursing station and complained of not being able to fall asleep. Patient stated he got prn Hydroxyzine at about 2230 and has been feeling a twitching sensation, unable to sleep and feeling very anxious. Patient's V.S were taken and stable. AK=716/61, P=76, 02= 97%. Patient rated anxiety 10/10 and requested another prn antianxiety. Prn Seroquel was available. Patient requested to try Seroquel. After administration at 2335, patient reported he was still unable to sleep and was getting very agitated. Prn Olanzapine 10 mg was given at 0058. Patient later came out at 0119 and reported generalized pain of 10/10. Prn Tylenol was given. When observed, patient was sleeping uneventfully. No further complaint of insomnia or any twitching sensation. As at 0600, pt had slept a total of 4 hours. Will continue to monitor.

## 2022-07-21 NOTE — PLAN OF CARE
Assessment/Intervention/Current Symtoms and Care Coordination  The patient's care was discussed with the treatment team and chart notes were reviewed.     CTC continues to await call back from patient's Cty CM  Agatha Flores [phone] regarding CARE referral status.  Pt expressed frustration that when he called CARE facilities, they stated that he is not on their list.    Cty CM to place patient on CARE wait list due to his repeatedly going AMA from treatment facilities, relapsing, non compliance with meds.     Discharge Plan or Goal  CARE facility     Barriers to Discharge   Acute maría  Legal (criminal) status is unclear- Cty ALFR[facility]O attempting to clarify this with Karyn Browne.  Placement to CARE facility     Referral Status  Patient's Cty CM is placing patient on the CARE wait list     Legal Status     Patient is civilly committed with a Das order through [facility]

## 2022-07-22 LAB — SARS-COV-2 RNA RESP QL NAA+PROBE: NEGATIVE

## 2022-07-22 PROCEDURE — 250N000013 HC RX MED GY IP 250 OP 250 PS 637

## 2022-07-22 PROCEDURE — 250N000013 HC RX MED GY IP 250 OP 250 PS 637: Performed by: PSYCHIATRY & NEUROLOGY

## 2022-07-22 PROCEDURE — 124N000002 HC R&B MH UMMC

## 2022-07-22 PROCEDURE — 87635 SARS-COV-2 COVID-19 AMP PRB: CPT

## 2022-07-22 PROCEDURE — G0177 OPPS/PHP; TRAIN & EDUC SERV: HCPCS

## 2022-07-22 PROCEDURE — 99233 SBSQ HOSP IP/OBS HIGH 50: CPT | Mod: GC | Performed by: PSYCHIATRY & NEUROLOGY

## 2022-07-22 RX ORDER — LANOLIN ALCOHOL/MO/W.PET/CERES
3 CREAM (GRAM) TOPICAL AT BEDTIME
Status: DISCONTINUED | OUTPATIENT
Start: 2022-07-22 | End: 2022-08-29 | Stop reason: HOSPADM

## 2022-07-22 RX ADMIN — QUETIAPINE FUMARATE 100 MG: 100 TABLET ORAL at 21:57

## 2022-07-22 RX ADMIN — HYDROXYZINE HYDROCHLORIDE 25 MG: 25 TABLET ORAL at 21:31

## 2022-07-22 RX ADMIN — NICOTINE POLACRILEX 4 MG: 4 GUM, CHEWING BUCCAL at 20:17

## 2022-07-22 RX ADMIN — NICOTINE POLACRILEX 4 MG: 4 GUM, CHEWING BUCCAL at 13:07

## 2022-07-22 RX ADMIN — HYDROXYZINE HYDROCHLORIDE 25 MG: 25 TABLET ORAL at 00:55

## 2022-07-22 RX ADMIN — POLYETHYLENE GLYCOL 3350 17 G: 17 POWDER, FOR SOLUTION ORAL at 12:34

## 2022-07-22 RX ADMIN — MELATONIN TAB 3 MG 3 MG: 3 TAB at 21:31

## 2022-07-22 RX ADMIN — PRAZOSIN HYDROCHLORIDE 1 MG: 1 CAPSULE ORAL at 20:26

## 2022-07-22 RX ADMIN — QUETIAPINE FUMARATE 100 MG: 100 TABLET ORAL at 03:16

## 2022-07-22 ASSESSMENT — ACTIVITIES OF DAILY LIVING (ADL)
ADLS_ACUITY_SCORE: 28
ORAL_HYGIENE: INDEPENDENT
ADLS_ACUITY_SCORE: 28
ADLS_ACUITY_SCORE: 28
HYGIENE/GROOMING: INDEPENDENT
ADLS_ACUITY_SCORE: 28
ORAL_HYGIENE: INDEPENDENT
ADLS_ACUITY_SCORE: 28
DRESS: INDEPENDENT
ADLS_ACUITY_SCORE: 28
HYGIENE/GROOMING: INDEPENDENT
ADLS_ACUITY_SCORE: 28
DRESS: INDEPENDENT
LAUNDRY: WITH SUPERVISION
ADLS_ACUITY_SCORE: 28

## 2022-07-22 NOTE — PLAN OF CARE
RN Assessment:    Pt presented with euthymic affect. Pt was calm and cooperative while interacting with the writer. Pt was alert and oriented x 4. Pt denied having SI, HI, thoughts of SIB, and hallucinations. Pt denied having physical pain. Pt denied having medical concerns. Pt endorsed sleeping poorly last night. Pt endorsed feeling that the medications that are currently ordered are working well. Pt would not elaborate any further on the subject. Pt endorsed restlessness as only medication side effect. No medication side effects observed by writer. Pt was isolative this shift. Continue to monitor for safety and changes in medical condition.       PRN Medications passed this shift:    1307: Nicotine gum 4 mg PO to aid with smoking cessation. This medication was effective per pt report.

## 2022-07-22 NOTE — PROGRESS NOTES
"[facility], [place]   Psychiatric Progress Note  Hospital Day: 6        Interim History:   Overnight Staff Summary:  \"Patient visible on the unit and spent time in room watching TV. Patient denies SI SIB HI and auditory or visual hallucinations. Patient agitated about being here stating, \"all I wanted was a wellness check and they threw me in here. I asked them for help and this is what I get\". Patient appreciated being able to \"vent\" to this writer. Patient states he really only needs treatment for substance abuse and to, \"get on with his life\". Patient advised to speak with his doctor constructively, and tell them this information and to work with the existing care plan, while expressing aftercare needs. Patient acknowledged this calmly.     Ate 100% of dinner, did shower this shift, medication compliant. PRN hydroxyzine 25 mg for anxiety , later stating he feels a bit calmer. Appropriate on the unit doing word finds and talking with select peers. \"    \"Patient slept poorly, requested for hydroxyzine 25 mg at about 0053 and melatonin at 0316 (no order), patient accepted prn Seroquel 100 mg with little effect as patient slept for 1.5 hours overall. Patient remained calm in his room. Denies SI/HI, A/VH or SIB. No evidence of withdrawal or distress. No symptoms of maría or psychosis reported or observed. Will continue to monitor.\"    Patient Med Cui was seen in his room as a part of team rounds. Started conversation by saying that he is feeling upset and that it isn't intended to be directed at the care team but that he is upset about the situation he is in. Mentions multiple times that he is not delusional or psychotic, but acknowledges that he had symptoms like that when he was \"high as f--k\". Primary source of frustration right now is that the CARE referral was not placed or listed when he called the facility for an update, and he is upset that \"someone can overrule and entire " "doctor team and not do her job\". With several attempts able to refocus on today and has multiple requests regarding medications that he says he was prescribed previously and wants to take while hospitalized because \"who knows how long I am going to be here\". Specific medications mentioned include suboxone, ativan, and lyrica. Expressed that he is in agreement about going to treatment and that he just wants to get started and the waiting part is the greatest part of what is causing him distress. Feels that his health (physical and mental) are \"backsliding\" while he is in the hospital.    -----------------------------------------------------------  Suicidal ideation: denies current or recent suicidal ideation or behaviors.    Homicidal ideation: denies current or recent homicidal ideation or behaviors.    Psychotic symptoms: Patient denies AH, VH, paranoia, delusions.     Medication side effects reported: No significant side effects.    Acute medical concerns: none reported    Other issues reported by patient: Patient had no further questions or concerns.           Medications:       polyethylene glycol  17 g Oral Daily     prazosin  1 mg Oral At Bedtime     QUEtiapine  100 mg Oral At Bedtime          Allergies:     Allergies   Allergen Reactions     Other Drug Allergy (See Comments)      Fake metal          Labs:   No results found for this or any previous visit (from the past 24 hour(s)).       Psychiatric Examination:     /69 (BP Location: Left arm, Patient Position: Sitting, Cuff Size: Adult Regular)   Pulse 84   Temp 98.7  F (37.1  C) (Tympanic)   Resp 16   Ht 1.803 m (5' 11\")   Wt 89 kg (196 lb 3.2 oz)   SpO2 96%   BMI 27.36 kg/m    Weight is 196 lbs 3.2 oz  Body mass index is 27.36 kg/m .    Weight over time:  Vitals:    07/15/22 1226 07/16/22 0052 07/19/22 0800   Weight: 90.7 kg (199 lb 15.3 oz) 85.7 kg (189 lb) 89 kg (196 lb 3.2 oz)     Cardiometabolic risk assessment. 07/21/22      Reviewed " "patient profile for cardiometabolic risk factors    Date taken /Value  REFERENCE RANGE   Abdominal Obesity  (Waist Circumference)   See nursing flowsheet Women ?35 in (88 cm)   Men ?40 in (102 cm)      Triglycerides  Triglycerides   Date Value Ref Range Status   07/16/2022 116 <150 mg/dL Final       ?150 mg/dL (1.7 mmol/L) or current treatment for elevated triglycerides   HDL cholesterol  Direct Measure HDL   Date Value Ref Range Status   07/16/2022 46 >=40 mg/dL Final   ]   Women <50 mg/dL (1.3 mmol/L) in women or current treatment for low HDL cholesterol  Men <40 mg/dL (1 mmol/L) in men or current treatment for low HDL cholesterol     Fasting plasma glucose (FPG) Lab Results   Component Value Date    GLC 90 07/16/2022    GLC 88 09/08/2021        FPG ?100 mg/dL (5.6 mmol/L) or treatment for elevated blood glucose   Blood pressure  BP Readings from Last 3 Encounters:   07/21/22 112/69   02/04/22 (!) 158/92   12/22/21 (!) 142/85    Blood pressure ?130/85 mmHg or treatment for elevated blood pressure   Family History  See family history     Oriented to:  Grossly Oriented  General: Awake and Alert, adequately groomed  Appearance:  appears stated age, Grooming is adequate, Dressed in hospital scrubs and appropriate weight  Behavior: initially difficult to engage in meaningful conversation, required extensive redirection and interruption to continue conversation.  Eye Contact:  fair, intense at times  Psychomotor: Significant psychomotor agitation, but less extreme than previous and able to self-regulate better when interrupted and redirected at times  Speech:  raised volume, rapid, difficult to interrupt due to agitation  Language: Fluent in English with appropriate syntax and vocabulary.  Mood:  \"I'm pissed off\"   Affect:  mood congruent, heightened intensity, labile  Thought Process:  coherent, linear, future-oriented, minimizing  Thought Content: No SI/HI/AH/VH; No apparent delusions; acknowledges AH/VH in the past " while actively using substances  Associations:  no loosening of associations  Insight:  limited  Judgment:  fair  Attention Span: adequate for conversation  Concentration:  grossly intact  Recent and Remote Memory:  grossly intact   Fund of Knowledge: average         Precautions:     Behavioral Orders   Procedures     Assault precautions     Code 1 - Restrict to Unit     Routine Programming     As clinically indicated     Status 15     Every 15 minutes.          Diagnoses:     Psychosis, unspecified (substance induced vs primary psychotic illness)  Amphetamine Use Disorder, severe  Cocaine Use Disorder, severe  Sedative Hypnotic Use Disorder, severe  Opiate Use Disorder, unspecified severity  Major depressive disorder, recurrent episode, moderate   Generalized anxiety disorder         Assessment & Plan:     Assessment and hospital summary:  See Mendenhall is a 31 year old gentleman with a past psychiatric diagnosis of  Bipolar 2 disorder, substance use disorder, depressive disorder and anxiety disorder, PTSD and ADHD. He was admitted from the ER on 07/16/2022 where he was brought by the police due to concern for psychosis and SI with plan. This in the context of methamphetamine and cocaine use, unclear regarding medication non-adherence, he has significant history of substance use and psychosocial stressors including housing instability, unemployment, legal issues, trauma and chronic mental health issues.     He was brought to the ED with SI, erratic and impulsive behaviors and psychosis (paranoid delusions and disorganized behavior/thought process) in the context of methamphetamine and cocaine use.  His last psychiatric hospitalization was in April/2022 at [facility] for psychosis in context of substance use.  He is currently followed by Erinn Frazier at [facility]. Current psychosocial stressors include legal issues, trauma, chronic mental health issues, family dynamics and medical  issues which he has been coping with by using substances, acting out to self and acting out to others.  Patient's support system includes sister Samaria and friend Wilbert.       Substance use does appear to be playing a contributing role in the patient's presentation. Medical history does not appear to be significant, although chart review indicates history of chronic back pain, prescribed anabolic steroids that may be playing a role in presentation. In utero exposure and developmental delay need to be assessed.       Psychosocially history of trauma in childhood, long history of substance use, currently being unemployed and experiencing housing insecurity, legal issues, not being able to see his daughter represent both precipitating and perpetuating factors contributing to presentation.      The MSE is notable for some persisting paranoia, mood lability, being guarded, tangentiality and limited attention span/concentration, hyper-talkativeness difficult to interrupt speech. He denies self injurious behaviors. His reported symptoms of VH, paranoid delusions and grossly disorganized thought process in the context of methamphetamine and cocaine use are suggestive of substance-induced psychosis, although definitive diagnosis is still in evolution and further collateral information from family/ outpatient provider is needed at this time; differential includes primary psychotic disorder exacerbated by substance use, PTSD, Bipolar Disorder, schizoaffective disorder.  Additionally, he has traits of impulssive behaviors which interfere with his ability to adhere with the treatment plan.       Optimization of medications to target these symptom clusters in addition to evaluation of adquate outpatient supports will be targets for treatment during this admission.      Pt has history of previous attempts and substance use representing heightened acute risk for self/others patient warrants inpatient psychiatric hospitalization to  maintain his safety until door to door treatment can be arranged. Disposition pending clinical stabilization, medication optimization and development of an appropriate discharge plan, including CD treatment referral upon discharge.    Psychiatric course:  See Mendenhall was admitted to [facility] on a 72 hour hold. His PTA prazosin was continued.      See Mendenhall continued to meet criteria for inpatient hospitalization medication optimization, inpatient stabilization, and appropriate discharge planning.      (7/19/2022) - added Seroquel 50 mg BEDTIME; 25 mg TID PRN   (7/20/2022) - Quetiapine (Seroquel) increased to 100 mg, BEDTIME scheduled. Keep 25 mg TID PRN  (7/20/2022) - melatonin 3mg scheduled per patient request    Medical course:   See Mendenhall was physically examined by the ED prior to being transferred to the unit and was found to be medically stable and appropriate for admission.      Target psychiatric symptoms and interventions:  Add Seroquel 100 mg at bedtime prn for sleep disturbances  Continue prn Seroquel from 25 mg TID to 25-50 mg TID prn  Continue all other medications without changes.     Risks, benefits, and alternatives discussed at length with patient.     Acute Medical Problems and Treatments:  - No acute medical concerns    Behavioral/Psychological/Social:  - Encourage unit programming    Safety:  - Continue precautions as noted above  - Status 15 minute checks    Legal Status: full commitment with [facility] ( Zyprexa, Seroquel, Haldol, Prolixin, Abilify, Risperdal, and Invega)    Disposition Plan   Reason for ongoing admission: poses an imminent risk to self  Discharge location: Chemical dependency treatment facility  Discharge Medications: not ordered  Follow-up Appointments: not scheduled    Entered by: Tanner Puga MD on 7/21/2022 at 8:37 AM

## 2022-07-22 NOTE — PLAN OF CARE
Assessment/Intervention/Current Symtoms and Care Coordination  The patient's care was discussed with the treatment team and chart notes were reviewed.      [facility] completed almost all of the CARE referral and sent it to Agatha Flores pt's CM, to finish the final pieces and send into central preadmission.       Discharge Plan or Goal  CARE facility     Barriers to Discharge   Acute maría  Legal (criminal) status is unclear- [facility] CM attempting to clarify this with Karyn Browne.  Placement to CARE facility     Referral Status  Patient's [facility] CM is placing patient on the CARE wait list     Legal Status     Patient is civilly committed with a [facility] order through [facility]

## 2022-07-22 NOTE — PLAN OF CARE
"  Problem: Behavioral Health Plan of Care  Goal: Adheres to Safety Considerations for Self and Others  Outcome: Ongoing, Progressing    Patient visible on the unit and spent time in room watching TV. Patient denies SI SIB HI and auditory or visual hallucinations. Patient agitated about being here stating, \"all I wanted was a wellness check and they threw me in here. I asked them for help and this is what I get\". Patient appreciated being able to \"vent\" to this writer. Patient states he really only needs treatment for substance abuse and to, \"get on with his life\". Patient advised to speak with his doctor constructively, and tell them this information and to work with the existing care plan, while expressing aftercare needs. Patient acknowledged this calmly.    Ate 100% of dinner, did shower this shift, medication compliant. PRN hydroxyzine 25 mg for anxiety , later stating he feels a bit calmer. Appropriate on the unit doing word finds and talking with select peers.                       "

## 2022-07-22 NOTE — PLAN OF CARE
Problem: Sleep Disturbance  Goal: Adequate Sleep/Rest  Outcome: Ongoing, Not Progressing  Note: Patient up most of the night, requested for hydroxyzine at about 0055 and later melatonin (no order) accepted 100 mg prn Seroquel at about 0316. Patient however remained calm in his room, no behavioral disturbance.   Goal Outcome Evaluation:      Patient slept poorly, requested for hydroxyzine 25 mg at about 0053 and melatonin at 0316 (no order), patient accepted prn Seroquel 100 mg with little effect as patient slept for 1.5 hours overall. Patient remained calm in his room. Denies SI/HI, A/VH or SIB. No evidence of withdrawal or distress. No symptoms of maría or psychosis reported or observed. Will continue to monitor.         Alvin Tracy DNP, RN, APRN, CNS, AGCNS-BC.

## 2022-07-22 NOTE — PROGRESS NOTES
2052 Hydroxyzine 25 mg given for anxiety. Will check in one hour for any improvement. Patient stated talking about frustrations helps as well.

## 2022-07-23 PROCEDURE — 250N000013 HC RX MED GY IP 250 OP 250 PS 637

## 2022-07-23 PROCEDURE — 250N000013 HC RX MED GY IP 250 OP 250 PS 637: Performed by: PSYCHIATRY & NEUROLOGY

## 2022-07-23 PROCEDURE — 124N000002 HC R&B MH UMMC

## 2022-07-23 RX ADMIN — HYDROXYZINE HYDROCHLORIDE 25 MG: 25 TABLET ORAL at 01:39

## 2022-07-23 RX ADMIN — MELATONIN TAB 3 MG 3 MG: 3 TAB at 21:07

## 2022-07-23 RX ADMIN — QUETIAPINE FUMARATE 100 MG: 100 TABLET ORAL at 21:07

## 2022-07-23 RX ADMIN — POLYETHYLENE GLYCOL 3350 17 G: 17 POWDER, FOR SOLUTION ORAL at 11:48

## 2022-07-23 RX ADMIN — NICOTINE POLACRILEX 4 MG: 4 GUM, CHEWING BUCCAL at 17:58

## 2022-07-23 RX ADMIN — OLANZAPINE 5 MG: 5 TABLET, FILM COATED ORAL at 21:51

## 2022-07-23 RX ADMIN — NICOTINE POLACRILEX 4 MG: 4 GUM, CHEWING BUCCAL at 14:41

## 2022-07-23 RX ADMIN — NICOTINE POLACRILEX 4 MG: 4 GUM, CHEWING BUCCAL at 11:49

## 2022-07-23 RX ADMIN — PRAZOSIN HYDROCHLORIDE 1 MG: 1 CAPSULE ORAL at 21:07

## 2022-07-23 RX ADMIN — HYDROXYZINE HYDROCHLORIDE 25 MG: 25 TABLET ORAL at 18:11

## 2022-07-23 RX ADMIN — NICOTINE POLACRILEX 4 MG: 4 GUM, CHEWING BUCCAL at 21:09

## 2022-07-23 RX ADMIN — ACETAMINOPHEN 325MG 650 MG: 325 TABLET ORAL at 01:39

## 2022-07-23 RX ADMIN — NICOTINE POLACRILEX 4 MG: 4 GUM, CHEWING BUCCAL at 13:20

## 2022-07-23 ASSESSMENT — ACTIVITIES OF DAILY LIVING (ADL)
ORAL_HYGIENE: INDEPENDENT
ADLS_ACUITY_SCORE: 28
HYGIENE/GROOMING: INDEPENDENT
HYGIENE/GROOMING: INDEPENDENT
DRESS: SCRUBS (BEHAVIORAL HEALTH)
ADLS_ACUITY_SCORE: 28
LAUNDRY: UNABLE TO COMPLETE
ADLS_ACUITY_SCORE: 28
LAUNDRY: UNABLE TO COMPLETE
ADLS_ACUITY_SCORE: 28
ADLS_ACUITY_SCORE: 28
DRESS: SCRUBS (BEHAVIORAL HEALTH)
ORAL_HYGIENE: INDEPENDENT
ADLS_ACUITY_SCORE: 28

## 2022-07-23 NOTE — PLAN OF CARE
Problem: Behavioral Health Plan of Care  Goal: Adheres to Safety Considerations for Self and Others  Outcome: Ongoing, Progressing  Note: Patient is able to make his needs known, adheres to safety consideration for self and to others.  Goal: Absence of New-Onset Illness or Injury  Outcome: Ongoing, Progressing  Note: Patient reports no new onset of illness or injury     Problem: Suicidal Behavior  Goal: Suicidal Behavior is Absent or Managed  Outcome: Ongoing, Progressing  Note: Patient expresses no suicidal ideation, plan or intent this night.   Goal Outcome Evaluation:    Patient was up at about 0140, requested prn Tylenol for pain rated 10/10 and hydroxyzine 25 mg for anxiety. Patient was sleeping at the time of reassessment. Patient denies SI/HI, A/VH and SIB. Patient reports no behavioral issus, no paranoia, Physical or verbal aggression toward staff or peers. No symptoms of withdrawal or distress noticed. Will continue to monitor and follow plan of care.      Alvin Tracy, DNP, RN, APRN, CNS, AGCNS-BC.

## 2022-07-23 NOTE — PLAN OF CARE
Problem: Sleep Disturbance  Goal: Adequate Sleep/Rest  Outcome: Ongoing, Progressing       The patient was in bed at the beginning of the shift. The patient's respiration was even and unlabored. Safety checks were performed every 15 minutes with no incidence. Will continue to monitor.

## 2022-07-23 NOTE — PLAN OF CARE
Problem: Behavioral Health Plan of Care  Goal: Optimal Comfort and Wellbeing  Outcome: Ongoing, Progressing  Goal: Adheres to Safety Considerations for Self and Others  Intervention: Develop and Maintain Individualized Safety Plan  Recent Flowsheet Documentation  Taken 7/22/2022 1712 by Calrene Candelario RN  Safety Measures: safety rounds completed  Goal: Absence of New-Onset Illness or Injury  Intervention: Identify and Manage Fall Risk  Recent Flowsheet Documentation  Taken 7/22/2022 1712 by Carlene Candelario RN  Safety Measures: safety rounds completed  Goal: Optimized Coping Skills in Response to Life Stressors  Intervention: Promote Effective Coping Strategies  Recent Flowsheet Documentation  Taken 7/22/2022 1712 by Carlene Candelario RN  Supportive Measures: active listening utilized     Pt was visible in the milieu and socialized with peers  most off the shift.  PRN Atarax administered at 2131 for anxiety and was effective  Pt also played card with a selected in the lounge  Pt affect is bright, calm and cooperative this shift  15 minutes safety checks completed throughout the shift and no problem identified. Respirations are even and unlabored.   No precautionary behavior noted or reported.  Pt is on assault  precaution. No harm/assault  to self and others noted or reported this shift.   Pt denies  SI/HI, delusions, or hallucination this shift. Pt contracted for safety and was medication compliant  Will continue to monitor and assist as needed.

## 2022-07-23 NOTE — PLAN OF CARE
"Problem: Behavioral Health Plan of Care  Goal: Develops/Participates in Therapeutic [place] to Support Successful Transition  Outcome: Ongoing, Progressing  Intervention: Foster Therapeutic [place]  Recent Flowsheet Documentation  Taken 7/23/2022 1248 by Ryne Lam RN  Trust Relationship/Rapport:    care explained    emotional support provided    reassurance provided  Patient was asleep at the beginning of the shift and woke up for breakfast. Writer approached patient and upon interactions patient appeared irritable and not interested in talking with the writer. Later in the day, patient' affect improved and he appeared calmer, and brighter. He was able to participate in the assessment with the writer. Patient expressed his desire to be discharged/transfered to a different facility and his frustrations with his placement status into a chemical dependence program. Writer acknowledged patient's frustration and provided reassurance and support. Patient denies thoughts of harming self/other, auditory hallucinations, depression and he reported feeling less anxious today than previously. No delusions noted. No acute behavioral concern this shift.     Patient is medication compliant. No stated or observed med side effects. Patient is eating and hydrating adequately. No BM this shift. Patient denies pain or any other physical discomfort. VSS /71 (BP Location: Left arm)   Pulse 85   Temp 98.2  F (36.8  C) (Tympanic)   Resp 19   Ht 1.803 m (5' 11\")   Wt 89 kg (196 lb 3.2 oz)   SpO2 96%   BMI 27.36 kg/m      "

## 2022-07-24 PROCEDURE — 250N000013 HC RX MED GY IP 250 OP 250 PS 637: Performed by: PSYCHIATRY & NEUROLOGY

## 2022-07-24 PROCEDURE — 250N000013 HC RX MED GY IP 250 OP 250 PS 637

## 2022-07-24 PROCEDURE — 124N000002 HC R&B MH UMMC

## 2022-07-24 RX ADMIN — MELATONIN TAB 3 MG 3 MG: 3 TAB at 19:44

## 2022-07-24 RX ADMIN — QUETIAPINE FUMARATE 100 MG: 100 TABLET ORAL at 19:44

## 2022-07-24 RX ADMIN — HYDROXYZINE HYDROCHLORIDE 25 MG: 25 TABLET ORAL at 03:48

## 2022-07-24 RX ADMIN — NICOTINE POLACRILEX 4 MG: 4 GUM, CHEWING BUCCAL at 19:45

## 2022-07-24 RX ADMIN — ACETAMINOPHEN 325MG 650 MG: 325 TABLET ORAL at 03:12

## 2022-07-24 RX ADMIN — QUETIAPINE FUMARATE 50 MG: 25 TABLET ORAL at 03:48

## 2022-07-24 RX ADMIN — QUETIAPINE FUMARATE 100 MG: 100 TABLET ORAL at 01:02

## 2022-07-24 RX ADMIN — PRAZOSIN HYDROCHLORIDE 1 MG: 1 CAPSULE ORAL at 19:44

## 2022-07-24 RX ADMIN — NICOTINE POLACRILEX 4 MG: 4 GUM, CHEWING BUCCAL at 01:50

## 2022-07-24 RX ADMIN — NICOTINE POLACRILEX 4 MG: 4 GUM, CHEWING BUCCAL at 16:52

## 2022-07-24 RX ADMIN — POLYETHYLENE GLYCOL 3350 17 G: 17 POWDER, FOR SOLUTION ORAL at 11:49

## 2022-07-24 RX ADMIN — QUETIAPINE FUMARATE 100 MG: 100 TABLET ORAL at 23:47

## 2022-07-24 RX ADMIN — ACETAMINOPHEN 325MG 650 MG: 325 TABLET ORAL at 23:38

## 2022-07-24 RX ADMIN — HYDROXYZINE HYDROCHLORIDE 25 MG: 25 TABLET ORAL at 00:07

## 2022-07-24 ASSESSMENT — ACTIVITIES OF DAILY LIVING (ADL)
HYGIENE/GROOMING: INDEPENDENT
ADLS_ACUITY_SCORE: 28
HYGIENE/GROOMING: INDEPENDENT
ADLS_ACUITY_SCORE: 28
LAUNDRY: UNABLE TO COMPLETE
ADLS_ACUITY_SCORE: 28
ORAL_HYGIENE: INDEPENDENT
DRESS: SCRUBS (BEHAVIORAL HEALTH)
ADLS_ACUITY_SCORE: 28
LAUNDRY: UNABLE TO COMPLETE
ORAL_HYGIENE: INDEPENDENT
ADLS_ACUITY_SCORE: 28
ADLS_ACUITY_SCORE: 28
DRESS: SCRUBS (BEHAVIORAL HEALTH)
ADLS_ACUITY_SCORE: 28

## 2022-07-24 NOTE — PLAN OF CARE
Problem: Sleep Disturbance  Goal: Adequate Sleep/Rest  Outcome: Ongoing, Not Progressing  Note: Patient has been awake till this time (0115), utilized prn hydroxyzine and Seroquel for anxiety and sleep disturbance respectively.    7/24/2022 0124 by Alvin Tracy RN  Outcome: Ongoing, Progressing     Problem: Suicidal Behavior  Goal: Suicidal Behavior is Absent or Managed  Outcome: Ongoing, Progressing  Note: Patient endorses no suicidal ideation, plan or intent at the time of assessment.     Problem: Behavioral Health Plan of Care  Goal: Absence of New-Onset Illness or Injury  7/24/2022 0131 by Alvin Tracy RN  Outcome: Ongoing, Progressing  Note: Patient's blood pressure was checked per patient request, it was WNL. No new onset of illness or injury reported/observed.  7/24/2022 0124 by Alvin Tracy RN  Outcome: Ongoing, Progressing   Goal Outcome Evaluation:  Patient slept poorly this night continues to come out to request for prn medications (see Mar please). Sleeping at this time. Denies SI/HI, A/VH, or SIB. No behavior issues. Will continue to monitor and follow plan of care.                 Alvin Tracy DNP,RN, APRN, CNS, AGCNS-BC

## 2022-07-24 NOTE — PLAN OF CARE
Number of patients attending the group: 2  Group Length:  1 Hours  Started at 15:55  Ended 16:50    Group Therapy     Summary of Group / Topics Discussed:  The psychotherapy group goal is to promote insight to positive choice and change. Group processing is within a supportive and safe environment. Patients will process emotions using verbal group and expressive psychotherapy interventions. The goal of this group was to work towards decreasing negative thoughts and reactions that can exacerbate mental health conditions.  Group focused on effective strategies for maintaining positive attitude and thoughts during challenging situations. Patients identified challenging situations they had encountered recently. They also discussed how they responded to the situation. Group then processed effective and positive ways to respond to those in the future. Specifically, strategies such as empathetic communication, focusing on the problem and not the person, listening, and delayed response and open communication were reviewed.     Assessment: This patient participated and stayed for the entire group. Pt reported that he was upset due to being moved to this unit from another unit. Processed his feelings towards that. Received specific feedback on effective response to distressful situations.    Patient Response: Pt reported his willingness to practice the skills that were reviewed. Reported looking forward to another group.

## 2022-07-24 NOTE — PROGRESS NOTES
Patient appears restless, approached writer for prn Hydroxyzine 25 mg at 0007 for anxiety and at about 0102 patient requested for prn Seroquel 100 mg for sleep disturbance, B/P checked at the time was 123/77 HR 74 02 sat was 95% RA. Patient reassured and encouraged to rest in bed to have a maximum benefit from the Seroquel he just received. Patient was agreeable with this and went to his room, no further complaints at this time. Will continue to monitor.    Alvin Tracy DNP, RN, APRN, CNS, AGCNS-BC.

## 2022-07-24 NOTE — PLAN OF CARE
"Problem: Behavioral Health Plan of Care  Goal: Develops/Participates in Therapeutic [place] to Support Successful Transition  Outcome: Ongoing, Progressing  Intervention: Foster Therapeutic [place]    emotional support provided    empathic listening provided    reassurance provided    thoughts/feelings acknowledged    Patient slept till little bit before lunch. He was calm and cooperative with cares. Patient was withdrawn and isolative to his room throughout the shift only coming out for needs.Patient remains hyperfocussed on his disposition status and asks why he is still here. Writer acknowledged patient's frustration and provided reassurance. Patient endorsed feelings of anxiety due to the lack of certainty for his dispositions. He stated that \" I accepted this [being in the hospital], but I would like to go to CD treatment facility\" and wants to leave this place. He added that \"my health is deteriorating\" due to lack of physical inactivity. Patient was encouraged to do physical activity such as riding the exercise bike, but declined. Patient denies thoughts of harming self/others, auditory hallucinations, paranoia and racing thoughts. No evidence of delusions. No acute behavioral concern.    Patient ate 100% for both breakfast and lunch; had enough fluids. No BM this shift. Patient denies pain or any other physical discomfort. No acute medical concern. VSS /73 (BP Location: Right arm, Patient Position: Sitting, Cuff Size: Adult Regular)   Pulse 79   Temp 98  F (36.7  C) (Tympanic)   Resp 19   Ht 1.803 m (5' 11\")   Wt 89 kg (196 lb 3.2 oz)   SpO2 97%   BMI 27.36 kg/m       Note: patient's brother Khalif came to visit and took with him patient's items in patient belongings envelope that was send down to security.       "

## 2022-07-24 NOTE — PROGRESS NOTES
Patient was awake in most of the sift. Requesting several prn medication. At different times. He had Hydroxyzine, Seroquel 100 mg Tylenol for pain. Will continue to monitor patient.l

## 2022-07-24 NOTE — PLAN OF CARE
"  Problem: Plan of Care - These are the overarching goals to be used throughout the patient stay.    Goal: Plan of Care Review/Shift Note  Outcome: Ongoing, Progressing  Flowsheets (Taken 7/23/2022 2200)  Plan of Care Reviewed With: patient     Pt isolated to his room for most of the evening, coming out to make requests or use the phone. Reports his mood is anxious. Denies SI/SIB, HI, AH/VH. He requested prn hydroxyzine 25 mg for anxiety at 1810, which he states was effective. Pt reports frustration about uncertainty of length of stay, and that he has PTA medications he wants ordered here. Pt states he was supposed to take buprenorphine \"for two years,\" and that he's also prescribed Valium and Adderall. Poor insight and reported \"there's no reason I shouldn't get those medications\" when at CARE facility or treatment. He was compliant with all scheduled medications. Denies medication side effects, but states he has needed prns throughout the day for breakthrough anxiety. Pt requested prn Zyprexa 5 mg at 2150 for c/o agitation and racing thoughts.  "

## 2022-07-24 NOTE — PROGRESS NOTES
"   07/23/22 1903   Vital Signs   Temp 98  F (36.7  C)   Temp src Tympanic   Pulse 79   Pulse Rate Source Monitor   BP (!) 146/76   BP Location Left arm   Patient Position Sitting   Cuff Size Adult Regular   Sitting Orthostatic BP   Sitting Orthostatic /76   Sitting Orthostatic Pulse 79 bpm   Standing Orthostatic BP   Standing Orthostatic /78   Standing Orthostatic Pulse 88 bpm   Oxygen Therapy   SpO2 97 %   O2 Device None (Room air)   Pain/Comfort   Patient Currently in Pain yes   Pain/Comfort/Sleep   Pain Location back  (\"I need to have back surgery\")     "

## 2022-07-25 PROCEDURE — 250N000013 HC RX MED GY IP 250 OP 250 PS 637

## 2022-07-25 PROCEDURE — 250N000013 HC RX MED GY IP 250 OP 250 PS 637: Performed by: PSYCHIATRY & NEUROLOGY

## 2022-07-25 PROCEDURE — 99232 SBSQ HOSP IP/OBS MODERATE 35: CPT | Mod: GC | Performed by: PSYCHIATRY & NEUROLOGY

## 2022-07-25 PROCEDURE — 124N000002 HC R&B MH UMMC

## 2022-07-25 RX ADMIN — MELATONIN TAB 3 MG 3 MG: 3 TAB at 19:14

## 2022-07-25 RX ADMIN — PRAZOSIN HYDROCHLORIDE 1 MG: 1 CAPSULE ORAL at 19:14

## 2022-07-25 RX ADMIN — NICOTINE POLACRILEX 4 MG: 4 GUM, CHEWING BUCCAL at 22:20

## 2022-07-25 RX ADMIN — NICOTINE POLACRILEX 4 MG: 4 GUM, CHEWING BUCCAL at 12:54

## 2022-07-25 RX ADMIN — ACETAMINOPHEN 325MG 650 MG: 325 TABLET ORAL at 22:20

## 2022-07-25 RX ADMIN — OLANZAPINE 10 MG: 5 TABLET, FILM COATED ORAL at 03:11

## 2022-07-25 RX ADMIN — POLYETHYLENE GLYCOL 3350 17 G: 17 POWDER, FOR SOLUTION ORAL at 11:31

## 2022-07-25 RX ADMIN — QUETIAPINE FUMARATE 100 MG: 100 TABLET ORAL at 19:14

## 2022-07-25 RX ADMIN — QUETIAPINE FUMARATE 100 MG: 100 TABLET ORAL at 22:20

## 2022-07-25 RX ADMIN — OLANZAPINE 10 MG: 5 TABLET, FILM COATED ORAL at 13:23

## 2022-07-25 RX ADMIN — HYDROXYZINE HYDROCHLORIDE 25 MG: 25 TABLET ORAL at 01:15

## 2022-07-25 RX ADMIN — ALUMINUM HYDROXIDE, MAGNESIUM HYDROXIDE, AND SIMETHICONE 30 ML: 200; 200; 20 SUSPENSION ORAL at 11:29

## 2022-07-25 RX ADMIN — HYDROXYZINE HYDROCHLORIDE 25 MG: 25 TABLET ORAL at 18:26

## 2022-07-25 RX ADMIN — NICOTINE POLACRILEX 4 MG: 4 GUM, CHEWING BUCCAL at 18:25

## 2022-07-25 ASSESSMENT — ACTIVITIES OF DAILY LIVING (ADL)
ADLS_ACUITY_SCORE: 28
ADLS_ACUITY_SCORE: 28
DRESS: SCRUBS (BEHAVIORAL HEALTH)
ADLS_ACUITY_SCORE: 28
ADLS_ACUITY_SCORE: 28
HYGIENE/GROOMING: INDEPENDENT
ADLS_ACUITY_SCORE: 28
LAUNDRY: UNABLE TO COMPLETE
ORAL_HYGIENE: INDEPENDENT
ADLS_ACUITY_SCORE: 28

## 2022-07-25 NOTE — PLAN OF CARE
Problem: Plan of Care - These are the overarching goals to be used throughout the patient stay.    Goal: Readiness for Transition of Care  Outcome: Ongoing, Progressing   Goal Outcome Evaluation:    See reported frustration with his plan of care. He feels that he should be transferred to another hospital. He has been overall pleasant and cooperative this shift. He feels very frustrated regarding his outpatient  and feels that he is being held here longer that anticipated. Affect is blunted, mood sad, able to process feelings appropriately regarding current legal status, insight remains poor. Has been in the miliue today, interacts with staff and peers appropriately. Eating and drinking in good amounts. Denies SI/SIB/AH/VH. Denies any acute medical concerns today.

## 2022-07-25 NOTE — PLAN OF CARE
Assessment/Intervention/Current Symtoms and Care Coordination  The patient's care was discussed with the treatment team and chart notes were reviewed.      Writer called pt's CM Agatha Flores @ phone: [phone].  Left vm asking to confirm that the CARE referral was competed and sent in.  This was supposed to take place last week.      Also called [facility] at [phone] to see if they have the referral and what timeline is for CARE admission.  Spoke to Cinthya from [facility] and they do NOT have referral for him yet, therefore Agatha likely has not sent this in.     Timeframe for CARE is approximately 2 weeks from the time they review and accept him.  They need to get the referral from the pt's CM.     - spoke with See- he is feeling quite frustrated that he is still in the hospital and asking why his CM hasn't sent the information.  He is also asking to go to other facilities.  I explained he would need to speak with his CCM and doctor about this option.     Discharge Plan or Goal  CARE facility     Barriers to Discharge   Acute maría  Legal (criminal) status is unclear- [facility] CM attempting to clarify this with Karyn Browne.  Placement to CARE facility     Referral Status  Patient's [facility] CM is placing patient on the CARE wait list     Legal Status     Patient is civilly committed with a Das order through [facility]

## 2022-07-25 NOTE — PLAN OF CARE
"   07/25/22 1608   Group Therapy Session   Group Attendance attended group session   Time Session Began 1345   Time Session Ended 1415   Total Time patient participated (minutes) 25   Total # Attendees 1   Group Type psychoeducation   Group Topic Covered co-occurring illness;coping skills/lifestyle management   Group Session Detail self awareness topic group   Patient Response/Contribution cooperative with task   Patient Response Detail Pt initially had good participation as he spoke with writer about his hospitalization and goals.  While he shared he was eager for discharge and 'motivated' for treatment, pt was careful to say he doesn't think he has a problem with substances.  Pt does, however, acknowledge, when he uses substances, he ends up in residential, in the hospital, and/or on a committment.  Discussion was somewhat tangential as pt brought up lies from an ex girlfriend and frustration with [facility] .  When a female peer came into the lounge, pt suddenly stood up and left group stating he won't be out of his room \"when she's out here!\" and stormed away.  When writer checked in with him after this incident, pt replied he had issues with \"another person like that upstairs, (unsure if pt was making a racial remark, or if this is more about peers functioning level, wandering around, needing staff to monitor to not intrude in personal space), and they just let him bother me all the time, he'd just come up to me a the table and slap my drink right off of there!\"  Reminded pt that this other peer is with two staff, and has some freedom to safely walk the gibson/lounge, though he again refused to come out, \"nope, not doing it, not when she's out!\"     "

## 2022-07-25 NOTE — PROGRESS NOTES
"    ----------------------------------------------------------------------------------------------------------  [facility]  Psychiatric Progress Note  Hospital Day #9    See Mendenhall MRN# [id number]   Age: 31 year old YOB: 1990   Date of Admission: 7/14/2022     Subjective   The patient was discussed with the treatment team and chart notes were reviewed.      Identifier: See Mendenhall is a 31 year old gentleman with a past psychiatric diagnosis of  Bipolar 2 disorder, substance use disorder, depressive disorder and anxiety disorder, PTSD and ADHD.     Sleep:  2 hours (07/25/22 0657) - Sleeping on team arriving to see him in the AM as well  Prescribed Medications: Taken as prescribed  PRN Psychiatric Medications: acetaminophen, albuterol, alum & mag hydroxide-simethicone, hydrocortisone, hydrOXYzine, lidocaine 4%, nicotine, OLANZapine **OR** OLANZapine, QUEtiapine, QUEtiapine     Overnight Nursing Notes/Staff Report:  \"Pt was isolative to his room for most of the evening. He reported feeling irritated by loud peers in the milieu. When out in the lounge, he was selectively social with peers. He played chess with a male peer. Reports breakthrough anxiety symptoms, and requests prn medications throughout the day. Compliant with all scheduled medications, and denies any side effects. Appetite good. Hygiene appropriate. Pt denies any other concerns.\"    \"Patient slept poorly this night, continues to request prn medications. Patient was out for sometimes, had all he could receive for the night but would not sleep. Patient endorses no SI/HI, A/VH or SIB. No evidence of withdrawal or respiratory distress. Will continue to monitor and follow plan of care.\"    Patient interview:  See Mendenhall was seen in his room as a part of team rounds. Was initially sleeping but eventually awoke to his name and knocking. Was initially dismissive of team, stating that everything was the " "same. Later started to get increasingly upset about being in the hospital, the issues with his CM placing the referral, and general dissatisfaction with the unit. Requesting discharge, transfer to another facility, and other issues at various points throughout the conversation. Was confrontation and contrary with team, and when team tried to interject regarding updates to plan and consideration of a lower acuity care setting if CARE will take to long, spoke over the team and did not allow for continuation of conversation before telling team to \"Get out, if you're going to do something, go do it. Just get out\".    -------------------------------------------------------------------------------------------------------------------------  Suicidal ideation: denies current or recent suicidal ideation or behaviors.  Homicidal ideation: denies current or recent homicidal ideation or behaviors.  Psychotic symptoms: Patient denies AH, VH, paranoia, delusions.     Medication side effects reported: No significant side effects.  Acute medical concerns: none     ROS   ROS was negative unless noted above.     Objective   Vitals:    (No data recorded)    (No data recorded)    (No data recorded)    (No data recorded)     No data recorded.  No data recorded.    (refused vitals)    Mental Status Examination:  Oriented to:  Person/Self, Situation, Date and location  General: Initially sleeping on entering the room, awoke with repeated calling his name; initially dowsy but became awake and alert  Appearance:  appears stated age, Posture is reclined in bed, Grooming is adequate and Dressed in hospital scrubs  Behavior:  minimizing, accusatory, difficult to redirect and irritable  Eye Contact:  Intermittent  Psychomotor:  no abnormal motor symptoms appreciated; no catatonia present  Speech:  Initially normal volume, later increased volume and rate of speech as became more irritated  Language: Fluent in English with appropriate syntax and " "vocabulary.  Mood:  \"I'm pissed about being her\"  Affect:  angry, hostile and irritable  Thought Process:  coherent, linear, logical and goal directed  Thought Content: No SI/HI/AH/VH; No apparent delusions  Associations:  intact  Insight:  fair due to recognizing need to go to CD, but highly externalizing regarding circumstances  Judgment:  fair due to willingness to engage with treatment and go to CD  Attention Span: adequate for conversation  Concentration:  grossly intact  Recent and Remote Memory:  not formally assessed  Fund of Knowledge: average     Allergies     Allergies   Allergen Reactions     Other Drug Allergy (See Comments)      Fake metal        Labs & Imaging   No results found for this or any previous visit (from the past 24 hour(s)).     Assessment   Diagnostic Impression:  See Mendenhall is a 31 year old gentleman with a past psychiatric diagnosis of  Bipolar 2 disorder, substance use disorder, depressive disorder and anxiety disorder, PTSD and ADHD. He was admitted from the ER on 07/16/2022 where he was brought by the police due to concern for psychosis and SI with plan. This in the context of methamphetamine and cocaine use, unclear regarding medication non-adherence, he has significant history of substance use and psychosocial stressors including housing instability, unemployment, legal issues, trauma and chronic mental health issues.     He was brought to the ED with SI, erratic and impulsive behaviors and psychosis (paranoid delusions and disorganized behavior/thought process) in the context of methamphetamine and cocaine use.  His last psychiatric hospitalization was in April/2022 at [facility] for psychosis in context of substance use.  He is currently followed by Erinn Frazier at [facility]. Current psychosocial stressors include legal issues, trauma, chronic mental health issues, family dynamics and medical issues which he has been coping with by using " substances, acting out to self and acting out to others.  Patient's support system includes sister Samaria and friend Wilbert.      Substance use does appear to be playing a contributing role in the patient's presentation. Medical history does not appear to be significant, although chart review indicates history of chronic back pain, prescribed anabolic steroids that may be playing a role in presentation. In utero exposure and developmental delay need to be assessed.       Psychosocially history of trauma in childhood, long history of substance use, currently being unemployed and experiencing housing insecurity, legal issues, not being able to see his daughter represent both precipitating and perpetuating factors contributing to presentation.      The MSE is notable for some persisting paranoia, mood lability, being guarded, tangentiality and limited attention span/concentration, hyper-talkativeness difficult to interrupt speech. He denies self injurious behaviors. His reported symptoms of VH, paranoid delusions and grossly disorganized thought process in the context of methamphetamine and cocaine use are suggestive of substance-induced psychosis, although definitive diagnosis is still in evolution and further collateral information from family/ outpatient provider is needed at this time; differential includes primary psychotic disorder exacerbated by substance use, PTSD, Bipolar Disorder, schizoaffective disorder.  Additionally, he has traits of impulsive behaviors which interfere with his ability to adhere with the treatment plan.       Optimization of medications to target these symptom clusters in addition to evaluation of adequate outpatient supports will be targets for treatment during this admission.      Pt has history of previous attempts and substance use representing heightened acute risk for self/others patient warrants inpatient psychiatric hospitalization to maintain his safety until door to door treatment  can be arranged. Disposition pending clinical stabilization, medication optimization and development of an appropriate discharge plan, including CD treatment referral upon discharge.     Principal Diagnoses:   Psychosis, unspecified (substance induced vs primary psychotic illness)  Amphetamine Use Disorder, severe  Cocaine Use Disorder, severe  Sedative Hypnotic Use Disorder, severe  Opiate Use Disorder, unspecified severity  Major depressive disorder, recurrent episode, moderate   Generalized anxiety disorder    Psychiatric course:  See Mendenhall was admitted to [facility] on a 72 hour hold, transferred to [facility] following a conflict with another patient. His PTA prazosin was continued.      See Mendenhall continued to meet criteria for inpatient hospitalization medication optimization, inpatient stabilization, and appropriate discharge planning.      7/19: added Seroquel 50 mg BEDTIME; 25 mg TID PRN   7/20: Quetiapine (Seroquel) increased to 100 mg, BEDTIME scheduled. Keep 25 mg TID PRN  7/20: melatonin 3mg scheduled per patient request  7/25: Pending confirmation of CARE referral and status on list with outpatient CM. If anticipated to be a prolonged wait for placement, will consider less restrictive options for CD.    Medical course:   See Mendenhall was physically examined by the ED prior to being transferred to the unit and was found to be medically stable and appropriate for admission.      Plan   Today's Changes:     Pending confirmation of CARE referral by CM  _______________________________________________________________________  Psychiatric Management:    Prazosin 1mg at bedtime    Quetiapine 100mg at bedtime    Civil commitment with Das    Additional Planning:    Continue to monitor for and stabilize: irritable, psychosis and substance use    Patient will be treated in therapeutic milieu with appropriate individual and group therapies as described.    Scheduled Medications  (summary):  Current Facility-Administered Medications   Medication Dose Route Frequency     melatonin  3 mg Oral At Bedtime     polyethylene glycol  17 g Oral Daily     prazosin  1 mg Oral At Bedtime     QUEtiapine  100 mg Oral At Bedtime     PRN Medications (summary):  Current Facility-Administered Medications   Medication Dose Route Frequency     acetaminophen  650 mg Oral Q4H PRN     albuterol  2 puff Inhalation Q6H PRN     alum & mag hydroxide-simethicone  30 mL Oral Q4H PRN     hydrocortisone   Topical BID PRN     hydrOXYzine  25 mg Oral Q4H PRN     lidocaine 4%   Topical BID PRN     nicotine  4 mg Buccal Q1H PRN     OLANZapine  5-10 mg Oral TID PRN    Or     OLANZapine  10 mg Intramuscular TID PRN     QUEtiapine  100 mg Oral At Bedtime PRN     QUEtiapine  25-50 mg Oral TID PRN     Legal Status:    Das    Committed     SIO:    No    Pt has not required locked seclusion or restraints in the past 24 hours to maintain safety, please refer to RN documentation for further details.    Safety Assessment:   Behavioral Orders   Procedures     Assault precautions     Code 1 - Restrict to Unit     Routine Programming     As clinically indicated     Status 15     Every 15 minutes.     Disposition:    Reason for ongoing admission: Civil commitment pending placement for CD for direct transfer    Discharge location: D, pending clinical stabilization, medication optimization, and formulation of a safe discharge plan.    Discharge Medications: not ordered    Follow-up Appointments: not scheduled  ____________________________________________________________________  Pertinent Medical diagnoses and management:    None  ____________________________________________________________________  Patient seen and discussed with attending physician, Dr. Licha Ferguson, who is in agreement with my assessment and plan.    Tanner Puga MD  PGY-2 Psychiatry Resident

## 2022-07-25 NOTE — PLAN OF CARE
Problem: Behavioral Health Plan of Care  Goal: Plan of Care Review  Outcome: Ongoing, Progressing  Flowsheets (Taken 7/24/2022 2013)  Plan of Care Reviewed With: patient  Patient Agreement with Plan of Care: disagrees (describe)     Pt was isolative to his room for most of the evening. He reported feeling irritated by loud peers in the milieu. When out in the lounge, he was selectively social with peers. He played chess with a male peer. Reports breakthrough anxiety symptoms, and requests prn medications throughout the day. Compliant with all scheduled medications, and denies any side effects. Appetite good. Hygiene appropriate. Pt denies any other concerns.

## 2022-07-25 NOTE — PLAN OF CARE
Problem: Sleep Disturbance  Goal: Adequate Sleep/Rest  Outcome: Ongoing, Not Progressing  Note: Patient has been awake, requesting different prn medications, yet to sleep at this time (0140)     Problem: Behavioral Health Plan of Care  Goal: Adheres to Safety Considerations for Self and Others  Outcome: Ongoing, Progressing  Note: Patient adheres to the safety consideration for self and for others.     Problem: Suicidal Behavior  Goal: Suicidal Behavior is Absent or Managed  Outcome: Ongoing, Progressing  Note: Patient denies suicidal /homicidal ideation, plan or intent   Goal Outcome Evaluation:  Patient slept poorly this night, continues to request prn medications. Patient was out for sometimes, had all he could receive for the night but would not sleep. Patient endorses no SI/HI, A/VH or SIB. No evidence of withdrawal or respiratory distress. Will continue to monitor and follow plan of care.            Alvin Tracy DNP, RN, APRN, CNS, AGCNS-BC.

## 2022-07-26 PROCEDURE — 99232 SBSQ HOSP IP/OBS MODERATE 35: CPT | Mod: GC | Performed by: PSYCHIATRY & NEUROLOGY

## 2022-07-26 PROCEDURE — 250N000013 HC RX MED GY IP 250 OP 250 PS 637: Performed by: PSYCHIATRY & NEUROLOGY

## 2022-07-26 PROCEDURE — 250N000013 HC RX MED GY IP 250 OP 250 PS 637

## 2022-07-26 PROCEDURE — 124N000002 HC R&B MH UMMC

## 2022-07-26 RX ADMIN — QUETIAPINE FUMARATE 100 MG: 100 TABLET ORAL at 19:16

## 2022-07-26 RX ADMIN — NICOTINE POLACRILEX 4 MG: 4 GUM, CHEWING BUCCAL at 14:59

## 2022-07-26 RX ADMIN — NICOTINE POLACRILEX 4 MG: 4 GUM, CHEWING BUCCAL at 16:37

## 2022-07-26 RX ADMIN — NICOTINE POLACRILEX 4 MG: 4 GUM, CHEWING BUCCAL at 09:37

## 2022-07-26 RX ADMIN — POLYETHYLENE GLYCOL 3350 17 G: 17 POWDER, FOR SOLUTION ORAL at 09:36

## 2022-07-26 RX ADMIN — ACETAMINOPHEN 325MG 650 MG: 325 TABLET ORAL at 11:44

## 2022-07-26 RX ADMIN — PRAZOSIN HYDROCHLORIDE 1 MG: 1 CAPSULE ORAL at 19:16

## 2022-07-26 RX ADMIN — NICOTINE POLACRILEX 4 MG: 4 GUM, CHEWING BUCCAL at 19:31

## 2022-07-26 RX ADMIN — HYDROXYZINE HYDROCHLORIDE 25 MG: 25 TABLET ORAL at 20:44

## 2022-07-26 RX ADMIN — QUETIAPINE FUMARATE 100 MG: 100 TABLET ORAL at 22:19

## 2022-07-26 RX ADMIN — NICOTINE POLACRILEX 4 MG: 4 GUM, CHEWING BUCCAL at 18:30

## 2022-07-26 RX ADMIN — NICOTINE POLACRILEX 4 MG: 4 GUM, CHEWING BUCCAL at 13:38

## 2022-07-26 RX ADMIN — HYDROXYZINE HYDROCHLORIDE 25 MG: 25 TABLET ORAL at 00:43

## 2022-07-26 RX ADMIN — ACETAMINOPHEN 325MG 650 MG: 325 TABLET ORAL at 19:12

## 2022-07-26 RX ADMIN — NICOTINE POLACRILEX 4 MG: 4 GUM, CHEWING BUCCAL at 11:44

## 2022-07-26 RX ADMIN — NICOTINE POLACRILEX 4 MG: 4 GUM, CHEWING BUCCAL at 00:44

## 2022-07-26 RX ADMIN — QUETIAPINE FUMARATE 25 MG: 25 TABLET ORAL at 02:03

## 2022-07-26 RX ADMIN — OLANZAPINE 10 MG: 5 TABLET, FILM COATED ORAL at 13:38

## 2022-07-26 RX ADMIN — MELATONIN TAB 3 MG 3 MG: 3 TAB at 19:16

## 2022-07-26 RX ADMIN — HYDROXYZINE HYDROCHLORIDE 25 MG: 25 TABLET ORAL at 14:59

## 2022-07-26 ASSESSMENT — ACTIVITIES OF DAILY LIVING (ADL)
ADLS_ACUITY_SCORE: 28
DRESS: SCRUBS (BEHAVIORAL HEALTH)
ADLS_ACUITY_SCORE: 28
ORAL_HYGIENE: INDEPENDENT
LAUNDRY: UNABLE TO COMPLETE
ADLS_ACUITY_SCORE: 28
HYGIENE/GROOMING: HANDWASHING;SHOWER;INDEPENDENT

## 2022-07-26 NOTE — PROGRESS NOTES
"    ----------------------------------------------------------------------------------------------------------  [facility]  Psychiatric Progress Note  Hospital Day #10    See Mendenhall MRN# [id number]   Age: 31 year old YOB: 1990   Date of Admission: 7/14/2022     Subjective   The patient was discussed with the treatment team and chart notes were reviewed.      Identifier: See Mendenhall is a 31 year old gentleman with a past psychiatric diagnosis of  Bipolar 2 disorder, substance use disorder, depressive disorder and anxiety disorder, PTSD and ADHD.     Sleep:  3 hours (07/26/22 0629) - Sleeping on team arriving to see him in the AM as well  Prescribed Medications: Taken as prescribed  PRN Psychiatric Medications: acetaminophen, albuterol, alum & mag hydroxide-simethicone, hydrocortisone, hydrOXYzine, lidocaine 4%, nicotine, OLANZapine **OR** OLANZapine, QUEtiapine, QUEtiapine     Overnight Nursing Notes/Staff Report:  \"Pt was active in the milieu throughout the evening, though he stated he didn't feel comfortable being in the lounge when specific peers were present. He endorses ongoing anxiety, stating that prn medications have been somewhat helpful. Denies SI/SIB, HI, AH/VH. States he feels frustrated and disappointed about length of stay and placement options. He requested the phone number for [facility]. \"I'm not a danger to myself so I shouldn't have to be here.\" Poor insight into his symptoms and treatment.   Compliant with scheduled medications and denies any side effects. Will continue to monitor closely and encourage participation in therapeutic programming.\"    \"Patient was up until about 0215, continues to request prn medications inappropriately sometimes within 1.5 hours of receiving hydroxyzine and tylenol and Seroquel. Patient seems confused but when informed he just had the medications, he would try asking for another. Sleep was poor if any. Had some " "food as well. Patient endorses no SI/HI, A/VH or SIB. Patient was not observed responding to internal stimuli. Patient demonstrates no verbal or physical aggression toward staff or peers. Overall, patient achieves about 3 hours of sleep. Will continue to monitor and follow plan of care.\"    Patient interview:  See Mendenhall was seen in his room as a part of team rounds. Reports that he is \"the same\" today given the situation with his [facility] not having submitted the referral and not having any contact from her for a prolonged period which is causing understandable distress. More calm and open to talking with team about what alternative considerations are and what the team is able to do to work on getting him to CD. Reaffirms that he wants to go to CD at this time and thinks it's the best option for him. When asked about prior CD experience, states that he has been to 3 previously and had completed a full 6 month program at [facility]. For his last CD placement, states that he thought that there was a warrant out for his arrest and that it meant he would be picked up from the CD facility, leading to him going to [facility], which he did not want. For that reason states he left the CD facility and ended up using again after leaving. At this time affirms that he definitely wants to attend a CD program and has no intention of leaving early.    -------------------------------------------------------------------------------------------------------------------------  Suicidal ideation: denies current or recent suicidal ideation or behaviors.  Homicidal ideation: denies current or recent homicidal ideation or behaviors.  Psychotic symptoms: Patient denies AH, VH, paranoia, delusions.     Medication side effects reported: No significant side effects.  Acute medical concerns: none     ROS   ROS was negative unless noted above.     Objective   Vitals:  Temp: 98.6  F (37  C) (Temp  Min: 98.6  F (37  C)  Max: 98.6  F (37  C))    (No " "data recorded)  SpO2: 99 % (SpO2  Min: 99 %  Max: 99 %)  Pulse: 98 (Pulse  Min: 98  Max: 98)  BP: 127/85  Systolic (24hrs), Av , Min:127 , Max:127   Diastolic (24hrs), Av, Min:85, Max:85    (refused vitals)    Mental Status Examination:  Oriented to:  Person/Self, Situation, Date and location  General: Awake and Alert  Appearance:  appears stated age, Posture is reclined in bed, Grooming is adequate and Dressed in hospital scrubs  Behavior:  cooperative, engaged and at times difficult to redirect, but more easy to do so today as compared to yesterday  Eye Contact:  Intermittent  Psychomotor:  no abnormal motor symptoms appreciated; no catatonia present  Speech:  appropriate volume/tone and with good articulation  Language: Fluent in English with appropriate syntax and vocabulary.  Mood:  \"I'm the same\"  Affect:  congruent with mood and irritable  Thought Process:  coherent, linear, logical and goal directed  Thought Content: No SI/HI/AH/VH; No apparent delusions  Associations:  intact  Insight:  fair due to recognizing need to go to CD, but highly externalizing regarding circumstances  Judgment:  fair due to willingness to engage with treatment and go to CD  Attention Span: adequate for conversation  Concentration:  grossly intact  Recent and Remote Memory:  not formally assessed  Fund of Knowledge: average     Allergies     Allergies   Allergen Reactions     Other Drug Allergy (See Comments)      Fake metal        Labs & Imaging   No results found for this or any previous visit (from the past 24 hour(s)).     Assessment   Diagnostic Impression:  See Mendenhall is a 31 year old gentleman with a past psychiatric diagnosis of  Bipolar 2 disorder, substance use disorder, depressive disorder and anxiety disorder, PTSD and ADHD. He was admitted from the ER on 2022 where he was brought by the police due to concern for psychosis and SI with plan. This in the context of methamphetamine and cocaine use, " unclear regarding medication non-adherence, he has significant history of substance use and psychosocial stressors including housing instability, unemployment, legal issues, trauma and chronic mental health issues.     He was brought to the ED with SI, erratic and impulsive behaviors and psychosis (paranoid delusions and disorganized behavior/thought process) in the context of methamphetamine and cocaine use.  His last psychiatric hospitalization was in April/2022 at [facility] for psychosis in context of substance use.  He is currently followed by PCP Erinn Maradiaga at [facility]. Current psychosocial stressors include legal issues, trauma, chronic mental health issues, family dynamics and medical issues which he has been coping with by using substances, acting out to self and acting out to others.  Patient's support system includes sister Samaria and friend Wilbert.      Substance use does appear to be playing a contributing role in the patient's presentation. Medical history does not appear to be significant, although chart review indicates history of chronic back pain, prescribed anabolic steroids that may be playing a role in presentation. In utero exposure and developmental delay need to be assessed.       Psychosocially history of trauma in childhood, long history of substance use, currently being unemployed and experiencing housing insecurity, legal issues, not being able to see his daughter represent both precipitating and perpetuating factors contributing to presentation.      The MSE is notable for some persisting paranoia, mood lability, being guarded, tangentiality and limited attention span/concentration, hyper-talkativeness difficult to interrupt speech. He denies self injurious behaviors. His reported symptoms of VH, paranoid delusions and grossly disorganized thought process in the context of methamphetamine and cocaine use are suggestive of substance-induced psychosis,  although definitive diagnosis is still in evolution and further collateral information from family/ outpatient provider is needed at this time; differential includes primary psychotic disorder exacerbated by substance use, PTSD, Bipolar Disorder, schizoaffective disorder.  Additionally, he has traits of impulsive behaviors which interfere with his ability to adhere with the treatment plan.       Optimization of medications to target these symptom clusters in addition to evaluation of adequate outpatient supports will be targets for treatment during this admission.      Pt has history of previous attempts and substance use representing heightened acute risk for self/others patient warrants inpatient psychiatric hospitalization to maintain his safety until door to door treatment can be arranged. Disposition pending clinical stabilization, medication optimization and development of an appropriate discharge plan, including CD treatment referral upon discharge.     Principal Diagnoses:   Psychosis, unspecified (substance induced vs primary psychotic illness)  Amphetamine Use Disorder, severe  Cocaine Use Disorder, severe  Sedative Hypnotic Use Disorder, severe  Opiate Use Disorder, unspecified severity  Major depressive disorder, recurrent episode, moderate   Generalized anxiety disorder    Psychiatric course:  See Mendenhall was admitted to [facility] on a 72 hour hold, transferred to [facility] 12 following a conflict with another patient. His PTA prazosin was continued.      See Mendenhall continued to meet criteria for inpatient hospitalization medication optimization, inpatient stabilization, and appropriate discharge planning.      7/19: added Seroquel 50 mg BEDTIME; 25 mg TID PRN   7/20: Quetiapine (Seroquel) increased to 100 mg, BEDTIME scheduled. Keep 25 mg TID PRN  7/20: melatonin 3mg scheduled per patient request  7/25: Pending confirmation of CARE referral and status on list with outpatient CM. If  anticipated to be a prolonged wait for placement, will consider less restrictive options for CD.  7/26: Ongoing issues with outpatient CM Agatha not returning messages or answering calls from team or patient. Message left with CM supervisor given persistent issues reaching CM.    Medical course:   See Mendenhall was physically examined by the ED prior to being transferred to the unit and was found to be medically stable and appropriate for admission.      Plan   Today's Changes:     Pending confirmation of CARE referral by CM  _______________________________________________________________________  Psychiatric Management:    Prazosin 1mg at bedtime    Quetiapine 100mg at bedtime    Civil commitment with Das    Additional Planning:    Continue to monitor for and stabilize: irritable, psychosis and substance use    Patient will be treated in therapeutic milieu with appropriate individual and group therapies as described.    Scheduled Medications (summary):  Current Facility-Administered Medications   Medication Dose Route Frequency     melatonin  3 mg Oral At Bedtime     polyethylene glycol  17 g Oral Daily     prazosin  1 mg Oral At Bedtime     QUEtiapine  100 mg Oral At Bedtime     PRN Medications (summary):  Current Facility-Administered Medications   Medication Dose Route Frequency     acetaminophen  650 mg Oral Q4H PRN     albuterol  2 puff Inhalation Q6H PRN     alum & mag hydroxide-simethicone  30 mL Oral Q4H PRN     hydrocortisone   Topical BID PRN     hydrOXYzine  25 mg Oral Q4H PRN     lidocaine 4%   Topical BID PRN     nicotine  4 mg Buccal Q1H PRN     OLANZapine  5-10 mg Oral TID PRN    Or     OLANZapine  10 mg Intramuscular TID PRN     QUEtiapine  100 mg Oral At Bedtime PRN     QUEtiapine  25-50 mg Oral TID PRN     Legal Status:    Das    Committed     SIO:    No    Pt has not required locked seclusion or restraints in the past 24 hours to maintain safety, please refer to RN documentation for  further details.    Safety Assessment:   Behavioral Orders   Procedures     Assault precautions     Code 1 - Restrict to Unit     Routine Programming     As clinically indicated     Status 15     Every 15 minutes.     Disposition:    Reason for ongoing admission: Civil commitment pending placement for CD for direct transfer    Discharge location: D, pending clinical stabilization, medication optimization, and formulation of a safe discharge plan.    Discharge Medications: not ordered    Follow-up Appointments: not scheduled  ____________________________________________________________________  Pertinent Medical diagnoses and management:    None  ____________________________________________________________________  Patient seen and discussed with attending physician, Dr. Licha Ferguson, who is in agreement with my assessment and plan.    Tanner Puga MD  PGY-2 Psychiatry Resident

## 2022-07-26 NOTE — PLAN OF CARE
"   07/26/22 1222   Group Therapy Session   Group Attendance attended group session   Time Session Began 1015   Time Session Ended 1100   Total Time patient participated (minutes) 45   Total # Attendees 1   Group Type Occupational Therapy   Group Topic Covered balanced lifestyle;leisure exploration/use of leisure time;relaxation techniques;problem-solving   Group Session Detail OT Leisure Group   Patient Response Detail Intervention: Leisure Group with 0 peers.    Patient Response: Pt participated in a card game with writer for leisure participation and exploration, problem solving and socialization. Pt actively participated for the duration of the group, initiating shuffling cards and starting a new game, playing 3 total with writer. While playing game pt talked about their difficulties with substance abuse in the past, stating \"I know I messed up my life with drugs and need to get it back on track. I am just ready to get out of here and go to treatment and takes steps forward. I need to be doing things like going to school to be an \". Pt noted disliking being on the unit, missing being outside and feeling frustrated with other patients being \"boisterous and loud for no reason. I don't know if its to make a scene or what\". Pt otherwise made polite conversation with writer, talked briefly about their daughter turning 7 recently and other small talk subjects\".      Mood/Affect: Irritable/negative at times, overall pleasant      Plan: Patient encouraged to maintain attendance for continued ongoing support in working towards occupational therapy goals to support overall treatment/care.          "

## 2022-07-26 NOTE — PLAN OF CARE
Problem: Plan of Care - These are the overarching goals to be used throughout the patient stay.    Goal: Plan of Care Review/Shift Note  Description: The Plan of Care Review/Shift note should be completed every shift.  The Outcome Evaluation is a brief statement about your assessment that the patient is improving, declining, or no change.  This information will be displayed automatically on your shift note.  Outcome: Ongoing, Progressing  Flowsheets  Taken 7/26/2022 1847  Plan of Care Reviewed With: patient  Overall Patient Progress: improving  Taken 7/26/2022 1700  Plan of Care Reviewed With: patient   Goal Outcome Evaluation:    Plan of Care Reviewed With: patient     Overall Patient Progress: improving       Patient in the milieu throughout the day attending groups, social and interacting with staff and peers. Patient upon approach full range in affect, calm and cooperative with verbal assessment. Patient reporting frustration with length of stay and inability to make contact with outpatient  which he believes is delaying his discharge. Patient continues to acceptance with care plan and discharge plan. Patient denies SI/SIB, AH/VH, depression or thoughts of harming others. Patient did identify throughout the day anxiety 7/10 and agitation related to delay in placement. Patient requested and received PRN Zyprexa, and later PRN Hydroxyzine which he reported was therapeutic. Patient diet appeared good eating 100% of meals. Patient independent with identifying needs and completing ADL's.

## 2022-07-26 NOTE — PLAN OF CARE
"  Problem: Behavioral Health Plan of Care  Goal: Optimal Comfort and Wellbeing  Outcome: Ongoing, Not Progressing    Pt was active in the milieu throughout the evening, though he stated he didn't feel comfortable being in the lounge when specific peers were present. He endorses ongoing anxiety, stating that prn medications have been somewhat helpful. Denies SI/SIB, HI, AH/VH. States he feels frustrated and disappointed about length of stay and placement options. He requested the phone number for [facility]. \"I'm not a danger to myself so I shouldn't have to be here.\" Poor insight into his symptoms and treatment.   Compliant with scheduled medications and denies any side effects. Will continue to monitor closely and encourage participation in therapeutic programming.   "

## 2022-07-26 NOTE — PLAN OF CARE
Problem: Sleep Disturbance  Goal: Adequate Sleep/Rest  Outcome: Ongoing, Not Progressing  Note: Patient was essentially up for the most part of the night despite receiving various prn medications     Problem: Plan of Care - These are the overarching goals to be used throughout the patient stay.    Goal: Absence of Hospital-Acquired Illness or Injury  Outcome: Ongoing, Progressing  Note: No evidence of hospital acquired illness or injury     Problem: Suicidal Behavior  Goal: Suicidal Behavior is Absent or Managed  Outcome: Ongoing, Progressing  Note: Patient did not report or endorse suicidal ideation, plan or intent this night.     Problem: Plan of Care - These are the overarching goals to be used throughout the patient stay.    Goal: Absence of Hospital-Acquired Illness or Injury  Outcome: Ongoing, Progressing  Note: No evidence of hospital acquired illness or injury     Problem: Suicidal Behavior  Goal: Suicidal Behavior is Absent or Managed  Outcome: Ongoing, Progressing  Note: Patient did not report or endorse suicidal ideation, plan or intent this night.   Goal Outcome Evaluation:        Patient was up until about 0215, continues to request prn medications inappropriately sometimes within 1.5 hours of receiving hydroxyzine and tylenol and Seroquel. Patient seems confused but when informed he just had the medications, he would try asking for another. Sleep was poor if any. Had some food as well. Patient endorses no SI/HI, A/VH or SIB. Patient was not observed responding to internal stimuli. Patient demonstrates no verbal or physical aggression toward staff or peers. Overall, patient achieves about 3 hours of sleep. Will continue to monitor and follow plan of care.          Alvin Tracy, DNP, RN, APRN, CNS, AGCNS-BC

## 2022-07-26 NOTE — PLAN OF CARE
Assessment/Intervention/Current Symtoms and Care Coordination  The patient's care was discussed with the treatment team and chart notes were reviewed.    Writer called and spoke to Agatha's supervisor at [facility] because we still had no phone calls back from her.  Milly Khan is her supervisor - Phone: [phone].  Agatha did finally call us back after her supervisor texted her.   She said that she was told the CARE referral was made by someone up on the othe unit the pt had been on.  Which is not true or accurate according to all [facility] who have worked with the pt.  She said she will call [facility] to make this referral.  There is also discussion about him going to a regular MI/CD tx facility, but they are not 100% sure this is appropriate for him.  She is going to check with CARE for waittime (maybe 2 weeks they told me) and we will go from there on the referrals.  pt s CCM Agatha Mark - phone: [phone]    Discharge Plan or Goal  CARE facility     Barriers to Discharge   Acute maría  Legal (criminal) status is unclear- [facility] attempting to clarify this with [facility].  Placement to CARE facility     Referral Status  Patient's [facility] CM is placing patient on the CARE wait list     Legal Status     Patient is civilly committed with a Das order through [facility]

## 2022-07-27 PROCEDURE — 250N000013 HC RX MED GY IP 250 OP 250 PS 637: Performed by: PSYCHIATRY & NEUROLOGY

## 2022-07-27 PROCEDURE — 124N000002 HC R&B MH UMMC

## 2022-07-27 PROCEDURE — 250N000013 HC RX MED GY IP 250 OP 250 PS 637

## 2022-07-27 PROCEDURE — 99231 SBSQ HOSP IP/OBS SF/LOW 25: CPT | Performed by: PSYCHIATRY & NEUROLOGY

## 2022-07-27 RX ADMIN — QUETIAPINE FUMARATE 100 MG: 100 TABLET ORAL at 19:57

## 2022-07-27 RX ADMIN — ACETAMINOPHEN 325MG 650 MG: 325 TABLET ORAL at 08:40

## 2022-07-27 RX ADMIN — NICOTINE POLACRILEX 4 MG: 4 GUM, CHEWING BUCCAL at 19:57

## 2022-07-27 RX ADMIN — NICOTINE POLACRILEX 4 MG: 4 GUM, CHEWING BUCCAL at 15:46

## 2022-07-27 RX ADMIN — NICOTINE POLACRILEX 4 MG: 4 GUM, CHEWING BUCCAL at 13:47

## 2022-07-27 RX ADMIN — NICOTINE POLACRILEX 4 MG: 4 GUM, CHEWING BUCCAL at 08:41

## 2022-07-27 RX ADMIN — OLANZAPINE 10 MG: 5 TABLET, FILM COATED ORAL at 08:05

## 2022-07-27 RX ADMIN — QUETIAPINE FUMARATE 50 MG: 25 TABLET ORAL at 17:40

## 2022-07-27 RX ADMIN — ACETAMINOPHEN 325MG 650 MG: 325 TABLET ORAL at 19:57

## 2022-07-27 RX ADMIN — MELATONIN TAB 3 MG 3 MG: 3 TAB at 19:58

## 2022-07-27 RX ADMIN — NICOTINE POLACRILEX 4 MG: 4 GUM, CHEWING BUCCAL at 12:29

## 2022-07-27 RX ADMIN — ACETAMINOPHEN 325MG 650 MG: 325 TABLET ORAL at 15:47

## 2022-07-27 RX ADMIN — PRAZOSIN HYDROCHLORIDE 1 MG: 1 CAPSULE ORAL at 19:57

## 2022-07-27 RX ADMIN — POLYETHYLENE GLYCOL 3350 17 G: 17 POWDER, FOR SOLUTION ORAL at 08:05

## 2022-07-27 RX ADMIN — HYDROXYZINE HYDROCHLORIDE 25 MG: 25 TABLET ORAL at 15:46

## 2022-07-27 RX ADMIN — OLANZAPINE 10 MG: 5 TABLET, FILM COATED ORAL at 13:46

## 2022-07-27 ASSESSMENT — ACTIVITIES OF DAILY LIVING (ADL)
LAUNDRY: WITH SUPERVISION
HYGIENE/GROOMING: HANDWASHING;INDEPENDENT
ADLS_ACUITY_SCORE: 28
LAUNDRY: UNABLE TO COMPLETE
ADLS_ACUITY_SCORE: 28
DRESS: INDEPENDENT;SCRUBS (BEHAVIORAL HEALTH)
ADLS_ACUITY_SCORE: 28
ADLS_ACUITY_SCORE: 28
DRESS: SCRUBS (BEHAVIORAL HEALTH)
ADLS_ACUITY_SCORE: 28
LAUNDRY: UNABLE TO COMPLETE
ADLS_ACUITY_SCORE: 28
DRESS: SCRUBS (BEHAVIORAL HEALTH)
ADLS_ACUITY_SCORE: 28
ORAL_HYGIENE: INDEPENDENT
ORAL_HYGIENE: INDEPENDENT
HYGIENE/GROOMING: INDEPENDENT
HYGIENE/GROOMING: INDEPENDENT
ADLS_ACUITY_SCORE: 28
ADLS_ACUITY_SCORE: 28
ORAL_HYGIENE: INDEPENDENT
ADLS_ACUITY_SCORE: 28
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
## 2022-07-27 NOTE — PLAN OF CARE
Pt transferred from station 12 this evening. Pt is calm and cooperative. Pt presents with flat affect. Pt is was pacing the halls listening to headphones. Pt was out in the dinning area writing. Pt endorses anxiety 6/10 and prn Seroquel was administered per pt request. Pt denied SI/SIB/AH/VH. Pt reported pain to right arm. Pt stated is due to torn right biceps and requesting for tramadol. On-call resident updated on pt's request. Prn Tylenol was administered per pt request. Pt was compliant with medication at HS. Will monitor.    Problem: Behavioral Health Plan of Care  Goal: Adheres to Safety Considerations for Self and Others  Outcome: Ongoing, Progressing     Problem: Suicidal Behavior  Goal: Suicidal Behavior is Absent or Managed  Outcome: Ongoing, Progressing   Goal Outcome Evaluation:

## 2022-07-27 NOTE — PLAN OF CARE
"Problem: Behavioral Health Plan of Care  Goal: Develops/Participates in Therapeutic [place] to Support Successful Transition  Outcome: Ongoing, Progressing     Patient is alert and oriented x3, calm and cooperative. Patient was withdrawn and isolative to his room during the entire evening shift only coming for needs/requests. Patient fair affect that brightens upon approach and calm mood. Patient wasn't perseverating on disposition as previously and he didn't talk about or express his angry about placement issue.  Patient appears neat and well-groomed. Patient endorses depression 5/10 and anxiety 5/10. He requested hydroxyzine 25 mg  with some relief reported. Patient denied having  SI/SIB/HI, racing thoughts,hallucinations, and thoughts of paranoia. Patient is eating, hydrating, and sleeping adequately.     Patient is medication compliant. Medication side effects were not observed or reported this shift. Patient endorsed pain 6/10 on his shoulder and arm and accepted Tylenol 650 mg prn. Patient requested Tramadol initially and was not available. Patient requested and received Seroquel 100 mg prior to bed.      VSS /76 (BP Location: Right arm, Patient Position: Sitting)   Pulse 97   Temp 98.6  F (37  C) (Tympanic)   Resp 19   Ht 1.803 m (5' 11\")   Wt 89 kg (196 lb 3.2 oz)   SpO2 96%   BMI 27.36 kg/m         "

## 2022-07-27 NOTE — PLAN OF CARE
Problem: Plan of Care - These are the overarching goals to be used throughout the patient stay.    Goal: Plan of Care Review/Shift Note  Description: The Plan of Care Review/Shift note should be completed every shift.  The Outcome Evaluation is a brief statement about your assessment that the patient is improving, declining, or no change.  This information will be displayed automatically on your shift note.  Outcome: Ongoing, Not Progressing  Flowsheets  Taken 7/27/2022 1636  Plan of Care Reviewed With: patient  Overall Patient Progress: no change  Taken 7/27/2022 1600  Plan of Care Reviewed With: patient     Problem: Plan of Care - These are the overarching goals to be used throughout the patient stay.    Goal: Optimal Comfort and Wellbeing  Intervention: Monitor Pain and Promote Comfort  Recent Flowsheet Documentation  Taken 7/27/2022 1547 by Nestor Campuzano RN  Pain Management Interventions: medication (see MAR)   Goal Outcome Evaluation:    Plan of Care Reviewed With: patient     Overall Patient Progress: no change     Transfer orders placed by Dr. Ferguson and patient notified of plan to transfer to [facility]. Patient accepting of plans to transfer with no concerns identified. Patient reports continued anxiety and frustration due to delay in placement and inability to contact outpatient  Agatha. Patient denies SI/SIB, AH/VH, anxiety, depression, or thoughts of harming others. Patient requested and received PRN Hydroxyzine for anxiety and Tylenol for right bicep pain 9/10 related to past injury. Report given to [facility] STEPHANIE Lundberg.

## 2022-07-27 NOTE — PLAN OF CARE
"  Problem: Behavioral Health Plan of Care  Goal: Optimal Comfort and Wellbeing  Outcome: Ongoing, Not Progressing    Pt was irritable on approach this morning. He c/o agitation and requested prn Zyprexa (which is ordered 5-10 mg tid prn). When writer asked which dose he was requesting, pt responded \"how come you're the first person to ask me that? This is ridiculous, what's wrong with this place?\" Pt requested 10 mg of Zyprexa, which he received at 0805. He reported mild relief; states he is frustrated that he doesn't have access to medications prescribed outside the hospital, including Tramadol, Valium, Adderall, and Suboxone. Poor insight into situation and need for treatment. He endorses anxiety, and denies all other mental health symptoms. He has been compliant with scheduled medications.   He is requesting access to personal clothing, and voiced frustration about loud peers in the milieu. He discussed transferring to a less acute unit with the treatment team.    "

## 2022-07-27 NOTE — PLAN OF CARE
07/27/22 4469   Group Therapy Session   Group Type addiction;life skill;psychoeducation   Group Topic Covered anger/conflict management;community integration;co-occurring illness;relapse prevention;disease of addiction/choices in recovery   Group Session Detail Since it was just See and I in group we discussed his situation directly. He is struggling to accept that he is having to go to a locked CARE facility. He is angry and upset. We worked on coping skills, anger mgmt and mindfulness activities   Patient Response/Contribution expressed understanding of topic;discussed personal experience with topic;cooperative with task   Patient Response Detail Pt was irritable and voiced his frustration during the group. He was only participant therefore we discussed his feelings around his situation.

## 2022-07-27 NOTE — PLAN OF CARE
Pt asleep  at start of shift.     Breathing quiet and unlabored when sleeping.     Pt had no c/o pain or discomfort during the HS.     Appears to have slept 6.5 hours.     Pt on SUICIDE precautions in addition to single room order. Any related events noted above.     Will continue to monitor and assess.     Goal Outcome Evaluation:    Problem: Plan of Care - These are the overarching goals to be used throughout the patient stay.    Goal: Absence of Hospital-Acquired Illness or Injury  Intervention: Identify and Manage Fall Risk  Recent Flowsheet Documentation  Taken 7/27/2022 0000 by Loida Root, RN  Safety Promotion/Fall Prevention: nonskid shoes/slippers when out of bed     Problem: Sleep Disturbance  Goal: Adequate Sleep/Rest  Outcome: Ongoing, Progressing

## 2022-07-27 NOTE — PROGRESS NOTES
----------------------------------------------------------------------------------------------------------  [facility]  Psychiatric Progress Note  Hospital Day #11    See Mendenhall MRN# [id number]   Age: 31 year old YOB: 1990   Date of Admission: 7/14/2022     Subjective   The patient was discussed with the treatment team and chart notes were reviewed.      Identifier: See Mendenhall is a 31 year old gentleman with a past psychiatric diagnosis of  Bipolar 2 disorder, substance use disorder, depressive disorder and anxiety disorder, PTSD and ADHD.     Sleep:  6.5 hours (07/27/22 0600) - Sleeping on team arriving to see him in the AM as well  Prescribed Medications: Taken as prescribed  PRN Psychiatric Medications: acetaminophen, albuterol, alum & mag hydroxide-simethicone, hydrocortisone, hydrOXYzine, lidocaine 4%, nicotine, OLANZapine **OR** OLANZapine, QUEtiapine, QUEtiapine     Overnight Nursing Notes/Staff Report:  No acute concerns overnight. He was requesting prn Tramadol for shoulder and arm pain, but was accepting of prn Tylenol.     Patient interview:  See Mendenhall was seen in his room. He again expressed frustration about ongoing hospitalization and delay in CARE referral. He feels strongly that he does not need CARE program as he is willing to attending an unlocked residential CD program and expressed desire to maintain sobriety. However, he continues to minimize severity of CD use and events leading to his hospitalization. He is externalizing blame and taking little accountability for his actions. Continues to have poor insight. He is amenable with plan for transfer back to station 20. He is hoping to have access to his own clothing.     -------------------------------------------------------------------------------------------------------------------------  Suicidal ideation: denies current or recent suicidal ideation or behaviors.  Homicidal  "ideation: denies current or recent homicidal ideation or behaviors.  Psychotic symptoms: Patient denies AH, VH, paranoia, delusions.     Medication side effects reported: No significant side effects.  Acute medical concerns: none     ROS   ROS was negative unless noted above.     Objective   Vitals:  Temp: (!) 96.7  F (35.9  C) (Temp  Min: 96.7  F (35.9  C)  Max: 98.6  F (37  C))  Resp: 16 (Resp  Min: 16  Max: 16)  SpO2: 99 % (SpO2  Min: 96 %  Max: 99 %)  Pulse: 68 (Pulse  Min: 68  Max: 97)  BP: 130/77  Systolic (24hrs), Av , Min:130 , Max:136   Diastolic (24hrs), Av, Min:76, Max:77    (refused vitals)    Mental Status Examination:  Oriented to:  Person/Self, Situation, Date and location  General: Awake and Alert  Appearance:  appears stated age, Posture is reclined in bed, Grooming is adequate and Dressed in hospital scrubs  Behavior:  cooperative, engaged and at times difficult to redirect, but more easy to do so today as compared to yesterday  Eye Contact:  Intermittent  Psychomotor:  no abnormal motor symptoms appreciated; no catatonia present  Speech:  appropriate volume/tone and with good articulation  Language: Fluent in English with appropriate syntax and vocabulary.  Mood:  \"I'm the same, Yarusso\"  Affect:  congruent with mood and irritable  Thought Process:  coherent, linear, logical and goal directed  Thought Content: No SI/HI/AH/VH; No apparent delusions  Associations:  intact  Insight:  fair due to recognizing need to go to CD, but highly externalizing regarding circumstances  Judgment:  fair due to willingness to engage with treatment and go to CD  Attention Span: adequate for conversation  Concentration:  grossly intact  Recent and Remote Memory:  not formally assessed  Fund of Knowledge: average     Allergies     Allergies   Allergen Reactions     Other Drug Allergy (See Comments)      Fake metal        Labs & Imaging   No results found for this or any previous visit (from the past 24 " hour(s)).     Assessment   Diagnostic Impression:  See Mendenhall is a 31 year old gentleman with a past psychiatric diagnosis of  Bipolar 2 disorder, substance use disorder, depressive disorder and anxiety disorder, PTSD and ADHD. He was admitted from the ER on 07/16/2022 where he was brought by the police due to concern for psychosis and SI with plan. This in the context of methamphetamine and cocaine use, unclear regarding medication non-adherence, he has significant history of substance use and psychosocial stressors including housing instability, unemployment, legal issues, trauma and chronic mental health issues.     He was brought to the ED with SI, erratic and impulsive behaviors and psychosis (paranoid delusions and disorganized behavior/thought process) in the context of methamphetamine and cocaine use.  His last psychiatric hospitalization was in April/2022 at [facility] for psychosis in context of substance use.  He is currently followed by PCP Erinn Maradiaga at [facility]. Current psychosocial stressors include legal issues, trauma, chronic mental health issues, family dynamics and medical issues which he has been coping with by using substances, acting out to self and acting out to others.  Patient's support system includes sister Samaria and friend Wilbert.      Substance use does appear to be playing a contributing role in the patient's presentation. Medical history does not appear to be significant, although chart review indicates history of chronic back pain, prescribed anabolic steroids that may be playing a role in presentation. In utero exposure and developmental delay need to be assessed.       Psychosocially history of trauma in childhood, long history of substance use, currently being unemployed and experiencing housing insecurity, legal issues, not being able to see his daughter represent both precipitating and perpetuating factors contributing to presentation.      The MSE  is notable for some persisting paranoia, mood lability, being guarded, tangentiality and limited attention span/concentration, hyper-talkativeness difficult to interrupt speech. He denies self injurious behaviors. His reported symptoms of VH, paranoid delusions and grossly disorganized thought process in the context of methamphetamine and cocaine use are suggestive of substance-induced psychosis, although definitive diagnosis is still in evolution and further collateral information from family/ outpatient provider is needed at this time; differential includes primary psychotic disorder exacerbated by substance use, PTSD, Bipolar Disorder, schizoaffective disorder.  Additionally, he has traits of impulsive behaviors which interfere with his ability to adhere with the treatment plan.       Optimization of medications to target these symptom clusters in addition to evaluation of adequate community supports will be targets for treatment during this admission.      Pt has history of previous attempts and substance use representing heightened acute risk for self/others patient warrants inpatient psychiatric hospitalization to maintain his safety until door to door treatment can be arranged. Disposition pending clinical stabilization, medication optimization and development of an appropriate discharge plan, including CD treatment referral upon discharge.     Principal Diagnoses:   Substance induced psychosis, now resolved  Amphetamine Use Disorder, severe  Cocaine Use Disorder, severe  Sedative Hypnotic Use Disorder, severe  Opiate Use Disorder, unspecified severity  Major depressive disorder, recurrent episode, moderate   Generalized anxiety disorder    Psychiatric course:  See Mendenhall was admitted to [facility] on a 72 hour hold, transferred to [facility] following a conflict with another patient. His PTA prazosin was continued.      See Mendenhall continued to meet criteria for inpatient hospitalization  medication optimization, inpatient stabilization, and appropriate discharge planning.      7/19: added Seroquel 50 mg BEDTIME; 25 mg TID PRN   7/20: Quetiapine (Seroquel) increased to 100 mg, BEDTIME scheduled. Keep 25 mg TID PRN  7/20: melatonin 3mg scheduled per patient request  7/25: Pending confirmation of CARE referral and status on list with outpatient CM. If anticipated to be a prolonged wait for placement, will consider less restrictive options for CD.  7/26: Ongoing issues with outpatient CM Agatha not returning messages or answering calls from team or patient. Message left with CM supervisor given persistent issues reaching CM.  7/27: OP CM strongly advocating for CARE placement given patient's history of several failed treatments and violent/aggressive behaviors in context of substance use. OP CM did confirm that she placed CARE referral. Pt continues to voice frustration about length of stay and delay in CARE referral as he was informed that the referral was placed over one week ago.     Medical course:   See Mendenhall was physically examined by the ED prior to being transferred to the unit and was found to be medically stable and appropriate for admission.      Plan   Today's Changes:     None  _______________________________________________________________________  Psychiatric Management:    Prazosin 1mg at bedtime    Quetiapine 100mg at bedtime    Civil commitment with Das    Additional Planning:    Continue to monitor for and stabilize: irritable, psychosis and substance use    Patient will be treated in therapeutic milieu with appropriate individual and group therapies as described.    Scheduled Medications (summary):  Current Facility-Administered Medications   Medication Dose Route Frequency     melatonin  3 mg Oral At Bedtime     polyethylene glycol  17 g Oral Daily     prazosin  1 mg Oral At Bedtime     QUEtiapine  100 mg Oral At Bedtime     PRN Medications (summary):  Current  Facility-Administered Medications   Medication Dose Route Frequency     acetaminophen  650 mg Oral Q4H PRN     albuterol  2 puff Inhalation Q6H PRN     alum & mag hydroxide-simethicone  30 mL Oral Q4H PRN     hydrocortisone   Topical BID PRN     hydrOXYzine  25 mg Oral Q4H PRN     lidocaine 4%   Topical BID PRN     nicotine  4 mg Buccal Q1H PRN     OLANZapine  5-10 mg Oral TID PRN    Or     OLANZapine  10 mg Intramuscular TID PRN     QUEtiapine  100 mg Oral At Bedtime PRN     QUEtiapine  25-50 mg Oral TID PRN     Legal Status:    Das    Committed     SIO:    No    Pt has not required locked seclusion or restraints in the past 24 hours to maintain safety, please refer to RN documentation for further details.    Safety Assessment:   Behavioral Orders   Procedures     Assault precautions     Code 1 - Restrict to Unit     Routine Programming     As clinically indicated     Status 15     Every 15 minutes.     Disposition:    Reason for ongoing admission: Civil commitment pending placement for CD for direct transfer    Discharge location: TBD, pending clinical stabilization, medication optimization, and formulation of a safe discharge plan. Likely CARE. Pt will be transferred to station 20.     Discharge Medications: not ordered    Follow-up Appointments: not scheduled  ____________________________________________________________________  Pertinent Medical diagnoses and management:    None  ____________________________________________________________________    Marce Ferguson MD  [facility]

## 2022-07-27 NOTE — PLAN OF CARE
Assessment/Intervention/Current Symtoms and Care Coordination  The patient's care was discussed with the treatment team and chart notes were reviewed.     11am - Writer called Agatha Flores w/ [facility] and left a vm asking them to consider Residential MI/CD programming vs CARE.  Asked for return call to discuss this further.  Requested call back ASAP.      Writer also called [facility] to see if the referral had been placed (as Agatha was supposed to complete that last week and still had not done it as of yesterday).  Agatha told me yesterday she would get it completed and we spoke with her Supervisor about it as well.  Writer spoke to Rebecca at [facility] and Agatha still has not placed the referral for CARE and they have no records on him.     Writer got a copy of the CARE form and re-sent to Agatha to see if she was able to open the new format.     See  Sticky note for next steps.     pt s CCM Agatha Flores - phone: [phone]   Milly Khan is her supervisor - Phone: [phone].      Update:  Agatha relayed to writer that she had spoken with her supervisor and at this time they are not in support of him going to a residential program and would only be in support of CARE or a Locked Facility.     Writer asked Agatha multiple times today to call and speak to See via phone. He tells us he has called her many times without ever getting a call back.  He has not spoken to her since he admitted to [facility].     - Upon pt request writer called to find out about his criminal hearings coming up.    [facility] - Court on 9/2 @ 10am (in person) - His  is rFedy Flores- Phone: [phone].    [facility] - Sentencing on 8/5 @ 9:15am for his pending warrants (n-person).  His  is Linda Ochoa - Phone: [phone]  [facility] - He had a hearing today at 9am and they issued a warrant for his arrest.  No .     Writer provided these phone  numbers to him and suggested he call his public defenders to discuss.     Discharge Plan or Goal  CARE facility     Barriers to Discharge   Acute maría  Legal (criminal) status is unclear- Cty CM attempting to clarify this with [facility].  Placement to CARE facility     Referral Status  Patient's Cty CM is placing patient on the CARE wait list     Legal Status     Patient is civilly committed with a Das order through [facility]

## 2022-07-28 PROCEDURE — G0177 OPPS/PHP; TRAIN & EDUC SERV: HCPCS

## 2022-07-28 PROCEDURE — 250N000013 HC RX MED GY IP 250 OP 250 PS 637

## 2022-07-28 PROCEDURE — 90853 GROUP PSYCHOTHERAPY: CPT

## 2022-07-28 PROCEDURE — 250N000013 HC RX MED GY IP 250 OP 250 PS 637: Performed by: PSYCHIATRY & NEUROLOGY

## 2022-07-28 PROCEDURE — 124N000002 HC R&B MH UMMC

## 2022-07-28 PROCEDURE — 99232 SBSQ HOSP IP/OBS MODERATE 35: CPT | Mod: GC | Performed by: PSYCHIATRY & NEUROLOGY

## 2022-07-28 RX ORDER — OLANZAPINE 10 MG/1
10 TABLET ORAL AT BEDTIME
Status: DISCONTINUED | OUTPATIENT
Start: 2022-07-28 | End: 2022-08-29 | Stop reason: HOSPADM

## 2022-07-28 RX ADMIN — HYDROXYZINE HYDROCHLORIDE 25 MG: 25 TABLET ORAL at 11:24

## 2022-07-28 RX ADMIN — ACETAMINOPHEN 325MG 650 MG: 325 TABLET ORAL at 20:58

## 2022-07-28 RX ADMIN — ACETAMINOPHEN 325MG 650 MG: 325 TABLET ORAL at 13:17

## 2022-07-28 RX ADMIN — MELATONIN TAB 3 MG 3 MG: 3 TAB at 20:57

## 2022-07-28 RX ADMIN — OLANZAPINE 10 MG: 10 TABLET, FILM COATED ORAL at 20:57

## 2022-07-28 RX ADMIN — HYDROXYZINE HYDROCHLORIDE 25 MG: 25 TABLET ORAL at 19:41

## 2022-07-28 RX ADMIN — POLYETHYLENE GLYCOL 3350 17 G: 17 POWDER, FOR SOLUTION ORAL at 08:31

## 2022-07-28 RX ADMIN — PRAZOSIN HYDROCHLORIDE 1 MG: 1 CAPSULE ORAL at 20:57

## 2022-07-28 RX ADMIN — OLANZAPINE 10 MG: 5 TABLET, FILM COATED ORAL at 15:00

## 2022-07-28 RX ADMIN — NICOTINE POLACRILEX 4 MG: 4 GUM, CHEWING BUCCAL at 13:16

## 2022-07-28 RX ADMIN — NICOTINE POLACRILEX 4 MG: 4 GUM, CHEWING BUCCAL at 16:14

## 2022-07-28 RX ADMIN — OLANZAPINE 10 MG: 5 TABLET, FILM COATED ORAL at 08:31

## 2022-07-28 RX ADMIN — NICOTINE POLACRILEX 4 MG: 4 GUM, CHEWING BUCCAL at 11:24

## 2022-07-28 RX ADMIN — NICOTINE POLACRILEX 4 MG: 4 GUM, CHEWING BUCCAL at 19:43

## 2022-07-28 ASSESSMENT — ACTIVITIES OF DAILY LIVING (ADL)
ADLS_ACUITY_SCORE: 28
ADLS_ACUITY_SCORE: 28
ORAL_HYGIENE: INDEPENDENT
ADLS_ACUITY_SCORE: 28
LAUNDRY: WITH SUPERVISION
DRESS: INDEPENDENT
ADLS_ACUITY_SCORE: 28
HYGIENE/GROOMING: INDEPENDENT
ADLS_ACUITY_SCORE: 28

## 2022-07-28 NOTE — PLAN OF CARE
"Pt was visible in the milieu sitting in the lounge calmly watching tv with peers. Pt was not having much conversations with peers but just kept to self. Presented with flat affect. Pt endorsed anxiety 10/10 and said he is waiting to talk to the doctors in the morning to request for valium \"they gave it to me in the ER and it worked very well\". Reported anxiety 9/10 saying he lost everything \"I lost my car and everything else\". Denied SI/SIB/AH/VH. Pt took prn Tylenol for pain on right biceps. Pt ate supper 100%.     Problem: Behavioral Health Plan of Care  Goal: Adheres to Safety Considerations for Self and Others  Outcome: Ongoing, Progressing     Problem: Suicidal Behavior  Goal: Suicidal Behavior is Absent or Managed  Outcome: Ongoing, Progressing   Goal Outcome Evaluation:                      "

## 2022-07-28 NOTE — PROGRESS NOTES
Behavioral Health  Note   Behavioral Health  Spirituality Group Note     Unit [facility]    Name: eSe Mendenhall    YOB: 1990   MRN: [id number]    Age: 31 year old     Patient attended -led group, which included discussion of spirituality, coping with illness and building resilience.   Patient attended group for 1.0 hrs.   patient demonstrated an appreciation of topic's application for their personal circumstances.     [facility]  Staff    Page [phone]     no

## 2022-07-28 NOTE — PLAN OF CARE
Patient appears sleeping during rounds. Patient prefers to sleep on the floor. Patient no complains of pain and discomfort.  No behavioral issues or any safety concerns currently. Will continue to monitor the patient and provide therapeutic intervention as needed. Will continue with current plan of care. Notify MD with any concerns. The patient had total 6.75 hours of sleep this shift.   Problem: Sleep Disturbance  Goal: Adequate Sleep/Rest  Outcome: Ongoing, Progressing

## 2022-07-28 NOTE — PROGRESS NOTES
"    ----------------------------------------------------------------------------------------------------------  [facility]  Psychiatric Progress Note  Hospital Day #12    See Mendenhall MRN# [id number]   Age: 31 year old YOB: 1990   Date of Admission: 7/14/2022     Subjective   The patient was discussed with the treatment team and chart notes were reviewed.      Identifier: See Mendenhall is a 31 year old gentleman with a past psychiatric diagnosis of  Bipolar 2 disorder, substance use disorder, depressive disorder and anxiety disorder, PTSD and ADHD.     Sleep:  6.75 hours (07/28/22 0600)   Prescribed Medications: Taken as prescribed  PRN Psychiatric Medications: acetaminophen, albuterol, alum & mag hydroxide-simethicone, hydrocortisone, hydrOXYzine, lidocaine 4%, nicotine, OLANZapine, QUEtiapine    Overnight Nursing Notes/Staff Report:  No acute concerns overnight. Experienced agitation this AM but resolved rapidly with PRN ZYPREXA.     Patient interview:  See Mendenhall was interviewed in the conference room. He has returned to [facility] and appears comfortable and happy to be back. Endorses feeling safe and not threatened here. He expressed some frustration about ongoing hospitalization and delay in CARE referral, emphasizing he is \"ready for the next step\". He is encouraged by the news of a possible opening at a CARE program within a fortnight. Although he feels strongly that he does not need CARE program and would rather go to an unlocked facility, he is compliant and satisfied with the plan. He still requests contact information for his case manger (Agatha) to discuss/explain the circumstances of his admission and relapse as he feels they may be misconstrued in the medical/legal record and he wishes to provide his perspective, emphasizing that he voluntarily presented for treatment and has been cooperative.  information was provided. Nilam " "Abdirashid continues to minimize severity of CD use and events leading to his hospitalization to some extent. However, today he endorses readiness to \"make the next step\", recognizing himself as the one who is control of his health and decisions. He emphasizes that he took accountability by voluntarily presenting for this admission and has been cooperative while here. He will have a sentencing hearing on 2022 (can conduct from [facility]). Although he continues to have poor insight, he seems more willing to take accountability for drug use today. He is amenable with plan for transfer back to [facility] 20. He requests shorts to wear; elopement precautions removed to allow for shorts.      Mr. Mendenhall has noticed that ZYPREXA is very effective controlling his symptoms (agigtation, anxiety) and would like to switch his scheduled SEROQUEL to ZYPREXA. Made SEROQUEL PRN and ordered scheduled ZYPREXA 10 mg at bedtime.   -------------------------------------------------------------------------------------------------------------------------  Suicidal ideation: denies current or recent suicidal ideation or behaviors.  Homicidal ideation: denies current or recent homicidal ideation or behaviors.  Psychotic symptoms: Patient denies AH, VH, paranoia, delusions.     Medication side effects reported: No significant side effects.  Acute medical concerns: none     ROS   ROS was negative unless noted above.     Objective   Vitals:  Temp: 98.2  F (36.8  C) (Temp  Min: 98.2  F (36.8  C)  Max: 98.2  F (36.8  C))  Resp: 16 (Resp  Min: 16  Max: 16)  SpO2: 96 % (SpO2  Min: 96 %  Max: 96 %)  Pulse: 60 (Pulse  Min: 60  Max: 60)  BP: 119/71  Systolic (24hrs), Av , Min:119 , Max:119   Diastolic (24hrs), Av, Min:71, Max:71    (refused vitals)    Mental Status Examination:  Oriented to:  Person/Self, Situation, Date and location  General: Awake and Alert  Appearance:  appears stated age, Posture is reclined in bed, Grooming is " adequate and Dressed in hospital scrubs  Behavior:  cooperative, engaged and at times difficult to redirect, but more easy to do so today as compared to yesterday  Eye Contact:  adequate  Psychomotor:  no abnormal motor symptoms appreciated; no catatonia present  Speech:  appropriate volume/tone and with good articulation  Language: Fluent in English with appropriate syntax and vocabulary.  Mood:  Positive. Feels safe and not threatened after transfer to station 20.  Affect:  congruent with mood and irritable  Thought Process:  coherent, linear, logical and goal directed  Thought Content: No SI/HI/AH/VH; No apparent delusions  Associations:  intact  Insight:  fair due to recognizing need to go to CD, but highly externalizing regarding circumstances  Judgment:  fair due to willingness to engage with treatment and go to CD  Attention Span: adequate for conversation  Concentration:  grossly intact  Recent and Remote Memory:  not formally assessed  Fund of Knowledge: average     Allergies     Allergies   Allergen Reactions     Other Drug Allergy (See Comments)      Fake metal        Labs & Imaging   No results found for this or any previous visit (from the past 24 hour(s)).     Assessment   Diagnostic Impression:  See Mendenhall is a 31 year old gentleman with a past psychiatric diagnosis of  Bipolar 2 disorder, substance use disorder, depressive disorder and anxiety disorder, PTSD and ADHD. He was admitted from the ER on 07/16/2022 where he was brought by the police due to concern for psychosis and SI with plan. This in the context of methamphetamine and cocaine use, unclear regarding medication non-adherence, he has significant history of substance use and psychosocial stressors including housing instability, unemployment, legal issues, trauma and chronic mental health issues.     He was brought to the ED with SI, erratic and impulsive behaviors and psychosis (paranoid delusions and disorganized behavior/thought  process) in the context of methamphetamine and cocaine use.  His last psychiatric hospitalization was in April/2022 at [facility] for psychosis in context of substance use.  He is currently followed by PCP Erinn Maradiaga at [facility]. Current psychosocial stressors include legal issues, trauma, chronic mental health issues, family dynamics and medical issues which he has been coping with by using substances, acting out to self and acting out to others.  Patient's support system includes sister Samaria and friend Wilbert.      Substance use does appear to be playing a contributing role in the patient's presentation. Medical history does not appear to be significant, although chart review indicates history of chronic back pain, prescribed anabolic steroids that may be playing a role in presentation. In utero exposure and developmental delay need to be assessed.       Psychosocially history of trauma in childhood, long history of substance use, currently being unemployed and experiencing housing insecurity, legal issues, not being able to see his daughter represent both precipitating and perpetuating factors contributing to presentation.      The MSE is notable for some persisting paranoia, mood lability, being guarded, tangentiality and limited attention span/concentration, hyper-talkativeness difficult to interrupt speech. He denies self injurious behaviors. His reported symptoms of VH, paranoid delusions and grossly disorganized thought process in the context of methamphetamine and cocaine use are suggestive of substance-induced psychosis, although definitive diagnosis is still in evolution and further collateral information from family/ outpatient provider is needed at this time; differential includes primary psychotic disorder exacerbated by substance use, PTSD, Bipolar Disorder, schizoaffective disorder.  Additionally, he has traits of impulsive behaviors which interfere with his ability to  adhere with the treatment plan.       Optimization of medications to target these symptom clusters in addition to evaluation of adequate community supports will be targets for treatment during this admission.      Pt has history of previous attempts and substance use representing heightened acute risk for self/others patient warrants inpatient psychiatric hospitalization to maintain his safety until door to door treatment can be arranged. Disposition pending clinical stabilization, medication optimization and development of an appropriate discharge plan, including CD treatment referral upon discharge.     Principal Diagnoses:   Substance induced psychosis, now resolved  Amphetamine Use Disorder, severe  Cocaine Use Disorder, severe  Sedative Hypnotic Use Disorder, severe  Opiate Use Disorder, unspecified severity  Major depressive disorder, recurrent episode, moderate   Generalized anxiety disorder    Psychiatric course:  See Mendenhall was admitted to [facility] on a 72 hour hold, transferred to [facility] following a conflict with another patient. Patient in question has been removed from [facility], so now Mr. Mendenhall has returned to [facility]. His PTA prazosin was continued.      See Mendenhall continued to meet criteria for inpatient hospitalization medication optimization, inpatient stabilization, and appropriate discharge planning.      7/19: added Seroquel 50 mg BEDTIME; 25 mg TID PRN   7/20: Quetiapine (Seroquel) increased to 100 mg, BEDTIME scheduled. Keep 25 mg TID PRN  7/20: melatonin 3mg scheduled per patient request  7/25: Pending confirmation of CARE referral and status on list with outpatient CM. If anticipated to be a prolonged wait for placement, will consider less restrictive options for CD.  7/26: Ongoing issues with outpatient CM Agatha not returning messages or answering calls from team or patient. Message left with CM supervisor given persistent issues reaching CM.  7/27: OP CM strongly  advocating for CARE placement given patient's history of several failed treatments and violent/aggressive behaviors in context of substance use. OP CM did confirm that she placed CARE referral. Pt continues to voice frustration about length of stay and delay in CARE referral as he was informed that the referral was placed over one week ago.   7/28: Returned to [facility] from [facility]. CARE placement planned, likely will be available within two weeks. Mr. Mendenhall requests audience with care coordinator (Agatha) to explain circumstances of relapse and admission, even if it will not change the course of treatment or discharge to CARE facility, still wishes to clarify details.    Medical course:   See Mendenhall was physically examined by the ED prior to being transferred to the unit and was found to be medically stable and appropriate for admission.     7/28: Changed scheduled SEROQUEL to ZYPREXA 10 mg at bedtime. SEROQUEL ordered PRN     Plan   Today's Changes:     Ordered ZYPREXA scheduled 10 mg at bedtime    Made SEROQUEL PRN  _______________________________________________________________________  Psychiatric Management:    Prazosin 1mg at bedtime    Quetiapine 100mg at bedtime    Civil commitment with Das    Additional Planning:    Continue to monitor for and stabilize: irritable, psychosis and substance use    Patient will be treated in therapeutic milieu with appropriate individual and group therapies as described.    Scheduled Medications (summary):  Current Facility-Administered Medications   Medication Dose Route Frequency     melatonin  3 mg Oral At Bedtime     OLANZapine  10 mg Oral At Bedtime     polyethylene glycol  17 g Oral Daily     prazosin  1 mg Oral At Bedtime     PRN Medications (summary):  Current Facility-Administered Medications   Medication Dose Route Frequency     acetaminophen  650 mg Oral Q4H PRN     albuterol  2 puff Inhalation Q6H PRN     alum & mag hydroxide-simethicone  30 mL  Oral Q4H PRN     hydrocortisone   Topical BID PRN     hydrOXYzine  25 mg Oral Q4H PRN     lidocaine 4%   Topical BID PRN     nicotine  4 mg Buccal Q1H PRN     OLANZapine  5-10 mg Oral TID PRN    Or     OLANZapine  10 mg Intramuscular TID PRN     QUEtiapine  100 mg Oral At Bedtime PRN     QUEtiapine  25-50 mg Oral TID PRN     Legal Status:    Das    Committed     SIO:    No    Pt has not required locked seclusion or restraints in the past 24 hours to maintain safety, please refer to RN documentation for further details.    Safety Assessment:   Behavioral Orders   Procedures     Assault precautions     Code 1 - Restrict to Unit     Routine Programming     As clinically indicated     Status 15     Every 15 minutes.     Disposition:    Reason for ongoing admission: Civil commitment pending placement for CD for direct transfer    Discharge location: TBD, pending clinical stabilization, medication optimization, and formulation of a safe discharge plan. Likely CARE, possibly in the next 14 days.      Discharge Medications: not ordered    Follow-up Appointments: not scheduled  ____________________________________________________________________  Pertinent Medical diagnoses and management:    None  ____________________________________________________________________  Patient seen and discussed with my resident physician, Dr. Theresa Reese MD and attending physician Dr. Galina De Jesus MD who are in agreement with my assessment and plan.    Nick Coates,       The patient was seen and discussed with the medical student. I concur with the student's note.    Theresa Reese  PGY1 Psychiatry Resident    Attestation:  This patient has been seen and evaluated by me, Glaina De Jesus MD.  I have discussed this patient with the house staff team including the resident and medical student and I agree with the findings and plan in this note.    I have reviewed today's vital signs, medications, labs and imaging. Galina  MD Neal , PhD.

## 2022-07-28 NOTE — PLAN OF CARE
Assessment/Intervention/Current Symtoms and Care Coordination  The patient's care was discussed with the treatment team and chart notes were reviewed.  CTC obtained updated from [facility].  This writer will contact patient's CM to request CARE update.  Patient s CCM Agatha Flores - phone: [phone]   Milly Deutschafshin is her supervisor - Phone: [phone].    Patient met with team- aware of plan for CARE          Discharge Plan or Goal  CARE facility     Barriers to Discharge   Further psychiatric  Stabilization - medication mgmt  Placement to CARE facility     Referral Status  Patient's Cty CM is placing patient on the CARE wait list     Legal Status     Civil commitment with Das order

## 2022-07-28 NOTE — PLAN OF CARE
At start of shift, patient presented as irritable and then irritability turned to agitation. He rated anxiety at 10/10 at that time. PRN Zyprexa was given and it proved to be effective. At 1115, PRN hydroxyzine was given for anxiety. Patient was isolative to self and did not engage with peers.       Problem: Plan of Care - These are the overarching goals to be used throughout the patient stay.    Goal: Optimal Comfort and Wellbeing  Outcome: Ongoing, Progressing     Problem: Behavioral Health Plan of Care  Goal: Optimal Comfort and Wellbeing  Outcome: Ongoing, Progressing

## 2022-07-28 NOTE — PLAN OF CARE
[facility] Occupational Therapy Group Intervention Note     07/28/22 1407   Group Therapy Session   Group Attendance attended group session   Time Session Began 1315   Time Session Ended 1400   Total Time patient participated (minutes) 40   Total # Attendees 4   Group Type task skill;life skill   Group Topic Covered relapse prevention;emotions/expression   Group Session Detail clinic - coping skill exploration, creative expression within personally meaningful activities, and observation of social, cognitive, and kinesthetic performance skills   Patient Response/Contribution cooperative with task;discussed personal experience with topic;organized   Patient Response Detail Utilized clinic time to complete his past/present/future collage from this morning. Added great detail compared to this morning; demonstrated insight as he stated frustration over loosing various important relationships/aspects of his life due to drugs. Engaged in change talk and identified specific goals. Expressed feeling proud of himself for completing this project.      Opal Jaeger OT on 7/28/2022 at 2:09 PM

## 2022-07-28 NOTE — PLAN OF CARE
07/28/22 1501   Group Therapy Session   Group Attendance attended group session   Time Session Began 0200   Time Session Ended 0250   Total Time patient participated (minutes) 50   Total # Attendees 4   Group Type psychoeducation;psychotherapeutic   Group Topic Covered balanced lifestyle;coping skills/lifestyle management;medication management;relapse prevention;self-care activities   Group Session Detail Topic: mental illness diagnoses, treatment options   Patient Response/Contribution cooperative with task;discussed personal experience with topic;listened actively;verbalizations were off topic   Patient Response Detail Patient was attentive throughout group however when discussion started,patient became hyperverbal- shared his  experiences, but then went on and on about his xGF's mental illness/personal issues despite several attempts to redirect him, request he limit discussion to his own MH.

## 2022-07-28 NOTE — PLAN OF CARE
[facility] Occupational Therapy Group Intervention Note     07/28/22 1228   Group Therapy Session   Group Attendance attended group session   Time Session Began 1115   Time Session Ended 1200   Total Time patient participated (minutes) 1200   Total # Attendees 40   Group Type psychoeducation;task skill   Group Topic Covered coping skills/lifestyle management;emotions/expression;relapse prevention   Group Session Detail Topic group for general health and coping: Collage focused on insight development specifically past, present, and future supports/barriers related to recovery.    Patient Response/Contribution cooperative with task;organized;discussed personal experience with topic   Patient Response Detail Congruent affect. Able to organize and superficially apply concepts to collage, sequence activity, and clean-up. Demonstrated openness with peers; Pt used a lot of movement throughout his collage. For example, he selected pictures that eluded to running away from his past and/or compartmentalizing. Also, expressed hopefulness to strengthen in the future, yet, did not identify specific goals.      Opal Jaeger, OT on 7/28/2022 at 12:29 PM

## 2022-07-29 PROCEDURE — 124N000002 HC R&B MH UMMC

## 2022-07-29 PROCEDURE — 250N000013 HC RX MED GY IP 250 OP 250 PS 637: Performed by: PSYCHIATRY & NEUROLOGY

## 2022-07-29 PROCEDURE — 250N000013 HC RX MED GY IP 250 OP 250 PS 637

## 2022-07-29 PROCEDURE — 99232 SBSQ HOSP IP/OBS MODERATE 35: CPT | Mod: GC | Performed by: PSYCHIATRY & NEUROLOGY

## 2022-07-29 PROCEDURE — G0177 OPPS/PHP; TRAIN & EDUC SERV: HCPCS

## 2022-07-29 PROCEDURE — H2032 ACTIVITY THERAPY, PER 15 MIN: HCPCS

## 2022-07-29 RX ORDER — NAPROXEN 250 MG/1
250 TABLET ORAL 3 TIMES DAILY PRN
Status: DISCONTINUED | OUTPATIENT
Start: 2022-07-29 | End: 2022-08-15

## 2022-07-29 RX ORDER — NAPROXEN 250 MG/1
250 TABLET ORAL 2 TIMES DAILY WITH MEALS
Status: DISCONTINUED | OUTPATIENT
Start: 2022-07-29 | End: 2022-07-29 | Stop reason: DRUGHIGH

## 2022-07-29 RX ADMIN — OLANZAPINE 10 MG: 5 TABLET, FILM COATED ORAL at 09:24

## 2022-07-29 RX ADMIN — POLYETHYLENE GLYCOL 3350 17 G: 17 POWDER, FOR SOLUTION ORAL at 08:26

## 2022-07-29 RX ADMIN — HYDROXYZINE HYDROCHLORIDE 25 MG: 25 TABLET ORAL at 11:09

## 2022-07-29 RX ADMIN — NICOTINE POLACRILEX 4 MG: 4 GUM, CHEWING BUCCAL at 17:50

## 2022-07-29 RX ADMIN — OLANZAPINE 10 MG: 10 TABLET, FILM COATED ORAL at 20:45

## 2022-07-29 RX ADMIN — PRAZOSIN HYDROCHLORIDE 1 MG: 1 CAPSULE ORAL at 20:45

## 2022-07-29 RX ADMIN — OLANZAPINE 10 MG: 5 TABLET, FILM COATED ORAL at 16:54

## 2022-07-29 RX ADMIN — NICOTINE POLACRILEX 4 MG: 4 GUM, CHEWING BUCCAL at 20:54

## 2022-07-29 RX ADMIN — NICOTINE POLACRILEX 4 MG: 4 GUM, CHEWING BUCCAL at 11:09

## 2022-07-29 RX ADMIN — ACETAMINOPHEN 325MG 650 MG: 325 TABLET ORAL at 19:27

## 2022-07-29 RX ADMIN — MELATONIN TAB 3 MG 3 MG: 3 TAB at 20:45

## 2022-07-29 RX ADMIN — NICOTINE POLACRILEX 4 MG: 4 GUM, CHEWING BUCCAL at 08:49

## 2022-07-29 RX ADMIN — ACETAMINOPHEN 325MG 650 MG: 325 TABLET ORAL at 23:11

## 2022-07-29 RX ADMIN — NICOTINE POLACRILEX 4 MG: 4 GUM, CHEWING BUCCAL at 10:09

## 2022-07-29 RX ADMIN — HYDROXYZINE HYDROCHLORIDE 25 MG: 25 TABLET ORAL at 23:11

## 2022-07-29 RX ADMIN — NICOTINE POLACRILEX 4 MG: 4 GUM, CHEWING BUCCAL at 18:45

## 2022-07-29 ASSESSMENT — ACTIVITIES OF DAILY LIVING (ADL)
ADLS_ACUITY_SCORE: 28
DRESS: SCRUBS (BEHAVIORAL HEALTH);INDEPENDENT
ORAL_HYGIENE: INDEPENDENT
ADLS_ACUITY_SCORE: 28
LAUNDRY: WITH SUPERVISION
ADLS_ACUITY_SCORE: 28
HYGIENE/GROOMING: HANDWASHING;INDEPENDENT;SHOWER
ADLS_ACUITY_SCORE: 28

## 2022-07-29 NOTE — PROGRESS NOTES
"SPIRITUAL HEALTH SERVICES  SPIRITUAL ASSESSMENT Progress Note  [facility] ([facility]      REFERRAL SOURCE: I did try to visit patient See per [facility] hospital  visit. I introduced myself as the unit  and shared all the info he needs to know. Pt attended yesterday the spirituality group and he was active participant. Pt appreciated the  visit but he said, \"at this moment I am okay but if I need your help, I will let you know\". I did respect the pt respond and left him alone in his room.    PLAN: I will remain open to provide spiritual care for the pt as needed.    Raegan Rios M.Div. ([place]), M.Th., D.Min., [facility]  Staff   Pager [phone]    "

## 2022-07-29 NOTE — PLAN OF CARE
Assessment/Intervention/Current Symtoms and Care Coordination  The patient's care was discussed with the treatment team and chart notes were reviewed.  Patient c/o anxiety- wanting benzo's. Patient remains s/w hyperverbal, poor insight.  Has been attending groups, social with peers.  He remains pleasant, compliant with meds.  CTC spoke to patient s [facility] Agatha Flores [phone])   Med records x past 10 days faxed to her for CARE - CPA  She will come to the unit to visit patient 8/1/22 at 2:30pm.  Patient was informed  Patient aware of plan for CARE       Discharge Plan or Goal  CARE facility     Barriers to Discharge   Further psychiatric  Stabilization - medication mgmt  Placement to CARE facility due to h/o repeatedly elopement from tx     Referral Status  Patient's Cty CM is placing patient on the CARE wait list     Legal Status     Civil commitment with Das order

## 2022-07-29 NOTE — PLAN OF CARE
"Goal Outcome Evaluation:    Pt is pleasant and appears calm; affect full range, smiling. He states his mood is \"anxious.\" He rates his depression and anxiety both 10/10. Pt said his concentration is poor, d/t ADD. He also states he has difficulty falling asleep, at night. Pt said he will attend all grps held, is social, and is mainly in the milieu. His goal for the day is \"to hear good news from Care.\" Pt is wanting to talk to his providers re ordering valium for his anxiety. He requested Zyprexa this am.    1511) Pt is in the DR reading, headphones on. He had requested repeat of Zyprexa, also nicotine, earlier this afternoon-then walked away. He appears calm, presently.              "

## 2022-07-29 NOTE — PROGRESS NOTES
"    ----------------------------------------------------------------------------------------------------------  [facility]  Psychiatric Progress Note  Hospital Day #13    See Mendenhall MRN# [id number]   Age: 31 year old YOB: 1990   Date of Admission: 7/14/2022     Subjective   The patient was discussed with the treatment team and chart notes were reviewed.      Identifier: See Mendenhall is a 31 year old gentleman with a past psychiatric diagnosis of  Bipolar 2 disorder, substance use disorder, depressive disorder and anxiety disorder, PTSD and ADHD.     Sleep:  7 hours (07/29/22 0632)   Prescribed Medications: Taken as prescribed  PRN Psychiatric Medications: acetaminophen, albuterol, alum & mag hydroxide-simethicone, hydrocortisone, hydrOXYzine, lidocaine 4%, nicotine, OLANZapine, QUEtiapine    Overnight Nursing Notes/Staff Report:  No acute concerns overnight. No behavioral concerns this AM. Slept 7 hours.    Yesterday evening was keeping to himself. Presented with flat affect. Complained of 9-10/10 anxiety complaining \"I lost my car and everything else.\" Said he would request Valium from physicians in AM.  Denies SI/SIB. No AH/VH.     Patient interview:  See Mendenhall was interviewed in the conference room. Endorses persistent anxiety here. Requests Diazepam for symptomatic relief. Reports shoulder pain related to rotator cuff surgery last November and Biceps tear in December. Requests Tramadol for this. Given history of CD and abuse liability of benzodiazepines and mu opioid agonists, we explained these are not good options for him and that CARE facility would not likely take him if he was on these medications. He expressed frustration with this response, insisting he has received these medications PTA, has been prescribed them, and has never abused these medications. He indicated he does not feel that he is getting optimal care here, feels he cannot thrive in " "locked CARE facility, and notified us that he called [facility] to express concerns. Mr. Mendenhall requested transfer. Explained that he is under commitment here and that lateral transfers to facilities with similar or lower acuity level are not appropriate under EMTALA and that he would need to discuss concerns with supervised, locked CARE failcity with  as this is currently the only facility type the court will support in his case. In the context of CD and recent relapse, prescription request was refused by care team. We clearly explained the contraindications of these medications, both of which have significant abuse liability, in the context of his history of chemical dependency. Instead, he was offered lidocaine cream and naproxen PRN for biceps pain (along with standing PRN tylenol) and for anxiety offered an increase in ZYPREXA to 40 mg. Although argumentative, Mr. Mendenhall accepted this compromise. Requests foam mattress to help with shoulder pain, ordered. Requests tennis shoes and shorts, approved and elopement precautions removed yesterday to allow him to wear shoes and shorts.    Mr. Mendenhall expressed significant frustration with ongoing commitment and delay in CARE referral,as well as inability to reach his  (Agatha). He remains defensive about circumstances surrounding his commitment including police warrants, hospitalization at [facility], legal problems, and substance use relapse. He insists that the circumstances have been misconstrued in the medical/legal record and he wishes to provide his perspective, projecting blame onto care team at [facility] and circumstance. States \"I don't want to run from treatment and I don't want it to look that way.\" He exhibits continued failure to take accountability for relapse and legal problems. Mr. Mendenhall repeatedly accuses care team of not supporting the truth of his circumstances or his goals saying \"I know nobody in this " "room will go to bat for me.\" He repeatedly demands support from care team to obtain audience with  (Agatha) to discuss/explain the circumstances of his admission and relapse.  information was provided. She plans to meet with Mr. Mendenhall on Monday and he was notified of this which improved his mood. Mr. Mendenhall continues to minimize severity of CD use and events leading to his hospitalization to some extent.   -------------------------------------------------------------------------------------------------------------------------  Suicidal ideation: denies current or recent suicidal ideation or behaviors.  Homicidal ideation: denies current or recent homicidal ideation or behaviors.  Psychotic symptoms: Patient denies AH, VH, paranoia, delusions.     Medication side effects reported: No significant side effects.  Acute medical concerns: none     ROS   ROS was negative unless noted above.     Objective   Vitals:  Temp: 97.9  F (36.6  C) (Temp  Min: 97.9  F (36.6  C)  Max: 98.7  F (37.1  C))  Resp: 16 (Resp  Min: 16  Max: 16)  SpO2: 98 % (SpO2  Min: 98 %  Max: 98 %)  Pulse: 75 (Pulse  Min: 75  Max: 77)  BP: (!) 146/78  Systolic (24hrs), Av , Min:128 , Max:146   Diastolic (24hrs), Av, Min:72, Max:78    (refused vitals)    Mental Status Examination:  Oriented to:  Person/Self, Situation, Date and location  General: Awake and Alert  Appearance:  appears stated age, Posture is reclined in bed, Grooming is adequate and Dressed in hospital scrubs  Behavior:  minimizing, accusatory, difficult to redirect, hostile, defensive, evasive and perseverating on anxiety for which he requests BDZ  Eye Contact:  adequate  Psychomotor:  no abnormal motor symptoms appreciated; no catatonia present  Speech:  appropriate volume/tone, talkative and with good articulation  Language: Fluent in English with appropriate syntax and vocabulary.  Mood:  Positive. Feels safe and not threatened after " transfer to station 20.  Affect:  congruent with mood, labile, irritable and anxious  Thought Process:  coherent, linear and logical  Thought Content: No SI/HI/AH/VH; No apparent delusions  Associations:  intact  Insight:  fair due to recognizing need to go to CD, but highly externalizing regarding circumstances  Judgment:  fair due to willingness to engage with treatment and go to CD  Attention Span: adequate for conversation  Concentration:  grossly intact  Recent and Remote Memory:  not formally assessed  Fund of Knowledge: average     Allergies     Allergies   Allergen Reactions     Other Drug Allergy (See Comments)      Fake metal        Labs & Imaging   No results found for this or any previous visit (from the past 24 hour(s)).     Assessment   Diagnostic Impression:  See Mendenhall is a 31 year old gentleman with a past psychiatric diagnosis of  Bipolar 2 disorder, substance use disorder, depressive disorder and anxiety disorder, PTSD and ADHD. He was admitted from the ER on 07/16/2022 where he was brought by the police due to concern for psychosis and SI with plan. This in the context of methamphetamine and cocaine use, unclear regarding medication non-adherence, he has significant history of substance use and psychosocial stressors including housing instability, unemployment, legal issues, trauma and chronic mental health issues.     He was brought to the ED with SI, erratic and impulsive behaviors and psychosis (paranoid delusions and disorganized behavior/thought process) in the context of methamphetamine and cocaine use.  His last psychiatric hospitalization was in April/2022 at [facility] for psychosis in context of substance use.  He is currently followed by Erinn Frazier at [facility]. Current psychosocial stressors include legal issues, trauma, chronic mental health issues, family dynamics and medical issues which he has been coping with by using substances, acting out to  self and acting out to others.  Patient's support system includes sister Samaria and friend Wilbert.      Substance use does appear to be playing a contributing role in the patient's presentation. Medical history does not appear to be significant, although chart review indicates history of chronic back pain, prescribed anabolic steroids that may be playing a role in presentation. In utero exposure and developmental delay need to be assessed.       Psychosocially history of trauma in childhood, long history of substance use, currently being unemployed and experiencing housing insecurity, legal issues, not being able to see his daughter represent both precipitating and perpetuating factors contributing to presentation.      The MSE is notable for some persisting paranoia, mood lability, being guarded, tangentiality and limited attention span/concentration, hyper-talkativeness difficult to interrupt speech. He denies self injurious behaviors. His reported symptoms of VH, paranoid delusions and grossly disorganized thought process in the context of methamphetamine and cocaine use are suggestive of substance-induced psychosis, although definitive diagnosis is still in evolution and further collateral information from family/ outpatient provider is needed at this time; differential includes primary psychotic disorder exacerbated by substance use, PTSD, Bipolar Disorder, schizoaffective disorder.  Additionally, he has traits of impulsive behaviors which interfere with his ability to adhere with the treatment plan.       Optimization of medications to target these symptom clusters in addition to evaluation of adequate community supports will be targets for treatment during this admission.      Pt has history of previous attempts and substance use representing heightened acute risk for self/others patient warrants inpatient psychiatric hospitalization to maintain his safety until door to door treatment can be arranged.  Disposition pending clinical stabilization, medication optimization and development of an appropriate discharge plan, including CD treatment referral upon discharge.     Principal Diagnoses:   Substance induced psychosis, now resolved  Amphetamine Use Disorder, severe  Cocaine Use Disorder, severe  Sedative Hypnotic Use Disorder, severe  Opiate Use Disorder, unspecified severity  Major depressive disorder, recurrent episode, moderate   Generalized anxiety disorder    Psychiatric course:  See Mendenhall was admitted to [facility] on a 72 hour hold, transferred to Station 12 following a conflict with another patient. Patient in question has been removed from Station 20, so now Mr. Mendenhall has returned to [facility] 20. His PTA prazosin was continued.      See Mendenhall continued to meet criteria for inpatient hospitalization medication optimization, inpatient stabilization, and appropriate discharge planning.      7/19: added Seroquel 50 mg BEDTIME; 25 mg TID PRN   7/20: Quetiapine (Seroquel) increased to 100 mg, BEDTIME scheduled. Keep 25 mg TID PRN  7/20: melatonin 3mg scheduled per patient request  7/25: Pending confirmation of CARE referral and status on list with outpatient CM. If anticipated to be a prolonged wait for placement, will consider less restrictive options for CD.  7/26: Ongoing issues with outpatient CM Agatha not returning messages or answering calls from team or patient. Message left with CM supervisor given persistent issues reaching CM.  7/27: OP CM strongly advocating for CARE placement given patient's history of several failed treatments and violent/aggressive behaviors in context of substance use. OP CM did confirm that she placed CARE referral. Pt continues to voice frustration about length of stay and delay in CARE referral as he was informed that the referral was placed over one week ago.   7/28: Returned to [facility] 20 from [facility] 12. CARE placement planned, likely will be available  within two weeks. Mr. Mendenhall requests audience with  (Agatha) to explain circumstances of relapse and admission, even if it will not change the course of treatment or discharge to CARE facility, still wishes to clarify details.  7/29: CARE placement planned, but Mr. Mendenhall continues to request audience with  (Agatha). Should have been placed on list for CARE facility previously as he is on hospital day 13. Will ensure this is done today.  scheduled to visit unit and speak with Mr. Mendenhall on 8/1.     Medical course:   See Mendenhall was physically examined by the ED prior to being transferred to the unit and was found to be medically stable and appropriate for admission.     7/28: Changed scheduled SEROQUEL to ZYPREXA 10 mg at bedtime. SEROQUEL ordered PRN  7/29: Persistent anxiety, request for valium denied due to CD history and need to maintain CARE eligibility. Increased PRN ZYPREXA to accommodate maximum total dose of 40 mg/day (along with scheduled). Complains of shoulder pain s/p rotator cuff surgery and biceps tear in 12/2021, request for tramadol denied due to CD history. Ordered PRN naproxen and PRN lidocaine cream instead.      Plan   Today's Changes:     Increased ZYPREXA 5-10 mg PRN to maximum total daily dose of 40 mg (including scheduled bedtime dose)    Ordered PRN naproxen and PRN lidocaine cream for pain management    Foam mattress topper    Tennis shoes and shorts permitted  _______________________________________________________________________  Psychiatric Management:    Prazosin 1mg at bedtime    Quetiapine 100mg at bedtime    Civil commitment with [facility]    Additional Planning:    Continue to monitor for and stabilize: irritable, psychosis and substance use    Patient will be treated in therapeutic milieu with appropriate individual and group therapies as described.    Scheduled Medications (summary):  Current Facility-Administered Medications    Medication Dose Route Frequency     melatonin  3 mg Oral At Bedtime     OLANZapine  10 mg Oral At Bedtime     polyethylene glycol  17 g Oral Daily     prazosin  1 mg Oral At Bedtime     PRN Medications (summary):  Current Facility-Administered Medications   Medication Dose Route Frequency     acetaminophen  650 mg Oral Q4H PRN     albuterol  2 puff Inhalation Q6H PRN     alum & mag hydroxide-simethicone  30 mL Oral Q4H PRN     hydrocortisone   Topical BID PRN     hydrOXYzine  25 mg Oral Q4H PRN     lidocaine 4%   Topical BID PRN     naproxen  250 mg Oral TID PRN     nicotine  4 mg Buccal Q1H PRN     OLANZapine  5-10 mg Oral TID PRN    Or     OLANZapine  10 mg Intramuscular TID PRN     QUEtiapine  100 mg Oral At Bedtime PRN     QUEtiapine  25-50 mg Oral TID PRN     Legal Status:    Das    Committed     SIO:    No    Pt has not required locked seclusion or restraints in the past 24 hours to maintain safety, please refer to RN documentation for further details.    Safety Assessment:   Behavioral Orders   Procedures     Assault precautions     Code 1 - Restrict to Unit     Routine Programming     As clinically indicated     Status 15     Every 15 minutes.     Disposition:    Reason for ongoing admission: Civil commitment pending placement for CD for direct transfer    Discharge location: TBD, pending clinical stabilization, medication optimization, and formulation of a safe discharge plan. Likely CARE, possibly in the next 14 days.      Discharge Medications: not ordered    Follow-up Appointments: not scheduled  ____________________________________________________________________  Pertinent Medical diagnoses and management:    None  ____________________________________________________________________  Patient seen and discussed with my resident physician, Dr. Theresa Reese MD and attending physician Dr. Galina De Jesus MD who are in agreement with my assessment and plan.    Nick Coates       The  patient was seen and discussed with the medical student. I concur with the student's note.    Theresa Reese  PGY1 Psychiatry Resident    Attestation:  This patient has been seen and evaluated by me, Galina De Jesus MD.  I have discussed this patient with the house staff team including the resident and medical student and I agree with the findings and plan in this note.    I have reviewed today's vital signs, medications, labs and imaging. Galina De Jesus MD , PhD.

## 2022-07-30 PROCEDURE — 250N000013 HC RX MED GY IP 250 OP 250 PS 637: Performed by: PSYCHIATRY & NEUROLOGY

## 2022-07-30 PROCEDURE — 124N000002 HC R&B MH UMMC

## 2022-07-30 PROCEDURE — 250N000013 HC RX MED GY IP 250 OP 250 PS 637

## 2022-07-30 PROCEDURE — H2032 ACTIVITY THERAPY, PER 15 MIN: HCPCS

## 2022-07-30 RX ADMIN — POLYETHYLENE GLYCOL 3350 17 G: 17 POWDER, FOR SOLUTION ORAL at 07:50

## 2022-07-30 RX ADMIN — NICOTINE POLACRILEX 4 MG: 4 GUM, CHEWING BUCCAL at 20:09

## 2022-07-30 RX ADMIN — NICOTINE POLACRILEX 4 MG: 4 GUM, CHEWING BUCCAL at 00:25

## 2022-07-30 RX ADMIN — ACETAMINOPHEN 325MG 650 MG: 325 TABLET ORAL at 17:26

## 2022-07-30 RX ADMIN — PRAZOSIN HYDROCHLORIDE 1 MG: 1 CAPSULE ORAL at 20:10

## 2022-07-30 RX ADMIN — OLANZAPINE 10 MG: 10 TABLET, FILM COATED ORAL at 21:30

## 2022-07-30 RX ADMIN — NICOTINE POLACRILEX 4 MG: 4 GUM, CHEWING BUCCAL at 13:03

## 2022-07-30 RX ADMIN — MELATONIN TAB 3 MG 3 MG: 3 TAB at 20:10

## 2022-07-30 RX ADMIN — NICOTINE POLACRILEX 4 MG: 4 GUM, CHEWING BUCCAL at 14:11

## 2022-07-30 RX ADMIN — NICOTINE POLACRILEX 4 MG: 4 GUM, CHEWING BUCCAL at 17:26

## 2022-07-30 RX ADMIN — OLANZAPINE 5 MG: 5 TABLET, FILM COATED ORAL at 13:03

## 2022-07-30 RX ADMIN — HYDROXYZINE HYDROCHLORIDE 25 MG: 25 TABLET ORAL at 14:14

## 2022-07-30 RX ADMIN — NICOTINE POLACRILEX 4 MG: 4 GUM, CHEWING BUCCAL at 11:32

## 2022-07-30 RX ADMIN — NICOTINE POLACRILEX 4 MG: 4 GUM, CHEWING BUCCAL at 09:47

## 2022-07-30 RX ADMIN — ACETAMINOPHEN 325MG 650 MG: 325 TABLET ORAL at 21:31

## 2022-07-30 RX ADMIN — NICOTINE POLACRILEX 4 MG: 4 GUM, CHEWING BUCCAL at 18:51

## 2022-07-30 RX ADMIN — NICOTINE POLACRILEX 4 MG: 4 GUM, CHEWING BUCCAL at 08:47

## 2022-07-30 RX ADMIN — OLANZAPINE 5 MG: 5 TABLET, FILM COATED ORAL at 00:25

## 2022-07-30 ASSESSMENT — ACTIVITIES OF DAILY LIVING (ADL)
ORAL_HYGIENE: INDEPENDENT
ADLS_ACUITY_SCORE: 28
DRESS: INDEPENDENT
ADLS_ACUITY_SCORE: 28
LAUNDRY: WITH SUPERVISION
ADLS_ACUITY_SCORE: 28
DRESS: SCRUBS (BEHAVIORAL HEALTH);INDEPENDENT
ADLS_ACUITY_SCORE: 28
HYGIENE/GROOMING: INDEPENDENT
HYGIENE/GROOMING: HANDWASHING;SHOWER;INDEPENDENT
ORAL_HYGIENE: INDEPENDENT
ADLS_ACUITY_SCORE: 28
LAUNDRY: WITH SUPERVISION
ADLS_ACUITY_SCORE: 28

## 2022-07-30 NOTE — PLAN OF CARE
"Pt was visible in the milieu and withdrawn. Pt was in the dining room writing and doing individual activities. Pt was calm this shift. Pt however requested for prn Zyprexa reporting that he was agitated because \"I lost everything\". Pt took all his medications scheduled at HS. Denied SI/SIB/AH/VH. Ate supper 100% Will continue to monitor.    Problem: Behavioral Health Plan of Care  Goal: Adheres to Safety Considerations for Self and Others  Outcome: Ongoing, Progressing     Problem: Suicidal Behavior  Goal: Suicidal Behavior is Absent or Managed  Outcome: Ongoing, Progressing   Goal Outcome Evaluation:                      "

## 2022-07-30 NOTE — PROGRESS NOTES
07/29/22 2000   Group Therapy Session   Group Attendance attended group session   Time Session Began 2000   Time Session Ended 2045   Total Time patient participated (minutes) 40   Total # Attendees 1   Group Type recreation   Group Topic Covered relaxation techniques   Group Session Detail stress reduction   Patient Response/Contribution cooperative with task   Patient Response Detail Pt actively participated in a structured Therapeutic Recreation group with a focus on leisure participation, socializing, and exercise. Pt participated in the guided exercise for the full duration of the group. Pt followed along, engaged in the guided chair exercise routine and added to the discussion prompts throughout the routine.  Pt was encouraged to use positive imagery with the deep breathing and stretching to foster relaxation, improves focus, and reduce stress.

## 2022-07-30 NOTE — PROGRESS NOTES
07/30/22 1400   Group Therapy Session   Time Session Began 1315   Time Session Ended 1420   Total Time patient participated (minutes) 50   Total # Attendees 4-6   Group Type expressive therapy   Group Topic Covered relaxation techniques;self-care activities   Group Session Detail Relaxation   Patient Response/Contribution cooperative with task   Patient Response Detail MT offered 3 options for group intervention today, and group chose Music Relaxation session.  Cooperatively engaged in afternoon Music Relaxation group to decrease anxiety and promote calm.  Appropriately engaged in session, responding well to the music.   Bright affect.

## 2022-07-30 NOTE — PLAN OF CARE
Patient was intermittently awake requesting snacks and prn Zyprexa 10 mg along with nicotine gum, patient went back to sleep and has been sleeping since, awake for bathroom use and repositioning.   Problem: Sleep Disturbance  Goal: Adequate Sleep/Rest  Outcome: Ongoing, Progressing   Goal Outcome Evaluation:

## 2022-07-30 NOTE — PLAN OF CARE
"Goal Outcome Evaluation:    Plan of Care Reviewed With: patient     Pt is calm, pleasant; affect fairly full range. His mood is \"neutral.\" Pt cont to rate his depression and anxiety 10/10. He has not yet requested meds for his anxiety. He states his concentration is \"poor.\" Pt reports difficulty both getting to, and staying asleep. He is in the milieu, watching tv. Pt's goal for the day is \"to call some family and friends.\"    1402) Pt has been in the milieu, also went to OT. He was concerned that the facility he was perhaps accepted at, did not have an opening recorded for him. Pt was enc to talk to his CM on Monday. He became a bit irritable, but this passed, quickly. Zyprexa *1 given per request, anxiety.              "

## 2022-07-31 PROCEDURE — 250N000013 HC RX MED GY IP 250 OP 250 PS 637

## 2022-07-31 PROCEDURE — 250N000013 HC RX MED GY IP 250 OP 250 PS 637: Performed by: PSYCHIATRY & NEUROLOGY

## 2022-07-31 PROCEDURE — 124N000002 HC R&B MH UMMC

## 2022-07-31 RX ADMIN — PRAZOSIN HYDROCHLORIDE 1 MG: 1 CAPSULE ORAL at 19:20

## 2022-07-31 RX ADMIN — NICOTINE POLACRILEX 4 MG: 4 GUM, CHEWING BUCCAL at 09:11

## 2022-07-31 RX ADMIN — NICOTINE POLACRILEX 4 MG: 4 GUM, CHEWING BUCCAL at 16:37

## 2022-07-31 RX ADMIN — NICOTINE POLACRILEX 4 MG: 4 GUM, CHEWING BUCCAL at 14:29

## 2022-07-31 RX ADMIN — NICOTINE POLACRILEX 4 MG: 4 GUM, CHEWING BUCCAL at 10:21

## 2022-07-31 RX ADMIN — NICOTINE POLACRILEX 4 MG: 4 GUM, CHEWING BUCCAL at 11:12

## 2022-07-31 RX ADMIN — HYDROXYZINE HYDROCHLORIDE 25 MG: 25 TABLET ORAL at 19:20

## 2022-07-31 RX ADMIN — NICOTINE POLACRILEX 4 MG: 4 GUM, CHEWING BUCCAL at 07:58

## 2022-07-31 RX ADMIN — OLANZAPINE 10 MG: 10 TABLET, FILM COATED ORAL at 21:29

## 2022-07-31 RX ADMIN — NICOTINE POLACRILEX 4 MG: 4 GUM, CHEWING BUCCAL at 13:23

## 2022-07-31 RX ADMIN — NICOTINE POLACRILEX 4 MG: 4 GUM, CHEWING BUCCAL at 17:59

## 2022-07-31 RX ADMIN — POLYETHYLENE GLYCOL 3350 17 G: 17 POWDER, FOR SOLUTION ORAL at 07:58

## 2022-07-31 RX ADMIN — MELATONIN TAB 3 MG 3 MG: 3 TAB at 19:20

## 2022-07-31 RX ADMIN — ACETAMINOPHEN 325MG 650 MG: 325 TABLET ORAL at 21:30

## 2022-07-31 ASSESSMENT — ACTIVITIES OF DAILY LIVING (ADL)
ADLS_ACUITY_SCORE: 28
ORAL_HYGIENE: INDEPENDENT
HYGIENE/GROOMING: INDEPENDENT
ADLS_ACUITY_SCORE: 28
LAUNDRY: WITH SUPERVISION
DRESS: STREET CLOTHES;INDEPENDENT
HYGIENE/GROOMING: HANDWASHING;INDEPENDENT
LAUNDRY: WITH SUPERVISION
DRESS: INDEPENDENT
ORAL_HYGIENE: INDEPENDENT
ADLS_ACUITY_SCORE: 28
ADLS_ACUITY_SCORE: 28

## 2022-07-31 NOTE — PLAN OF CARE
"Goal Outcome Evaluation:    Plan of Care Reviewed With: patient     Pt is up for bkfst/lunch. He is drawing a picture for his daughter. His mood is \"the same, good.\" He continues to rate his depression and anxiety both 10/10; appears calm, pleasant, w full range affect. Pt said he would attend any grps held, and is mainly in the milieu; keeps to self. His goal for the day is \"to be patient about waiting for my tx.\"                "

## 2022-07-31 NOTE — PLAN OF CARE
Patient appeared to be sleeping without concerns, repositioned and used the bathroom once, no prn or snacks given during shift, safety checks in place.   Problem: Sleep Disturbance  Goal: Adequate Sleep/Rest  Outcome: Ongoing, Progressing   Goal Outcome Evaluation:

## 2022-07-31 NOTE — PLAN OF CARE
Pt was visible in the milieu. Pt was in the lounge watching tv and did not engage much with peers. Pt was calm and cooperative. Denies suicidal ideation and hallucinations. Pt endorses anxiety 9/10 and depression 9/10. Pt was asking if he could have surgery done on his right biceps. Pt was referred to discuss with team on Monday. Pt was agreeable. Pt took prn Tylenol for the right biceps pain. Will monitor.     Problem: Behavioral Health Plan of Care  Goal: Adheres to Safety Considerations for Self and Others  Outcome: Ongoing, Progressing  Goal: Optimized Coping Skills in Response to Life Stressors  Outcome: Ongoing, Progressing     Problem: Suicidal Behavior  Goal: Suicidal Behavior is Absent or Managed  Outcome: Ongoing, Progressing   Goal Outcome Evaluation:

## 2022-08-01 PROCEDURE — 124N000002 HC R&B MH UMMC

## 2022-08-01 PROCEDURE — 250N000013 HC RX MED GY IP 250 OP 250 PS 637

## 2022-08-01 PROCEDURE — G0177 OPPS/PHP; TRAIN & EDUC SERV: HCPCS

## 2022-08-01 PROCEDURE — U0005 INFEC AGEN DETEC AMPLI PROBE: HCPCS

## 2022-08-01 PROCEDURE — 250N000013 HC RX MED GY IP 250 OP 250 PS 637: Performed by: PSYCHIATRY & NEUROLOGY

## 2022-08-01 PROCEDURE — 99232 SBSQ HOSP IP/OBS MODERATE 35: CPT | Mod: GC | Performed by: PSYCHIATRY & NEUROLOGY

## 2022-08-01 RX ORDER — CETIRIZINE HYDROCHLORIDE 10 MG/1
10 TABLET ORAL DAILY
Status: DISCONTINUED | OUTPATIENT
Start: 2022-08-01 | End: 2022-08-29 | Stop reason: HOSPADM

## 2022-08-01 RX ADMIN — HYDROXYZINE HYDROCHLORIDE 25 MG: 25 TABLET ORAL at 18:48

## 2022-08-01 RX ADMIN — NICOTINE POLACRILEX 4 MG: 4 GUM, CHEWING BUCCAL at 18:49

## 2022-08-01 RX ADMIN — NICOTINE POLACRILEX 4 MG: 4 GUM, CHEWING BUCCAL at 12:52

## 2022-08-01 RX ADMIN — NICOTINE POLACRILEX 4 MG: 4 GUM, CHEWING BUCCAL at 11:08

## 2022-08-01 RX ADMIN — POLYETHYLENE GLYCOL 3350 17 G: 17 POWDER, FOR SOLUTION ORAL at 08:16

## 2022-08-01 RX ADMIN — MELATONIN TAB 3 MG 3 MG: 3 TAB at 19:17

## 2022-08-01 RX ADMIN — NICOTINE POLACRILEX 4 MG: 4 GUM, CHEWING BUCCAL at 16:34

## 2022-08-01 RX ADMIN — NICOTINE POLACRILEX 4 MG: 4 GUM, CHEWING BUCCAL at 09:42

## 2022-08-01 RX ADMIN — CETIRIZINE HYDROCHLORIDE 10 MG: 10 TABLET, FILM COATED ORAL at 14:47

## 2022-08-01 RX ADMIN — NICOTINE POLACRILEX 4 MG: 4 GUM, CHEWING BUCCAL at 14:47

## 2022-08-01 RX ADMIN — OLANZAPINE 10 MG: 10 TABLET, FILM COATED ORAL at 20:50

## 2022-08-01 RX ADMIN — PRAZOSIN HYDROCHLORIDE 1 MG: 1 CAPSULE ORAL at 19:17

## 2022-08-01 RX ADMIN — NICOTINE POLACRILEX 4 MG: 4 GUM, CHEWING BUCCAL at 20:23

## 2022-08-01 RX ADMIN — NICOTINE POLACRILEX 4 MG: 4 GUM, CHEWING BUCCAL at 08:15

## 2022-08-01 ASSESSMENT — ACTIVITIES OF DAILY LIVING (ADL)
ADLS_ACUITY_SCORE: 28
DRESS: INDEPENDENT
ADLS_ACUITY_SCORE: 28
ADLS_ACUITY_SCORE: 28
HYGIENE/GROOMING: INDEPENDENT;HANDWASHING
ADLS_ACUITY_SCORE: 28
ORAL_HYGIENE: INDEPENDENT
ADLS_ACUITY_SCORE: 28

## 2022-08-01 NOTE — PLAN OF CARE
Patient appeared to be sleeping awake intermittently for bathroom use and repositioning, no prn or snacks given during this shift, safety checks in place without concerns.   Problem: Sleep Disturbance  Goal: Adequate Sleep/Rest  Outcome: Ongoing, Progressing   Goal Outcome Evaluation:

## 2022-08-01 NOTE — PLAN OF CARE
Pt had overall calm shift. He has been cooperative, polite and patient when making requests from staff. He has required nor requested any PRN's except nicotine gum. No behavioral issues or aggression. No safety concerns at this time.    Problem: Behavioral Health Plan of Care  Goal: Plan of Care Review  Recent Flowsheet Documentation  Taken 8/1/2022 1519 by Marci Almanza  Plan of Care Reviewed With: patient

## 2022-08-01 NOTE — PLAN OF CARE
[facility] Occupational Therapy Group Intervention Note     08/01/22 1332   Group Therapy Session   Group Attendance attended group session   Time Session Began 1115   Time Session Ended 1200   Total Time patient participated (minutes) 40   Total # Attendees 6   Group Type task skill;life skill   Group Topic Covered coping skills/lifestyle management;structured socialization   Group Session Detail clinic - coping skill exploration, creative expression within personally meaningful activities, and observation of social, cognitive, and kinesthetic performance skills   Patient Response/Contribution cooperative with task;offered helpful suggestions to peers;listened actively;organized   Patient Response Detail IND to initiate and complete various creative expression projects. Congruent affect. Social with peers.      Opal Jaeger OT on 8/1/2022 at 1:33 PM

## 2022-08-01 NOTE — PLAN OF CARE
"[facility] Occupational Therapy Group Intervention Note     08/01/22 1322   Group Therapy Session   Group Attendance attended group session   Time Session Began 1015   Time Session Ended 1100   Total Time patient participated (minutes) 45   Total # Attendees 5   Group Type psychoeducation   Group Topic Covered coping skills/lifestyle management;relaxation techniques   Group Session Detail Chair Yoga. Purpose: Occupation-based coping skill exploration group through mindful movement to facilitate stress management, awakening and/or relaxation. Education was provided on the use of stretching, exercise, and meditation practice as a coping tool within daily/weekly routine.    Patient Response/Contribution cooperative with task;discussed personal experience with topic;listened actively   Patient Response Detail Reported previous experience with movement/yoga. Independently followed and engaged in all of group. Verbalized feeling \"better\" at the end of group and interest in additional mind-body interventions. Expressed intent to continue this activity for increased wellness/recovery post-discharge.      Opal Jaeger OT on 8/1/2022 at 1:22 PM      "

## 2022-08-01 NOTE — PLAN OF CARE
[facility] Occupational Therapy Group Intervention Note     08/01/22 1444   Group Therapy Session   Group Attendance attended group session   Time Session Began 1400   Time Session Ended 1445   Total Time patient participated (minutes) 45   Total # Attendees 3   Group Type psychoeducation   Group Topic Covered coping skills/lifestyle management;relapse prevention;problem-solving   Group Session Detail General health and coping group focused on the '8 Dimensions of Wellness'. Discussion-based group was used to facilitate insight development on holistic whole-person wellness related to occupational performance and satisfaction.    Patient Response/Contribution cooperative with task;discussed personal experience with topic;listened actively;offered helpful suggestions to peers   Patient Response Detail Engaged and insightful. Initiated change talk and identified barriers (negative people and places of use) and supports (reflection and healthy supports) to increased wellness and recovery.      Opal Jaeger OT on 8/1/2022 at 2:45 PM

## 2022-08-01 NOTE — PROGRESS NOTES
----------------------------------------------------------------------------------------------------------  [facility]  Psychiatric Progress Note  Hospital Day #16    See Mendenhall MRN# [id number]   Age: 31 year old YOB: 1990   Date of Admission: 7/14/2022     Subjective   The patient was discussed with the treatment team and chart notes were reviewed.      Identifier: See Mendenhall is a 31 year old gentleman with a past psychiatric diagnosis of  Bipolar 2 disorder, substance use disorder, depressive disorder and anxiety disorder, PTSD and ADHD.     Sleep:  7 hours (08/01/22 0600)   Prescribed Medications: Taken as prescribed  PRN Psychiatric Medications: acetaminophen, albuterol, alum & mag hydroxide-simethicone, hydrocortisone, hydrOXYzine, lidocaine 4%, nicotine, OLANZapine, QUEtiapine    Overnight Nursing Notes/Staff Report:  No acute concerns overnight. No behavioral concerns this AM. Slept 7 hours.  Per RN has been, Mr. Mendenhall observed in milieu reading; pleasant and without concerns.    Patient interview:  See Mendenhall was interviewed in the conference room. Appears comfortable dressed in street clothes, he endorses feeling good about having his own clothing. He reports the ZYPREXA has been working well; he has no complaints/concerns from over the weekend. Mr. Mendenhall states that he doesn't want to be on too many medications but has been on so many over the years that it is hard for him to discern which medications work and which are not necessary. Notes that he is [employer] and believes [employer] is bad. Mr. Mendenhall currently feels adjusted on his medications.     Mr. Mendenhall expressed significant frustration with ongoing commitment and delay in CARE referral, as well as inability to reach his  (Agatha). He remains defensive about circumstances surrounding his commitment including police warrants, hospitalization at [facility], legal problems, and substance use relapse. Meeting planned with  (Agatha) today at 1430; seems to be in good spirits in anticipation of this, and plans to ask her whether she would be in support of a facility that is not locked. Wishes to determine where the boundary is but states that if CARE is the only facility permitted, he will be satisfied. Treatment team reassured that if  does not attend meeting we will be in contact.     Mr. Mendenhall complains about seasonal allergies that are making his asthma worse. Requests information about a steroid inhaler, but explained this may not be a good fit given no previous diagnosis of COPD/chronic lung disease. Mr. Mendenhall acknowledges understanding and requests cetirizine PRN for seasonal allergies.  -------------------------------------------------------------------------------------------------------------------------  Suicidal ideation: denies current or recent suicidal ideation or behaviors.  Homicidal ideation: denies current or recent homicidal ideation or behaviors.  Psychotic symptoms: Patient denies AH, VH, paranoia, delusions.     Medication side effects reported: No significant side effects.  Acute medical concerns: none     ROS   ROS was negative unless noted above.     Objective   Vitals:  Temp: 98.1  F (36.7  C) (Temp  Min: 98.1  F (36.7  C)  Max: 98.8  F (37.1  C))  Resp: 16 (Resp  Min: 16  Max: 16)  SpO2: 98 % (SpO2  Min: 98 %  Max: 98 %)  Pulse: 77 (Pulse  Min: 77  Max: 77)  BP: 115/68  Systolic (24hrs), Av , Min:115 , Max:115   Diastolic (24hrs), Av, Min:68, Max:68    Mental Status Examination:  Oriented to:  Person/Self, Situation, Date and location  General: Awake and Alert  Appearance:  appears stated age, Posture is relaxed in chair, Grooming is adequate and Dressed in street clothing  Behavior:  calm, cooperative and engaged  Eye Contact:  adequate  Psychomotor:  no abnormal motor symptoms appreciated; no  catatonia present  Speech:  appropriate volume/tone, talkative and with good articulation  Language: Fluent in English with appropriate syntax and vocabulary.  Mood:  Positive. Feels safe and not threatened after transfer to station 20.  Affect:  congruent with mood, labile, irritable and anxious  Thought Process:  coherent, linear and logical  Thought Content: No SI/HI/AH/VH; No apparent delusions  Associations:  intact  Insight:  fair due to recognizing need to go to CD, but highly externalizing regarding circumstances  Judgment:  fair due to willingness to engage with treatment and go to CD  Attention Span: adequate for conversation  Concentration:  grossly intact  Recent and Remote Memory:  not formally assessed  Fund of Knowledge: average     Allergies     Allergies   Allergen Reactions     Other Drug Allergy (See Comments)      Fake metal        Labs & Imaging   No results found for this or any previous visit (from the past 24 hour(s)).     Assessment   Diagnostic Impression:  See Mendenhall is a 31 year old gentleman with a past psychiatric diagnosis of  Bipolar 2 disorder, substance use disorder, depressive disorder and anxiety disorder, PTSD and ADHD. He was admitted from the ER on 07/16/2022 where he was brought by the police due to concern for psychosis and SI with plan. This in the context of methamphetamine and cocaine use, unclear regarding medication non-adherence, he has significant history of substance use and psychosocial stressors including housing instability, unemployment, legal issues, trauma and chronic mental health issues.     He was brought to the ED with SI, erratic and impulsive behaviors and psychosis (paranoid delusions and disorganized behavior/thought process) in the context of methamphetamine and cocaine use.  His last psychiatric hospitalization was in April/2022 at [facility] for psychosis in context of substance use.  He is currently followed by PCP Erinn Maradiaga  at [facility]. Current psychosocial stressors include legal issues, trauma, chronic mental health issues, family dynamics and medical issues which he has been coping with by using substances, acting out to self and acting out to others.  Patient's support system includes sister Samaria and friend Wilbert.      Substance use does appear to be playing a contributing role in the patient's presentation. Medical history does not appear to be significant, although chart review indicates history of chronic back pain, prescribed anabolic steroids that may be playing a role in presentation. In utero exposure and developmental delay need to be assessed.       Psychosocially history of trauma in childhood, long history of substance use, currently being unemployed and experiencing housing insecurity, legal issues, not being able to see his daughter represent both precipitating and perpetuating factors contributing to presentation.      The MSE is notable for some persisting paranoia, mood lability, being guarded, tangentiality and limited attention span/concentration, hyper-talkativeness difficult to interrupt speech. He denies self injurious behaviors. His reported symptoms of VH, paranoid delusions and grossly disorganized thought process in the context of methamphetamine and cocaine use are suggestive of substance-induced psychosis, although definitive diagnosis is still in evolution and further collateral information from family/ outpatient provider is needed at this time; differential includes primary psychotic disorder exacerbated by substance use, PTSD, Bipolar Disorder, schizoaffective disorder.  Additionally, he has traits of impulsive behaviors which interfere with his ability to adhere with the treatment plan.       Optimization of medications to target these symptom clusters in addition to evaluation of adequate community supports will be targets for treatment during this admission.      Pt has history  of previous attempts and substance use representing heightened acute risk for self/others patient warrants inpatient psychiatric hospitalization to maintain his safety until door to door treatment can be arranged. Disposition pending clinical stabilization, medication optimization and development of an appropriate discharge plan, including CD treatment referral upon discharge.     Principal Diagnoses:   Substance induced psychosis, now resolved  Amphetamine Use Disorder, severe  Cocaine Use Disorder, severe  Sedative Hypnotic Use Disorder, severe  Opiate Use Disorder, unspecified severity  Major depressive disorder, recurrent episode, moderate   Generalized anxiety disorder    Psychiatric & Medical course:  See Mendenhall was admitted to [facility] on a 72 hour hold, transferred to Station 12 following a conflict with another patient. Patient in question has been removed from Station 20, so now Mr. Mendenhall has returned to Station 20. His PTA prazosin was continued.      See Mendenhall continued to meet criteria for inpatient hospitalization medication optimization, inpatient stabilization, and appropriate discharge planning.      7/19: added Seroquel 50 mg BEDTIME; 25 mg TID PRN   7/20: Quetiapine (Seroquel) increased to 100 mg, BEDTIME scheduled. Keep 25 mg TID PRN  7/20: melatonin 3mg scheduled per patient request  7/25: Pending confirmation of CARE referral and status on list with outpatient CM. If anticipated to be a prolonged wait for placement, will consider less restrictive options for CD.  7/26: Ongoing issues with outpatient CM Agatha not returning messages or answering calls from team or patient. Message left with CM supervisor given persistent issues reaching CM.  7/27: OP CM strongly advocating for CARE placement given patient's history of several failed treatments and violent/aggressive behaviors in context of substance use. OP CM did confirm that she placed CARE referral. Pt continues to voice  frustration about length of stay and delay in CARE referral as he was informed that the referral was placed over one week ago.   7/28: Returned to Station 20 from Station 12. CARE placement planned, likely will be available within two weeks. Mr. Mendenhall requests audience with  (Agatha) to explain circumstances of relapse and admission, even if it will not change the course of treatment or discharge to CARE facility, still wishes to clarify details. Changed scheduled SEROQUEL to ZYPREXA 10 mg at bedtime. SEROQUEL ordered PRN  7/29: CARE placement planned, but Mr. Mendenhall continues to request audience with  (Agatha). Should have been placed on list for CARE facility previously as he is on hospital day 13. Will ensure this is done today.  scheduled to visit unit and speak with Mr. Mendenhall on 8/1. Persistent anxiety, request for valium denied due to CD history and need to maintain CARE eligibility. Increased PRN ZYPREXA to accommodate maximum total dose of 40 mg/day (along with scheduled). Complains of shoulder pain s/p rotator cuff surgery and biceps tear in 12/2021, request for tramadol denied due to CD history. Ordered PRN naproxen and PRN lidocaine cream instead.  8/1: Seasonal allergies; ordered PRN Cetirizine 10 mg      Plan   Today's Changes:     Ordered Cetirizine 10 mg PRN  _______________________________________________________________________  Psychiatric Management:    Prazosin 1mg at bedtime    Quetiapine 100mg at bedtime    Civil commitment with Das    Additional Planning:    Continue to monitor for and stabilize: irritable, psychosis and substance use    Patient will be treated in therapeutic milieu with appropriate individual and group therapies as described.    Scheduled Medications (summary):  Current Facility-Administered Medications   Medication Dose Route Frequency     cetirizine  10 mg Oral Daily     melatonin  3 mg Oral At Bedtime     OLANZapine  10 mg Oral  At Bedtime     polyethylene glycol  17 g Oral Daily     prazosin  1 mg Oral At Bedtime     PRN Medications (summary):  Current Facility-Administered Medications   Medication Dose Route Frequency     acetaminophen  650 mg Oral Q4H PRN     albuterol  2 puff Inhalation Q6H PRN     alum & mag hydroxide-simethicone  30 mL Oral Q4H PRN     hydrocortisone   Topical BID PRN     hydrOXYzine  25 mg Oral Q4H PRN     lidocaine 4%   Topical BID PRN     naproxen  250 mg Oral TID PRN     nicotine  4 mg Buccal Q1H PRN     OLANZapine  5-10 mg Oral TID PRN    Or     OLANZapine  10 mg Intramuscular TID PRN     QUEtiapine  100 mg Oral At Bedtime PRN     QUEtiapine  25-50 mg Oral TID PRN     Legal Status:    Das    Committed     SIO:    No    Pt has not required locked seclusion or restraints in the past 24 hours to maintain safety, please refer to RN documentation for further details.    Safety Assessment:   Behavioral Orders   Procedures     Assault precautions     Code 1 - Restrict to Unit     Routine Programming     As clinically indicated     Status 15     Every 15 minutes.     Disposition:    Reason for ongoing admission: Civil commitment pending placement for CD for direct transfer    Discharge location: TBD, pending clinical stabilization, medication optimization, and formulation of a safe discharge plan. Likely CARE, possibly in the next 14 days.      Discharge Medications: not ordered    Follow-up Appointments: not scheduled  ____________________________________________________________________  Pertinent Medical diagnoses and management:    None  ____________________________________________________________________  Patient seen and discussed with my resident physician, Dr. Theresa Reese MD and attending physician Dr. Galina De Jesus MD who are in agreement with my assessment and plan.    Nick Coates,       The patient was seen and discussed with the medical student. I concur with the student's note.    Theresa  FAHAD Reese  PGY1 Psychiatry Resident    Attestation:  This patient has been seen and evaluated by me, Galina De Jesus MD.  I have discussed this patient with the house staff team including the resident and medical student and I agree with the findings and plan in this note.    I have reviewed today's vital signs, medications, labs and imaging. Galina De Jesus MD , PhD.

## 2022-08-01 NOTE — PLAN OF CARE
Pt was visible in the milieu. Pt interacted with peers moderately while in the lounge watching tv. Pt took a shower this shift. Presented with flat affect. Pt was calm however endorsed anxiety 9/10 and depression 9/10 and prn Hydroxyzine was administered per pt's request. Denied suicidal ideations and hallucinations. Pt took prn Tylenol for right biceps pain, and Melatonin for sleep at HS.  Ate supper 100%.     Problem: Behavioral Health Plan of Care  Goal: Adheres to Safety Considerations for Self and Others  Outcome: Ongoing, Progressing     Problem: Suicidal Behavior  Goal: Suicidal Behavior is Absent or Managed  Outcome: Ongoing, Progressing   Goal Outcome Evaluation:

## 2022-08-01 NOTE — PLAN OF CARE
Assessment/Intervention/Current Symtoms and Care Coordination  The patient's care was discussed with the treatment team and chart notes were reviewed.  Patient had an uneventful weekend- he complains of depression and anxiety - likely worse due to his being in the hospital with no discharge date as yet. .  Has been attending groups, social with peers.  [facility] CM scheduled to visit patient tomorrow at 1:00pm.      Discharge Plan or Goal  CARE facility     Barriers to Discharge   Further psychiatric  Stabilization - medication mgmt  Placement to CARE facility due to h/o repeatedly elopement from tx     Referral Status  [facility] CM is placing patient on the CARE wait list     Legal Status     Civil commitment with Das order

## 2022-08-02 LAB — SARS-COV-2 RNA RESP QL NAA+PROBE: NEGATIVE

## 2022-08-02 PROCEDURE — G0177 OPPS/PHP; TRAIN & EDUC SERV: HCPCS

## 2022-08-02 PROCEDURE — 250N000013 HC RX MED GY IP 250 OP 250 PS 637: Performed by: PSYCHIATRY & NEUROLOGY

## 2022-08-02 PROCEDURE — 250N000013 HC RX MED GY IP 250 OP 250 PS 637

## 2022-08-02 PROCEDURE — 124N000002 HC R&B MH UMMC

## 2022-08-02 PROCEDURE — 99232 SBSQ HOSP IP/OBS MODERATE 35: CPT | Mod: GC | Performed by: PSYCHIATRY & NEUROLOGY

## 2022-08-02 RX ADMIN — ACETAMINOPHEN 325MG 650 MG: 325 TABLET ORAL at 09:57

## 2022-08-02 RX ADMIN — NICOTINE POLACRILEX 4 MG: 4 GUM, CHEWING BUCCAL at 22:22

## 2022-08-02 RX ADMIN — MELATONIN TAB 3 MG 3 MG: 3 TAB at 21:30

## 2022-08-02 RX ADMIN — NICOTINE POLACRILEX 4 MG: 4 GUM, CHEWING BUCCAL at 14:51

## 2022-08-02 RX ADMIN — NICOTINE POLACRILEX 4 MG: 4 GUM, CHEWING BUCCAL at 17:15

## 2022-08-02 RX ADMIN — ACETAMINOPHEN 325MG 650 MG: 325 TABLET ORAL at 17:16

## 2022-08-02 RX ADMIN — NICOTINE POLACRILEX 4 MG: 4 GUM, CHEWING BUCCAL at 12:32

## 2022-08-02 RX ADMIN — PRAZOSIN HYDROCHLORIDE 1 MG: 1 CAPSULE ORAL at 21:30

## 2022-08-02 RX ADMIN — NICOTINE POLACRILEX 4 MG: 4 GUM, CHEWING BUCCAL at 09:57

## 2022-08-02 RX ADMIN — POLYETHYLENE GLYCOL 3350 17 G: 17 POWDER, FOR SOLUTION ORAL at 08:42

## 2022-08-02 RX ADMIN — NICOTINE POLACRILEX 4 MG: 4 GUM, CHEWING BUCCAL at 23:46

## 2022-08-02 RX ADMIN — NICOTINE POLACRILEX 4 MG: 4 GUM, CHEWING BUCCAL at 08:42

## 2022-08-02 RX ADMIN — NAPROXEN 250 MG: 250 TABLET ORAL at 22:22

## 2022-08-02 RX ADMIN — OLANZAPINE 10 MG: 10 TABLET, FILM COATED ORAL at 21:31

## 2022-08-02 RX ADMIN — CETIRIZINE HYDROCHLORIDE 10 MG: 10 TABLET, FILM COATED ORAL at 08:42

## 2022-08-02 RX ADMIN — HYDROXYZINE HYDROCHLORIDE 25 MG: 25 TABLET ORAL at 23:46

## 2022-08-02 ASSESSMENT — ACTIVITIES OF DAILY LIVING (ADL)
ADLS_ACUITY_SCORE: 28
ORAL_HYGIENE: INDEPENDENT
ADLS_ACUITY_SCORE: 28
HYGIENE/GROOMING: INDEPENDENT
DRESS: INDEPENDENT
ADLS_ACUITY_SCORE: 28
LAUNDRY: WITH SUPERVISION
ADLS_ACUITY_SCORE: 28

## 2022-08-02 NOTE — PLAN OF CARE
Pt appears to have slept for 7 hours. No PRN given or requested . No concerns noted this shift ; will continue to monitor and offer support.     Problem: Sleep Disturbance  Goal: Adequate Sleep/Rest  Outcome: Ongoing, Progressing   Goal Outcome Evaluation:

## 2022-08-02 NOTE — PLAN OF CARE
"Occupational Therapy Group Note:     08/02/22 1400   Group Therapy Session   Group Attendance attended group session   Time Session Began 1315   Time Session Ended 1415   Total Time patient participated (minutes) 60   Total # Attendees 5-7   Group Type life skill   Group Topic Covered emotions/expression;structured socialization;cognitive activities   Group Session Detail music & emotions discussion   Patient Response/Contribution cooperative with task;discussed personal experience with topic;expressed understanding of topic;listened actively;organized   Patient Response Detail Pt actively participated in a structured occupational therapy group with a focus on connecting song titles to life events and personal feelings. Using a list of song titles, pt identified Comfortably Numb, [place] Dreams, Can't Buy Me Love, Free Falling (connected this to his brother who passed away liking Korey Portillo), and Walking on Sunshine as song titles that relate to his life. Pt also independently identified \"Daylight by [place]\" as a song that he personally relates to. Pt then completed a visual scanning task while listening to identified songs to facilitate focus and organization. Demonstrated good attention to task. Calmly discussed feeling frustrated with the length of his hospitalization, though receptive to validation and encouragement. Calm and cooperative.       "

## 2022-08-02 NOTE — PLAN OF CARE
Pt has been visible in the milieu, he is social with peers and attends groups that are offered. Pt has been compliant with care and has not had any behavioral concerns. He endorses mild anxiety and denies all other mental health symptoms. Pt met with his outpatient  today and continues to be frustrated with placement situation. He is med compliant without issue, no other concerns at this time.    Problem: Suicidal Behavior  Goal: Suicidal Behavior is Absent or Managed  Outcome: Ongoing, Progressing   Goal Outcome Evaluation:

## 2022-08-02 NOTE — PROGRESS NOTES
"    ----------------------------------------------------------------------------------------------------------  [facility]  Psychiatric Progress Note  Hospital Day #17    See Mendenhall MRN# [id number]   Age: 31 year old YOB: 1990   Date of Admission: 7/14/2022     Subjective   The patient was discussed with the treatment team and chart notes were reviewed.      Identifier: See Mendenhall is a 31 year old gentleman with a past psychiatric diagnosis of  Bipolar 2 disorder, substance use disorder, depressive disorder and anxiety disorder, PTSD and ADHD.     Sleep:  7 hours (08/02/22 0700)   Prescribed Medications: Taken as prescribed  PRN Psychiatric Medications: acetaminophen, albuterol, alum & mag hydroxide-simethicone, hydrocortisone, hydrOXYzine, lidocaine 4%, nicotine, OLANZapine, QUEtiapine    Overnight Nursing Notes/Staff Report:  No acute concerns overnight. No behavioral concerns this AM. Slept 7 hours.  Per RN has been, Mr. Mendenhall observed in milieu reading; pleasant and without concerns.    Patient interview:  See Mendenhall was interviewed in the conference room. Appears comfortable dressed in street clothes, he endorses feeling well. He reports the ZYPREXA has been working well; he has no complaints/concerns from over the weekend. Mr. Mendenhall opens the interview by providing new insight into his situation: he states \"I know why I am here, I was doing coke, not taking my medications...\" and follows up several times by stating \"I am embarrassed,\" expressing remorse for his behavior for the first time this hospitalizations. He states that he wants to get better, that he wants to get treatment, and that in the past he has been frustrated with his situation and has taken it out on others including the care team. Explains that prior to his hospitalization he  from his fiance, started drinking, taking drugs, and lost control to the point he was " running through traffic. He reiterates his embarrassment and remorse. Finally, Mr. Mendenhall expresses the realization that he is in control of his thoughts and is ultimately responsible for the decision to have good days or bad days.     Care team emphasized that Mr. Mendenhall has made marked improvements, that he should consider this time as progress toward sobriety. He stated the goal of getting better enough to be able to see his daughter.    Mr. Mendenhall expressed that he has come to terms with the likelihood he will need treatment, most likely in a locked CARE facility. His  did not visit yesterday but is scheduled to visit station 20 at 1 PM today.    Mr. Mendenhall does not have any requests or complaints today.  -------------------------------------------------------------------------------------------------------------------------  Suicidal ideation: denies current or recent suicidal ideation or behaviors.  Homicidal ideation: denies current or recent homicidal ideation or behaviors.  Psychotic symptoms: Patient denies AH, VH, paranoia, delusions.     Medication side effects reported: No significant side effects.  Acute medical concerns: none     ROS   ROS was negative unless noted above.     Objective   Vitals:  Temp: 98.7  F (37.1  C) (Temp  Min: 98.1  F (36.7  C)  Max: 98.7  F (37.1  C))  Resp: 16 (Resp  Min: 16  Max: 16)  SpO2: 97 % (SpO2  Min: 97 %  Max: 98 %)  Pulse: 75 (Pulse  Min: 75  Max: 75)  BP: 111/65  Systolic (24hrs), Av , Min:111 , Max:111   Diastolic (24hrs), Av, Min:65, Max:65    Mental Status Examination:  Oriented to:  Person/Self, Situation, Date and location  General: Awake and Alert  Appearance:  appears stated age, Posture is relaxed in chair, Grooming is adequate and Dressed in street clothing  Behavior:  calm, cooperative and engaged  Eye Contact:  adequate  Psychomotor:  no abnormal motor symptoms appreciated; no catatonia present  Speech:  appropriate  volume/tone, talkative and with good articulation  Language: Fluent in English with appropriate syntax and vocabulary.  Mood:  Positive. Feels safe and not threatened after transfer to station 20.  Affect:  congruent with mood, labile, irritable and anxious  Thought Process:  coherent, linear and logical  Thought Content: No SI/HI/AH/VH; No apparent delusions  Associations:  intact  Insight:  fair due to recognizing need to go to CD, but highly externalizing regarding circumstances  Judgment:  fair due to willingness to engage with treatment and go to CD  Attention Span: adequate for conversation  Concentration:  grossly intact  Recent and Remote Memory:  not formally assessed  Fund of Knowledge: average     Allergies     Allergies   Allergen Reactions     Other Drug Allergy (See Comments)      Fake metal        Labs & Imaging     Results for orders placed or performed during the hospital encounter of 07/14/22 (from the past 24 hour(s))   Asymptomatic COVID-19 Virus (Coronavirus) by PCR Nasopharyngeal    Specimen: Nasopharyngeal; Swab   Result Value Ref Range    SARS CoV2 PCR Negative Negative, Testing sent to reference lab. Results will be returned via unsolicited result    Narrative    Testing was performed using the Xpert Xpress SARS-CoV-2 Assay on the  [employer]. Additional information about  this Emergency Use Authorization (EUA) assay can be found via the Lab  Guide. This test should be ordered for the detection of SARS-CoV-2 in  individuals who meet SARS-CoV-2 clinical and/or epidemiological  criteria. Test performance is unknown in asymptomatic patients. This  test is for in vitro diagnostic use under the FDA EUA for  laboratories certified under CLIA to perform high complexity testing.  This test has not been FDA cleared or approved. A negative result  does not rule out the presence of PCR inhibitors in the specimen or  target RNA in concentration below the limit of detection for  the  assay. The possibility of a false negative should be considered if  the patient's recent exposure or clinical presentation suggests  COVID-19. This test was validated by the [facility]. This laboratory is certified under  the Clinical Laboratory Improvement Amendments of 1988 (CLIA-88) as  qualified to perform high complexity laboratory testing.          Assessment   Diagnostic Impression:  See Mendenhall is a 31 year old gentleman with a past psychiatric diagnosis of  Bipolar 2 disorder, substance use disorder, depressive disorder and anxiety disorder, PTSD and ADHD. He was admitted from the ER on 07/16/2022 where he was brought by the police due to concern for psychosis and SI with plan. This in the context of methamphetamine and cocaine use, unclear regarding medication non-adherence, he has significant history of substance use and psychosocial stressors including housing instability, unemployment, legal issues, trauma and chronic mental health issues.     He was brought to the ED with SI, erratic and impulsive behaviors and psychosis (paranoid delusions and disorganized behavior/thought process) in the context of methamphetamine and cocaine use.  His last psychiatric hospitalization was in April/2022 at [facility] for psychosis in context of substance use.  He is currently followed by PCP Erinn Maradiaga at [facility]. Current psychosocial stressors include legal issues, trauma, chronic mental health issues, family dynamics and medical issues which he has been coping with by using substances, acting out to self and acting out to others.  Patient's support system includes sister Samaria and friend Wilbert.      Substance use does appear to be playing a contributing role in the patient's presentation. Medical history does not appear to be significant, although chart review indicates history of chronic back pain, prescribed anabolic steroids  that may be playing a role in presentation. In utero exposure and developmental delay need to be assessed.       Psychosocially history of trauma in childhood, long history of substance use, currently being unemployed and experiencing housing insecurity, legal issues, not being able to see his daughter represent both precipitating and perpetuating factors contributing to presentation.      The MSE is notable for some persisting paranoia, mood lability, being guarded, tangentiality and limited attention span/concentration, hyper-talkativeness difficult to interrupt speech. He denies self injurious behaviors. His reported symptoms of VH, paranoid delusions and grossly disorganized thought process in the context of methamphetamine and cocaine use are suggestive of substance-induced psychosis, although definitive diagnosis is still in evolution and further collateral information from family/ outpatient provider is needed at this time; differential includes primary psychotic disorder exacerbated by substance use, PTSD, Bipolar Disorder, schizoaffective disorder.  Additionally, he has traits of impulsive behaviors which interfere with his ability to adhere with the treatment plan.       Optimization of medications to target these symptom clusters in addition to evaluation of adequate community supports will be targets for treatment during this admission.      Pt has history of previous attempts and substance use representing heightened acute risk for self/others patient warrants inpatient psychiatric hospitalization to maintain his safety until door to door treatment can be arranged. Disposition pending clinical stabilization, medication optimization and development of an appropriate discharge plan, including CD treatment referral upon discharge.     Principal Diagnoses:   Substance induced psychosis, now resolved  Amphetamine Use Disorder, severe  Cocaine Use Disorder, severe  Sedative Hypnotic Use Disorder,  severe  Opiate Use Disorder, unspecified severity  Major depressive disorder, recurrent episode, moderate   Generalized anxiety disorder    Psychiatric & Medical course:  See Mendenhall was admitted to [facility] on a 72 hour hold, transferred to Station 12 following a conflict with another patient. Patient in question has been removed from Station 20, so now Mr. Mendenhall has returned to Station 20. His PTA prazosin was continued.      See Mendenhall continued to meet criteria for inpatient hospitalization medication optimization, inpatient stabilization, and appropriate discharge planning.      7/19: added Seroquel 50 mg BEDTIME; 25 mg TID PRN   7/20: Quetiapine (Seroquel) increased to 100 mg, BEDTIME scheduled. Keep 25 mg TID PRN  7/20: melatonin 3mg scheduled per patient request  7/25: Pending confirmation of CARE referral and status on list with outpatient CM. If anticipated to be a prolonged wait for placement, will consider less restrictive options for CD.  7/26: Ongoing issues with outpatient CM Agatha not returning messages or answering calls from team or patient. Message left with CM supervisor given persistent issues reaching CM.  7/27: OP CM strongly advocating for CARE placement given patient's history of several failed treatments and violent/aggressive behaviors in context of substance use. OP CM did confirm that she placed CARE referral. Pt continues to voice frustration about length of stay and delay in CARE referral as he was informed that the referral was placed over one week ago.   7/28: Returned to Station 20 from Station 12. CARE placement planned, likely will be available within two weeks. Mr. Mendenhall requests audience with  (Agatha) to explain circumstances of relapse and admission, even if it will not change the course of treatment or discharge to CARE facility, still wishes to clarify details. Changed scheduled SEROQUEL to ZYPREXA 10 mg at bedtime. SEROQUEL ordered PRN  7/29:  CARE placement planned, but Mr. Mendenhall continues to request audience with  (Agatha). Should have been placed on list for CARE facility previously as he is on hospital day 13. Will ensure this is done today.  scheduled to visit unit and speak with Mr. Mendenhall on 8/1. Persistent anxiety, request for valium denied due to CD history and need to maintain CARE eligibility. Increased PRN ZYPREXA to accommodate maximum total dose of 40 mg/day (along with scheduled). Complains of shoulder pain s/p rotator cuff surgery and biceps tear in 12/2021, request for tramadol denied due to CD history. Ordered PRN naproxen and PRN lidocaine cream instead.  8/1: Seasonal allergies; ordered PRN Cetirizine 10 mg      Plan   Today's Changes:     No changes.  _______________________________________________________________________  Psychiatric Management:    Prazosin 1mg at bedtime    Quetiapine 100mg at bedtime    Civil commitment with Das    Additional Planning:    Continue to monitor for and stabilize: irritable, psychosis and substance use    Patient will be treated in therapeutic milieu with appropriate individual and group therapies as described.    Scheduled Medications (summary):  Current Facility-Administered Medications   Medication Dose Route Frequency     cetirizine  10 mg Oral Daily     melatonin  3 mg Oral At Bedtime     OLANZapine  10 mg Oral At Bedtime     polyethylene glycol  17 g Oral Daily     prazosin  1 mg Oral At Bedtime     PRN Medications (summary):  Current Facility-Administered Medications   Medication Dose Route Frequency     acetaminophen  650 mg Oral Q4H PRN     albuterol  2 puff Inhalation Q6H PRN     alum & mag hydroxide-simethicone  30 mL Oral Q4H PRN     hydrocortisone   Topical BID PRN     hydrOXYzine  25 mg Oral Q4H PRN     lidocaine 4%   Topical BID PRN     naproxen  250 mg Oral TID PRN     nicotine  4 mg Buccal Q1H PRN     OLANZapine  5-10 mg Oral TID PRN    Or     OLANZapine   10 mg Intramuscular TID PRN     QUEtiapine  100 mg Oral At Bedtime PRN     QUEtiapine  25-50 mg Oral TID PRN     Legal Status:    Das    Committed     SIO:    No    Pt has not required locked seclusion or restraints in the past 24 hours to maintain safety, please refer to RN documentation for further details.    Safety Assessment:   Behavioral Orders   Procedures     Assault precautions     Code 1 - Restrict to Unit     Routine Programming     As clinically indicated     Status 15     Every 15 minutes.     Disposition:    Reason for ongoing admission: Civil commitment pending placement for CD for direct transfer    Discharge location: TBD, pending clinical stabilization, medication optimization, and formulation of a safe discharge plan. Likely CARE, possibly in the next 14 days.      Discharge Medications: not ordered    Follow-up Appointments: not scheduled  ____________________________________________________________________  Pertinent Medical diagnoses and management:    None  ____________________________________________________________________  Patient seen and discussed with my resident physician, Dr. Theresa Reese MD and attending physician Dr. Galina De Jesus MD who are in agreement with my assessment and plan.    Nick Coates       The patient was seen and discussed with the medical student. I concur with the student's note.    Theresa Reese  PGY1 Psychiatry Resident    Attestation:  This patient has been seen and evaluated by me, Galina De Jesus MD.  I have discussed this patient with the house staff team including the resident and medical student and I agree with the findings and plan in this note.    I have reviewed today's vital signs, medications, labs and imaging. Galina De Jesus MD , PhD.

## 2022-08-02 NOTE — PLAN OF CARE
Problem: Plan of Care - These are the overarching goals to be used throughout the patient stay.    Goal: Absence of Hospital-Acquired Illness or Injury  Outcome: Ongoing, Progressing  Intervention: Prevent Skin Injury  Recent Flowsheet Documentation  Taken 8/1/2022 1900 by Ede Mcnair RN  Body Position: position changed independently  Intervention: Prevent and Manage VTE (Venous Thromboembolism) Risk  Recent Flowsheet Documentation  Taken 8/1/2022 1900 by Ede Mcnair RN  Activity Management:    ambulated in gibson    ambulated in room    ambulated to bathroom  Goal: Optimal Comfort and Wellbeing  Outcome: Ongoing, Progressing  Intervention: Provide Person-Centered Care  Recent Flowsheet Documentation  Taken 8/1/2022 1900 by Ede Mcnair RN  Trust Relationship/Rapport:    care explained    choices provided    emotional support provided    empathic listening provided    questions answered    questions encouraged    reassurance provided    thoughts/feelings acknowledged     Problem: Behavioral Health Plan of Care  Goal: Optimal Comfort and Wellbeing  Outcome: Ongoing, Progressing  Intervention: Provide Person-Centered Care  Recent Flowsheet Documentation  Taken 8/1/2022 1900 by Ede Mcnair RN  Trust Relationship/Rapport:    care explained    choices provided    emotional support provided    empathic listening provided    questions answered    questions encouraged    reassurance provided    thoughts/feelings acknowledged     Problem: Suicidal Behavior  Goal: Suicidal Behavior is Absent or Managed  Outcome: Ongoing, Progressing   Goal Outcome Evaluation:    Plan of Care Reviewed With: patient     Pleasant, cooperative and compliant with medications. Patient was in milieu most of the shift, he was social and interactive, spent time coloring and watching tv. He denied pain or discomfort, had shower. Anxiety rated at 6, denied depression, no IS/HI, contracted for safety.

## 2022-08-02 NOTE — PLAN OF CARE
08/02/22 1038   Individualization/Patient Specific Goals   Patient Personal Strengths community support;expressive of needs;resourceful;intellectual cognitive skills;positive vocational history;resilient   Patient Vulnerabilities adverse childhood experience(s);substance abuse/addiction   Anxieties, Fears or Concerns Want to get out of hospital ASAP   Individualized Care Needs None   Patient-Specific Goals (Include Timeframe) 1. Evaluation/stabilization of mood/thought d/o symptoms 2. Safe with self/others 3. medication mgmt per MD's 4. CD treatment in place- locked facility 5. Legal issues clarified 6. Coordination of care with outpatient providers   Interprofessional Rounds   Participants CTC;nursing;patient;psychiatrist   Team Discussion   Participants Dr. De Jesus, Dr. Reese, Dr. Forbes, Анна Suresh RN, Emiliana Colunga MA.LP, Med students   Progress Patient has been more stable- less maría, calmer. Patient is less intrusive.  He has been cooperative with meds. No behavioral issues   Anticipated length of stay 14 days   Continued Stay Criteria/Rationale PLacement at CARE facility. Patient is on the wait list.   Medical/Physical None   Plan Patient is on wait list for CARE,  CTC coordinating care with [facility] CM.   Rationale for change in precautions or plan No change in plan/precautions   Anticipated Discharge Disposition substance use treatment;another healthcare facility

## 2022-08-02 NOTE — PLAN OF CARE
"Occupational Therapy Group Note:     08/02/22 1100   Group Therapy Session   Group Attendance attended group session   Time Session Began 1015   Time Session Ended 1215   Total Time patient participated (minutes) 120   Total # Attendees 6-8   Group Type expressive therapy   Group Topic Covered coping skills/lifestyle management   Group Session Detail OT clinic   Patient Response/Contribution cooperative with task;organized   Patient Response Detail Pt actively participated in occupational therapy clinic to facilitate coping skill exploration, creative expression within personally meaningful activities, and clinical observation of social, cognitive, and kinesthetic performance skills. Pt response: Independent to initiate, gather materials, sequence, and adjust to workspace demands as needed. Demonstrated excellent focus, planning, and attention to detail for selected creative expression task task. Able to ask for assistance as needed, and socialized with peers and staff. Without being prompted, he nonchalantly described his reason for admission as wandering the streets of the [employer] \"drunk and high on cocaine.\" Shared his goal to go back to school in the future, and reported that he previously studied international business in college, though did not graduate. Pleasant and engaged.       "

## 2022-08-03 PROCEDURE — 250N000013 HC RX MED GY IP 250 OP 250 PS 637

## 2022-08-03 PROCEDURE — H2032 ACTIVITY THERAPY, PER 15 MIN: HCPCS

## 2022-08-03 PROCEDURE — 99232 SBSQ HOSP IP/OBS MODERATE 35: CPT | Mod: GC | Performed by: PSYCHIATRY & NEUROLOGY

## 2022-08-03 PROCEDURE — G0177 OPPS/PHP; TRAIN & EDUC SERV: HCPCS

## 2022-08-03 PROCEDURE — 124N000002 HC R&B MH UMMC

## 2022-08-03 PROCEDURE — 250N000013 HC RX MED GY IP 250 OP 250 PS 637: Performed by: PSYCHIATRY & NEUROLOGY

## 2022-08-03 RX ADMIN — NICOTINE POLACRILEX 4 MG: 4 GUM, CHEWING BUCCAL at 22:28

## 2022-08-03 RX ADMIN — MELATONIN TAB 3 MG 3 MG: 3 TAB at 21:13

## 2022-08-03 RX ADMIN — NICOTINE POLACRILEX 4 MG: 4 GUM, CHEWING BUCCAL at 11:06

## 2022-08-03 RX ADMIN — HYDROXYZINE HYDROCHLORIDE 25 MG: 25 TABLET ORAL at 22:28

## 2022-08-03 RX ADMIN — POLYETHYLENE GLYCOL 3350 17 G: 17 POWDER, FOR SOLUTION ORAL at 08:41

## 2022-08-03 RX ADMIN — PRAZOSIN HYDROCHLORIDE 1 MG: 1 CAPSULE ORAL at 21:13

## 2022-08-03 RX ADMIN — ALBUTEROL SULFATE 2 PUFF: 90 AEROSOL, METERED RESPIRATORY (INHALATION) at 22:32

## 2022-08-03 RX ADMIN — NICOTINE POLACRILEX 4 MG: 4 GUM, CHEWING BUCCAL at 10:02

## 2022-08-03 RX ADMIN — NICOTINE POLACRILEX 4 MG: 4 GUM, CHEWING BUCCAL at 21:14

## 2022-08-03 RX ADMIN — NICOTINE POLACRILEX 4 MG: 4 GUM, CHEWING BUCCAL at 13:23

## 2022-08-03 RX ADMIN — NICOTINE POLACRILEX 4 MG: 4 GUM, CHEWING BUCCAL at 08:41

## 2022-08-03 RX ADMIN — NAPROXEN 250 MG: 250 TABLET ORAL at 21:14

## 2022-08-03 RX ADMIN — CETIRIZINE HYDROCHLORIDE 10 MG: 10 TABLET, FILM COATED ORAL at 08:41

## 2022-08-03 RX ADMIN — OLANZAPINE 10 MG: 10 TABLET, FILM COATED ORAL at 21:13

## 2022-08-03 ASSESSMENT — ACTIVITIES OF DAILY LIVING (ADL)
ADLS_ACUITY_SCORE: 28
DRESS: INDEPENDENT
ADLS_ACUITY_SCORE: 28
ORAL_HYGIENE: INDEPENDENT
LAUNDRY: WITH SUPERVISION
ADLS_ACUITY_SCORE: 28
ADLS_ACUITY_SCORE: 28
HYGIENE/GROOMING: INDEPENDENT
ADLS_ACUITY_SCORE: 28

## 2022-08-03 NOTE — PLAN OF CARE
08/02/22 2139   Group Therapy Session   Group Attendance attended group session   Time Session Began 2000   Time Session Ended 2050   Total Time patient participated (minutes) 50   Total # Attendees 8   Group Type task skill   Group Topic Covered problem-solving;coping skills/lifestyle management   Group Session Detail OT Clininc   Patient Response/Contribution cooperative with task   Patient Response Detail See Note   Pt actively participated in occupational therapy clinic to facilitate coping skill exploration, creative expression within personally meaningful activities, and clinical observation of social, cognitive, and kinesthetic performance skills. Pt response: Independent to initiate, gather materials, sequence, and adjust to workspace demands as needed. Demonstrated fair focus, planning, and problem solving for selected worksheet task. Able to ask for assistance as needed, and appropriately social with peers and staff.

## 2022-08-03 NOTE — PLAN OF CARE
Pt appears to have slept for 6 hours. No PRNs given or requested. No concerns noted at this time. Will continue to monitor and offer support.     Problem: Sleep Disturbance  Goal: Adequate Sleep/Rest  Outcome: Ongoing, Progressing   Goal Outcome Evaluation:

## 2022-08-03 NOTE — PLAN OF CARE
[facility] Occupational Therapy Group Intervention Note     08/03/22 1510   Group Therapy Session   Group Attendance attended group sessions x2   Group Type psychoeducation;task skill;life skill   Group Topic Covered coping skills/lifestyle management;relaxation techniques;cognitive therapy techniques   Group Session Detail 1) journaling group for coping skill exploration and processing emotions to support healthy recovery. Education was provided on various uses of a journal, journaling tools, and exploration of personal reasons to journal.     2) clinic - coping skill exploration, creative expression within personally meaningful activities, and observation of social, cognitive, and kinesthetic performance skills   Patient Response/Contribution cooperative with task;listened actively;discussed personal experience with topic;organized   Patient Response Detail 1) Participated throughout education and created personal contract to practice 1 journaling tool in the next week. Following education, Pt demonstrated good focus while engaged in 10 minute journal practice with positive results.     2) Congruent social and performance skills as observed on previous dates.      Opal Jaeger OT on 8/3/2022 at 3:11 PM

## 2022-08-03 NOTE — PLAN OF CARE
"Pt was out in the lounge most of the evening watching tv and socializing with peers. Pt expressed frustrations that he has been in the hospital for too long. Pt was reassured that he will be discharged as soon as a place becomes available to discharge to. Pt said he would like the doctors to order Testosterone injections that he says he used to get before coming to the hospital. Pt was encouraged to discuss with team in the morning about it. Endorsed anxiety and depression that he says \"it is just there\". Denied SI/SIB/AH/VH. Pt presented with full range affect. Pt took prn Naproxen at HS for right biceps pain 8/10. Will monitor.    Problem: Behavioral Health Plan of Care  Goal: Adheres to Safety Considerations for Self and Others  Outcome: Ongoing, Progressing     Problem: Suicidal Behavior  Goal: Suicidal Behavior is Absent or Managed  Outcome: Ongoing, Progressing   Goal Outcome Evaluation:                      "

## 2022-08-03 NOTE — PLAN OF CARE
Problem: Plan of Care - These are the overarching goals to be used throughout the patient stay.    Goal: Plan of Care Review/Shift Note  Description: The Plan of Care Review/Shift note should be completed every shift.  The Outcome Evaluation is a brief statement about your assessment that the patient is improving, declining, or no change.  This information will be displayed automatically on your shift note.  Outcome: Ongoing, Progressing  Flowsheets (Taken 8/3/2022 1455)  Plan of Care Reviewed With: patient  Overall Patient Progress: no change     Problem: Behavioral Health Plan of Care  Goal: Plan of Care Review  Outcome: Ongoing, Progressing  Flowsheets (Taken 8/3/2022 1455)  Plan of Care Reviewed With: patient  Overall Patient Progress: no change  Patient Agreement with Plan of Care: agrees  Goal: Adheres to Safety Considerations for Self and Others  Outcome: Ongoing, Progressing  Flowsheets (Taken 8/3/2022 1455)  Adheres to Safety Considerations for Self and Others: making progress toward outcome   Goal Outcome Evaluation:    Plan of Care Reviewed With: patient     Overall Patient Progress: no change  Patient visible intermittently  in the milieu.Attended groups,sociable with some peers.No behaviors noted.medication compliant,no side effect noted.Denies all psych issues,denies hearing voices.Able to verbalize needs.  Temp: 97.5  F (36.4  C) Temp src: Oral BP: 110/67     Resp: 16 SpO2: 98 % O2 Device: None (Room air)

## 2022-08-03 NOTE — PLAN OF CARE
Assessment/Intervention/Current Symtoms and Care Coordination  The patient's care was discussed with the treatment team and chart notes were reviewed.  No changes in past 24 hours. Patient met with his [facility] CM yesterday.  Was s/w  irritable with her- again upset that he has to go to CARE facility rather than residential treatment- despite his appearing to have increased insight yesterday.  Has been attending groups, social with peers.    Discharge Plan or Goal  CARE facility     Barriers to Discharge   Further psychiatric  Stabilization - medication mgmt  Placement to CARE facility due to h/o repeatedly elopement from tx     Referral Status  None today     Legal Status     Civil commitment with Das order

## 2022-08-03 NOTE — PLAN OF CARE
Pt was visible in the milieu. Pt was calm and social with peers. Pt was in the lounge watching tv. Denied SI/SIB/AH/VH. Endorsed anxiety and prn Hydroxyzine was administered. Pt requested for Tylenol and Naproxen for pain on right biceps and said it was helpful. Ate supper 100% and compliant with his medications.    Problem: Behavioral Health Plan of Care  Goal: Adheres to Safety Considerations for Self and Others  Outcome: Ongoing, Progressing     Problem: Suicidal Behavior  Goal: Suicidal Behavior is Absent or Managed  Outcome: Ongoing, Progressing   Goal Outcome Evaluation:

## 2022-08-03 NOTE — PROGRESS NOTES
"    ----------------------------------------------------------------------------------------------------------  [facility]  Psychiatric Progress Note  Hospital Day #18    See Mendenhall MRN# [id number]   Age: 31 year old YOB: 1990   Date of Admission: 7/14/2022     Subjective   The patient was discussed with the treatment team and chart notes were reviewed.      Identifier: See Mendenhall is a 31 year old gentleman with a past psychiatric diagnosis of  Bipolar 2 disorder, substance use disorder, depressive disorder and anxiety disorder, PTSD and ADHD.     Sleep:  6 hours (08/03/22 0700)   Prescribed Medications: Taken as prescribed  PRN Psychiatric Medications: acetaminophen, albuterol, alum & mag hydroxide-simethicone, hydrocortisone, hydrOXYzine, lidocaine 4%, nicotine, OLANZapine, QUEtiapine    Overnight Nursing Notes/Staff Report:  No acute concerns overnight. No behavioral concerns this AM. Slept 7 hours.  Per RN has been, Mr. Mendenhall observed in milieu reading; pleasant and without concerns.    Patient interview:  See Mendenhall was interviewed in the conference room. Appears comfortable dressed in street clothes, he endorses feeling well. He states that he wants to get better, that he wants to get treatment. Emphasizes that he hopes to someday be off medication. Spoke with  yesterday, feels satisfied that he got to explain aspects of his presentation and events surrounding hospitalization especially legal context. Discusses personal realization from reading Erickson Joyner that he is not suffering like others (alludes to holocaust victims in camps in book) and that he is ultimately responsible for his situation.     Mr. Mendenhall expressed that he will need treatment because he was \"doing too much coke\" and this will most likely be in a locked CARE facility. Still expresses some hope he could get therapy in a more open environment but that the " decision lies with his .    Mr. Mendenhall does not have any requests or complaints today.  -------------------------------------------------------------------------------------------------------------------------  Suicidal ideation: denies current or recent suicidal ideation or behaviors.  Homicidal ideation: denies current or recent homicidal ideation or behaviors.  Psychotic symptoms: Patient denies AH, VH, paranoia, delusions.     Medication side effects reported: No significant side effects.  Acute medical concerns: none     ROS   ROS was negative unless noted above.     Objective   Vitals:  Temp: 97.5  F (36.4  C) (Temp  Min: 96.5  F (35.8  C)  Max: 97.5  F (36.4  C))  Resp: 16 (Resp  Min: 16  Max: 16)  SpO2: 98 % (SpO2  Min: 98 %  Max: 99 %)    (No data recorded)  BP: 110/67  Systolic (24hrs), Av , Min:110 , Max:110   Diastolic (24hrs), Av, Min:67, Max:67    Mental Status Examination:  Oriented to:  Person/Self, Situation, Date and location  General: Awake and Alert  Appearance:  appears stated age, Posture is relaxed in chair, Grooming is adequate and Dressed in street clothing  Behavior:  calm, cooperative and engaged  Eye Contact:  adequate  Psychomotor:  no abnormal motor symptoms appreciated; no catatonia present  Speech:  appropriate volume/tone, talkative and with good articulation  Language: Fluent in English with appropriate syntax and vocabulary.  Mood:  Positive. Feels safe and not threatened after transfer to station 20.  Affect:  congruent with mood, labile, irritable and anxious  Thought Process:  coherent, linear and logical  Thought Content: No SI/HI/AH/VH; No apparent delusions  Associations:  intact  Insight:  fair due to recognizing need to go to CD, but highly externalizing regarding circumstances  Judgment:  fair due to willingness to engage with treatment and go to CD  Attention Span: adequate for conversation  Concentration:  grossly intact  Recent and Remote Memory:   not formally assessed  Fund of Knowledge: average     Allergies     Allergies   Allergen Reactions     Other Drug Allergy (See Comments)      Fake metal        Labs & Imaging     No results found for this or any previous visit (from the past 24 hour(s)).     Assessment   Diagnostic Impression:  See Mendenhall is a 31 year old gentleman with a past psychiatric diagnosis of  Bipolar 2 disorder, substance use disorder, depressive disorder and anxiety disorder, PTSD and ADHD. He was admitted from the ER on 07/16/2022 where he was brought by the police due to concern for psychosis and SI with plan. This in the context of methamphetamine and cocaine use, unclear regarding medication non-adherence, he has significant history of substance use and psychosocial stressors including housing instability, unemployment, legal issues, trauma and chronic mental health issues.     He was brought to the ED with SI, erratic and impulsive behaviors and psychosis (paranoid delusions and disorganized behavior/thought process) in the context of methamphetamine and cocaine use.  His last psychiatric hospitalization was in April/2022 at [facility] for psychosis in context of substance use.  He is currently followed by PCP Erinn Maradiaga at [facility]. Current psychosocial stressors include legal issues, trauma, chronic mental health issues, family dynamics and medical issues which he has been coping with by using substances, acting out to self and acting out to others.  Patient's support system includes sister Samaria and friend Wilbert.      Substance use does appear to be playing a contributing role in the patient's presentation. Medical history does not appear to be significant, although chart review indicates history of chronic back pain, prescribed anabolic steroids that may be playing a role in presentation. In utero exposure and developmental delay need to be assessed.       Psychosocially history of trauma in  childhood, long history of substance use, currently being unemployed and experiencing housing insecurity, legal issues, not being able to see his daughter represent both precipitating and perpetuating factors contributing to presentation.      The MSE is notable for some persisting paranoia, mood lability, being guarded, tangentiality and limited attention span/concentration, hyper-talkativeness difficult to interrupt speech. He denies self injurious behaviors. His reported symptoms of VH, paranoid delusions and grossly disorganized thought process in the context of methamphetamine and cocaine use are suggestive of substance-induced psychosis, although definitive diagnosis is still in evolution and further collateral information from family/ outpatient provider is needed at this time; differential includes primary psychotic disorder exacerbated by substance use, PTSD, Bipolar Disorder, schizoaffective disorder.  Additionally, he has traits of impulsive behaviors which interfere with his ability to adhere with the treatment plan.       Optimization of medications to target these symptom clusters in addition to evaluation of adequate community supports will be targets for treatment during this admission.      Pt has history of previous attempts and substance use representing heightened acute risk for self/others patient warrants inpatient psychiatric hospitalization to maintain his safety until door to door treatment can be arranged. Disposition pending clinical stabilization, medication optimization and development of an appropriate discharge plan, including CD treatment referral upon discharge.     Principal Diagnoses:   Substance induced psychosis, now resolved  Amphetamine Use Disorder, severe  Cocaine Use Disorder, severe  Sedative Hypnotic Use Disorder, severe  Opiate Use Disorder, unspecified severity  Major depressive disorder, recurrent episode, moderate   Generalized anxiety disorder    Psychiatric & Medical  course:  See Mendenhall was admitted to [facility] on a 72 hour hold, transferred to Station 12 following a conflict with another patient. Patient in question has been removed from Station 20, so now Mr. Mendenhall has returned to Station 20. His PTA prazosin was continued.      See Mendenhall continued to meet criteria for inpatient hospitalization medication optimization, inpatient stabilization, and appropriate discharge planning.      7/19: added Seroquel 50 mg BEDTIME; 25 mg TID PRN   7/20: Quetiapine (Seroquel) increased to 100 mg, BEDTIME scheduled. Keep 25 mg TID PRN  7/20: melatonin 3mg scheduled per patient request  7/25: Pending confirmation of CARE referral and status on list with outpatient CM. If anticipated to be a prolonged wait for placement, will consider less restrictive options for CD.  7/26: Ongoing issues with outpatient CM Agatha not returning messages or answering calls from team or patient. Message left with CM supervisor given persistent issues reaching CM.  7/27: OP CM strongly advocating for CARE placement given patient's history of several failed treatments and violent/aggressive behaviors in context of substance use. OP CM did confirm that she placed CARE referral. Pt continues to voice frustration about length of stay and delay in CARE referral as he was informed that the referral was placed over one week ago.   7/28: Returned to Station 20 from Station 12. CARE placement planned, likely will be available within two weeks. Mr. Mendenhall requests audience with  (Agatha) to explain circumstances of relapse and admission, even if it will not change the course of treatment or discharge to CARE facility, still wishes to clarify details. Changed scheduled SEROQUEL to ZYPREXA 10 mg at bedtime. SEROQUEL ordered PRN  7/29: CARE placement planned, but Mr. Mendenhall continues to request audience with  (Agatha). Should have been placed on list for CARE facility previously as  he is on hospital day 13. Will ensure this is done today.  scheduled to visit unit and speak with Mr. Mendenhall on 8/1. Persistent anxiety, request for valium denied due to CD history and need to maintain CARE eligibility. Increased PRN ZYPREXA to accommodate maximum total dose of 40 mg/day (along with scheduled). Complains of shoulder pain s/p rotator cuff surgery and biceps tear in 12/2021, request for tramadol denied due to CD history. Ordered PRN naproxen and PRN lidocaine cream instead.  8/1: Seasonal allergies; ordered PRN Cetirizine 10 mg      Plan   Today's Changes:     No changes.  _______________________________________________________________________  Psychiatric Management:    Prazosin 1mg at bedtime    Quetiapine 100mg at bedtime    Civil commitment with Das    Additional Planning:    Continue to monitor for and stabilize: irritable, psychosis and substance use    Patient will be treated in therapeutic milieu with appropriate individual and group therapies as described.    Scheduled Medications (summary):  Current Facility-Administered Medications   Medication Dose Route Frequency     cetirizine  10 mg Oral Daily     melatonin  3 mg Oral At Bedtime     OLANZapine  10 mg Oral At Bedtime     polyethylene glycol  17 g Oral Daily     prazosin  1 mg Oral At Bedtime     PRN Medications (summary):  Current Facility-Administered Medications   Medication Dose Route Frequency     acetaminophen  650 mg Oral Q4H PRN     albuterol  2 puff Inhalation Q6H PRN     alum & mag hydroxide-simethicone  30 mL Oral Q4H PRN     hydrocortisone   Topical BID PRN     hydrOXYzine  25 mg Oral Q4H PRN     lidocaine 4%   Topical BID PRN     naproxen  250 mg Oral TID PRN     nicotine  4 mg Buccal Q1H PRN     OLANZapine  5-10 mg Oral TID PRN    Or     OLANZapine  10 mg Intramuscular TID PRN     QUEtiapine  100 mg Oral At Bedtime PRN     QUEtiapine  25-50 mg Oral TID PRN     Legal Status:    Das    Committed      SIO:    No    Pt has not required locked seclusion or restraints in the past 24 hours to maintain safety, please refer to RN documentation for further details.    Safety Assessment:   Behavioral Orders   Procedures     Assault precautions     Code 1 - Restrict to Unit     Routine Programming     As clinically indicated     Status 15     Every 15 minutes.     Disposition:    Reason for ongoing admission: Civil commitment pending placement for CD for direct transfer    Discharge location: TBD, pending clinical stabilization, medication optimization, and formulation of a safe discharge plan. Likely CARE, possibly in the next 14 days.      Discharge Medications: not ordered    Follow-up Appointments: not scheduled  ____________________________________________________________________  Pertinent Medical diagnoses and management:    None  ____________________________________________________________________  Patient seen and discussed with my resident physician, Dr. Theresa Reese MD and attending physician Dr. Galina De Jesus MD who are in agreement with my assessment and plan.    Nick Coates       The patient was seen and discussed with the medical student. I concur with the student's note.    Theresa Reese  PGY1 Psychiatry Resident    Attestation:  This patient has been seen and evaluated by me, Galina De Jesus MD.  I have discussed this patient with the house staff team including the resident and medical student and I agree with the findings and plan in this note.    I have reviewed today's vital signs, medications, labs and imaging. Galina De Jesus MD , PhD.

## 2022-08-04 PROCEDURE — 250N000013 HC RX MED GY IP 250 OP 250 PS 637

## 2022-08-04 PROCEDURE — 250N000013 HC RX MED GY IP 250 OP 250 PS 637: Performed by: PSYCHIATRY & NEUROLOGY

## 2022-08-04 PROCEDURE — 124N000002 HC R&B MH UMMC

## 2022-08-04 PROCEDURE — 36415 COLL VENOUS BLD VENIPUNCTURE: CPT

## 2022-08-04 PROCEDURE — 84403 ASSAY OF TOTAL TESTOSTERONE: CPT

## 2022-08-04 PROCEDURE — G0177 OPPS/PHP; TRAIN & EDUC SERV: HCPCS

## 2022-08-04 PROCEDURE — 99232 SBSQ HOSP IP/OBS MODERATE 35: CPT | Mod: GC | Performed by: PSYCHIATRY & NEUROLOGY

## 2022-08-04 PROCEDURE — 84270 ASSAY OF SEX HORMONE GLOBUL: CPT

## 2022-08-04 RX ORDER — MULTIPLE VITAMINS W/ MINERALS TAB 9MG-400MCG
1 TAB ORAL DAILY
Status: DISCONTINUED | OUTPATIENT
Start: 2022-08-04 | End: 2022-08-29 | Stop reason: HOSPADM

## 2022-08-04 RX ADMIN — NICOTINE POLACRILEX 4 MG: 4 GUM, CHEWING BUCCAL at 14:46

## 2022-08-04 RX ADMIN — CETIRIZINE HYDROCHLORIDE 10 MG: 10 TABLET, FILM COATED ORAL at 08:29

## 2022-08-04 RX ADMIN — HYDROXYZINE HYDROCHLORIDE 25 MG: 25 TABLET ORAL at 08:31

## 2022-08-04 RX ADMIN — NICOTINE POLACRILEX 4 MG: 4 GUM, CHEWING BUCCAL at 18:09

## 2022-08-04 RX ADMIN — HYDROXYZINE HYDROCHLORIDE 25 MG: 25 TABLET ORAL at 22:59

## 2022-08-04 RX ADMIN — NICOTINE POLACRILEX 4 MG: 4 GUM, CHEWING BUCCAL at 09:34

## 2022-08-04 RX ADMIN — NICOTINE POLACRILEX 4 MG: 4 GUM, CHEWING BUCCAL at 19:39

## 2022-08-04 RX ADMIN — ACETAMINOPHEN 325MG 650 MG: 325 TABLET ORAL at 00:59

## 2022-08-04 RX ADMIN — NICOTINE POLACRILEX 4 MG: 4 GUM, CHEWING BUCCAL at 12:01

## 2022-08-04 RX ADMIN — PRAZOSIN HYDROCHLORIDE 1 MG: 1 CAPSULE ORAL at 21:36

## 2022-08-04 RX ADMIN — MULTIPLE VITAMINS W/ MINERALS TAB 1 TABLET: TAB at 12:01

## 2022-08-04 RX ADMIN — OLANZAPINE 10 MG: 10 TABLET, FILM COATED ORAL at 21:36

## 2022-08-04 RX ADMIN — MELATONIN TAB 3 MG 3 MG: 3 TAB at 21:36

## 2022-08-04 RX ADMIN — NICOTINE POLACRILEX 4 MG: 4 GUM, CHEWING BUCCAL at 08:31

## 2022-08-04 RX ADMIN — POLYETHYLENE GLYCOL 3350 17 G: 17 POWDER, FOR SOLUTION ORAL at 08:29

## 2022-08-04 RX ADMIN — ACETAMINOPHEN 325MG 650 MG: 325 TABLET ORAL at 21:39

## 2022-08-04 RX ADMIN — NAPROXEN 250 MG: 250 TABLET ORAL at 19:39

## 2022-08-04 RX ADMIN — NICOTINE POLACRILEX 4 MG: 4 GUM, CHEWING BUCCAL at 21:40

## 2022-08-04 RX ADMIN — NICOTINE POLACRILEX 4 MG: 4 GUM, CHEWING BUCCAL at 23:00

## 2022-08-04 ASSESSMENT — ACTIVITIES OF DAILY LIVING (ADL)
ADLS_ACUITY_SCORE: 28
ORAL_HYGIENE: INDEPENDENT
ADLS_ACUITY_SCORE: 28
DRESS: INDEPENDENT
ADLS_ACUITY_SCORE: 28
LAUNDRY: WITH SUPERVISION
HYGIENE/GROOMING: INDEPENDENT
ADLS_ACUITY_SCORE: 28
ADLS_ACUITY_SCORE: 28

## 2022-08-04 NOTE — PROGRESS NOTES
08/03/22 2100   Group Therapy Session   Time Session Began 2005   Time Session Ended 2100   Total Time patient participated (minutes) 55   Total # Attendees 4   Group Type expressive therapy   Group Topic Covered balanced lifestyle;coping skills/lifestyle management;relaxation techniques   Group Session Detail Relaxation   Patient Response/Contribution cooperative with task   Patient Response Detail Cooperatively engaged in Evening Music Relaxation group to decrease anxiety and promote sleep.  Calm affect, responding well to the music.  Warm affect.

## 2022-08-04 NOTE — PLAN OF CARE
Problem: Sleep Disturbance  Goal: Adequate Sleep/Rest  Outcome: Ongoing, Progressing     Pt appeared to have slept for  7 hours. At 0059, tylenol 650 mg was given for generalized body pain rated 5/10.  PRN medication was effective, as pt was able to sleep after administration. 15 minutes safety checks  was in place during shift. Staff will continue to offer support to pt.

## 2022-08-04 NOTE — PLAN OF CARE
[facility] Occupational Therapy Group Intervention Note     08/04/22 1543   Group Therapy Session   Group Attendance attended group sessions x3   Group Type task skill;psychoeducation;life skill   Group Session Detail 1)clinic - coping skill exploration, creative expression within personally meaningful activities, and observation of social, cognitive, and kinesthetic performance skills    2) discussion and utilization of social emotional supports. education provided on various types of community resources and supportive relationship.     3) mental health management group focused on gratitude. Education was provided on the connection between gratitude and quality of life, specifically within various roles and occupations.     Patient Response/Contribution cooperative with task;discussed personal experience with topic;organized;listened actively;offered helpful suggestions to peers   Patient Response Detail 1) congruent affect. Focused to task. No significant change in Pt performance.     2) completed a personal assessment of his support network as a method for coping. Reported his aunt as the most significant support in his life at this time     3) highly engaged and receptive to topic. Expressed ability to be open to positive / optimistic thinking and ability to be patient.      Opal Jaeger OT on 8/4/2022 at 3:44 PM

## 2022-08-04 NOTE — PLAN OF CARE
Assessment/Intervention/Current Symtoms and Care Coordination  The patient's care was discussed with the treatment team and chart notes were reviewed.  Writer checked in with patient per his request. Patient reports he called [facility] to inquire if he is on their waiting list. Patient was informed a referral has not been received, therefore, he is not on their waiting list.     - Patient asked this writer to print information about [facility] located in [facility]. Patient asked this writer if a referral can be sent there instead of CARE. Patient stated he had been at [facility] before and completed tx at their inpatient program. Writer provided patient with the requested information and advice patient to discuss his options with his [facility] and hospital treatment team.          Discharge Plan or Goal  CARE facility     Barriers to Discharge   Further psychiatric  Stabilization - medication mgmt  Placement to CARE facility due to h/o repeatedly elopement from tx     Referral Status  None today     Legal Status     Civil commitment with Das order

## 2022-08-04 NOTE — PROGRESS NOTES
"    ----------------------------------------------------------------------------------------------------------  [facility]  Psychiatric Progress Note  Hospital Day #19    See Mendenhall MRN# [id number]   Age: 31 year old YOB: 1990   Date of Admission: 7/14/2022     Subjective   The patient was discussed with the treatment team and chart notes were reviewed.      Identifier: See Mendenhall is a 31 year old man with a past psychiatric diagnosis of  Bipolar 2 disorder, substance use disorder, depressive disorder, anxiety disorder, PTSD and ADHD.     Sleep:  7 hours (08/04/22 0600)   Prescribed Medications: Taken as prescribed  PRN Psychiatric Medications: acetaminophen, albuterol, alum & mag hydroxide-simethicone, hydrocortisone, hydrOXYzine, lidocaine 4%, nicotine, OLANZapine, QUEtiapine    Overnight Nursing Notes/Staff Report:  \"Pt appeared to have slept for  7 hours. At 0059, tylenol 650 mg was given for generalized body pain rated 5/10.  PRN medication was effective, as pt was able to sleep after administration. 15 minutes safety checks  was in place during shift. Staff will continue to offer support to pt.\"    Patient interview:  See Mendenhall was interviewed in the conference room. Appears comfortable dressed in street clothes, he endorses feeling well. He is frustrated today with his legal status and uncertainty surrounding next facility. We discussed his legal issues at length including that CARE facilitys may not be currently adding patients to their list.     Mr. Mendenhall expressed that he will need treatment because and stated that he is fine with CARE facility, that he will go anywhere and is ready for next stage, but feels he has been here too long and does not want to wait for opening. He has been researching [facility] in [place], [place] as an option. He expresses frustration and agitation, stating he would \"rather be in [facility]\" since that " "would be \"going toward something.\" States he feels more depressed here because he does not have his buprenorphine and Suboxone because those were stopped due to CARE transfer but now if he isn't going to the CARE he wants those.     Care team explained complex legal issues and encouraged him to work with staff and  to resolve CARE wait list. Explained he is under legal commitment and we cannot make unilateral decisions regarding disposition for this reason.    Mr. Mendenhall requests testosterone, states he was getting previously due to low levels but has not been receiving since admission. Requested multivitamin.  -------------------------------------------------------------------------------------------------------------------------  Suicidal ideation: denies current or recent suicidal ideation or behaviors.  Homicidal ideation: denies current or recent homicidal ideation or behaviors.  Psychotic symptoms: Patient denies AH, VH, paranoia, delusions.     Medication side effects reported: No significant side effects.  Acute medical concerns: none     ROS   ROS was negative unless noted above.     Objective   Vitals:  Temp: 97.5  F (36.4  C) (Temp  Min: 97.5  F (36.4  C)  Max: 97.5  F (36.4  C))  Resp: 16 (Resp  Min: 16  Max: 16)  SpO2: 98 % (SpO2  Min: 98 %  Max: 98 %)    (No data recorded)  BP: 124/78  Systolic (24hrs), Av , Min:124 , Max:124   Diastolic (24hrs), Av, Min:78, Max:78    Mental Status Examination:  Oriented to:  Person/Self, Situation, Date and location  General: Awake and Alert  Appearance:  appears stated age, Posture is relaxed in chair, Grooming is adequate and Dressed in street clothing  Behavior:  calm, cooperative and engaged  Eye Contact:  adequate  Psychomotor:  no abnormal motor symptoms appreciated; no catatonia present  Speech:  appropriate volume/tone, talkative and with good articulation  Language: Fluent in English with appropriate syntax and vocabulary.  Mood:  " Positive. Feels safe and not threatened after transfer to station 20.  Affect:  congruent with mood, labile, irritable and anxious  Thought Process:  coherent, linear and logical  Thought Content: No SI/HI/AH/VH; No apparent delusions  Associations:  intact  Insight:  fair due to recognizing need to go to CD, but highly externalizing regarding circumstances  Judgment:  fair due to willingness to engage with treatment and go to CD  Attention Span: adequate for conversation  Concentration:  grossly intact  Recent and Remote Memory:  not formally assessed  Fund of Knowledge: average     Allergies     Allergies   Allergen Reactions     Other Drug Allergy (See Comments)      Fake metal        Labs & Imaging     No results found for this or any previous visit (from the past 24 hour(s)).     Assessment   Diagnostic Impression:  See Mendenhall is a 31 year old gentleman with a past psychiatric diagnosis of  Bipolar 2 disorder, substance use disorder, depressive disorder and anxiety disorder, PTSD and ADHD. He was admitted from the ER on 07/16/2022 where he was brought by the police due to concern for psychosis and SI with plan. This in the context of methamphetamine and cocaine use, unclear regarding medication non-adherence, he has significant history of substance use and psychosocial stressors including housing instability, unemployment, legal issues, trauma and chronic mental health issues.     He was brought to the ED with SI, erratic and impulsive behaviors and psychosis (paranoid delusions and disorganized behavior/thought process) in the context of methamphetamine and cocaine use.  His last psychiatric hospitalization was in April/2022 at [facility] for psychosis in context of substance use.  He is currently followed by PCP Erinn Maradiaga at [facility]. Current psychosocial stressors include legal issues, trauma, chronic mental health issues, family dynamics and medical issues which he has been  coping with by using substances, acting out to self and acting out to others.  Patient's support system includes sister Samaria and friend Wilbert.      Substance use does appear to be playing a contributing role in the patient's presentation. Medical history does not appear to be significant, although chart review indicates history of chronic back pain, prescribed anabolic steroids that may be playing a role in presentation. In utero exposure and developmental delay need to be assessed.       Psychosocially history of trauma in childhood, long history of substance use, currently being unemployed and experiencing housing insecurity, legal issues, not being able to see his daughter represent both precipitating and perpetuating factors contributing to presentation.      The MSE is notable for some persisting paranoia, mood lability, being guarded, tangentiality and limited attention span/concentration, hyper-talkativeness difficult to interrupt speech. He denies self injurious behaviors. His reported symptoms of VH, paranoid delusions and grossly disorganized thought process in the context of methamphetamine and cocaine use are suggestive of substance-induced psychosis, although definitive diagnosis is still in evolution and further collateral information from family/ outpatient provider is needed at this time; differential includes primary psychotic disorder exacerbated by substance use, PTSD, Bipolar Disorder, schizoaffective disorder.  Additionally, he has traits of impulsive behaviors which interfere with his ability to adhere with the treatment plan.       Optimization of medications to target these symptom clusters in addition to evaluation of adequate community supports will be targets for treatment during this admission.      Pt has history of previous attempts and substance use representing heightened acute risk for self/others patient warrants inpatient psychiatric hospitalization to maintain his safety until  door to door treatment can be arranged. Disposition pending clinical stabilization, medication optimization and development of an appropriate discharge plan, including CD treatment referral upon discharge.     Principal Diagnoses:   Substance induced psychosis, now resolved  Amphetamine Use Disorder, severe  Cocaine Use Disorder, severe  Sedative Hypnotic Use Disorder, severe  Opiate Use Disorder, unspecified severity  Major depressive disorder, recurrent episode, moderate   Generalized anxiety disorder    Psychiatric & Medical course:  See Mendenhall was admitted to [facility] on a 72 hour hold, transferred to [facility] following a conflict with another patient. Patient in question has been removed from [facility], so now Mr. Mendenhall has returned to [facility]. His PTA prazosin was continued.      See Mendenhall continued to meet criteria for inpatient hospitalization medication optimization, inpatient stabilization, and appropriate discharge planning.      7/19: added Seroquel 50 mg BEDTIME; 25 mg TID PRN   7/20: Quetiapine (Seroquel) increased to 100 mg, BEDTIME scheduled. Keep 25 mg TID PRN  7/20: melatonin 3mg scheduled per patient request  7/25: Pending confirmation of CARE referral and status on list with outpatient CM. If anticipated to be a prolonged wait for placement, will consider less restrictive options for CD.  7/26: Ongoing issues with outpatient CM Agatha not returning messages or answering calls from team or patient. Message left with CM supervisor given persistent issues reaching CM.  7/27: OP CM strongly advocating for CARE placement given patient's history of several failed treatments and violent/aggressive behaviors in context of substance use. OP CM did confirm that she placed CARE referral. Pt continues to voice frustration about length of stay and delay in CARE referral as he was informed that the referral was placed over one week ago.   7/28: Returned to Station 20 from [facility] 12.  CARE placement planned, likely will be available within two weeks. Mr. Mendenhall requests audience with  (Agatha) to explain circumstances of relapse and admission, even if it will not change the course of treatment or discharge to CARE facility, still wishes to clarify details. Changed scheduled SEROQUEL to ZYPREXA 10 mg at bedtime. SEROQUEL ordered PRN  7/29: CARE placement planned, but Mr. Mendenhall continues to request audience with  (Agatha). Should have been placed on list for CARE facility previously as he is on hospital day 13. Will ensure this is done today.  scheduled to visit unit and speak with Mr. Mendenhall on 8/1. Persistent anxiety, request for valium denied due to CD history and need to maintain CARE eligibility. Increased PRN ZYPREXA to accommodate maximum total dose of 40 mg/day (along with scheduled). Complains of shoulder pain s/p rotator cuff surgery and biceps tear in 12/2021, request for tramadol denied due to CD history. Ordered PRN naproxen and PRN lidocaine cream instead.  8/1: Seasonal allergies; ordered PRN Cetirizine 10 mg      Plan   Today's Changes:     Add multivitamin    Check testosterone level  _______________________________________________________________________  Psychiatric Management:    Prazosin 1mg at bedtime    Quetiapine 100mg at bedtime    Civil commitment with Das    Additional Planning:    Continue to monitor for and stabilize: irritable, psychosis and substance use    Patient will be treated in therapeutic milieu with appropriate individual and group therapies as described.    Scheduled Medications (summary):  Current Facility-Administered Medications   Medication Dose Route Frequency     cetirizine  10 mg Oral Daily     melatonin  3 mg Oral At Bedtime     OLANZapine  10 mg Oral At Bedtime     polyethylene glycol  17 g Oral Daily     prazosin  1 mg Oral At Bedtime     PRN Medications (summary):  Current Facility-Administered  Medications   Medication Dose Route Frequency     acetaminophen  650 mg Oral Q4H PRN     albuterol  2 puff Inhalation Q6H PRN     alum & mag hydroxide-simethicone  30 mL Oral Q4H PRN     hydrocortisone   Topical BID PRN     hydrOXYzine  25 mg Oral Q4H PRN     lidocaine 4%   Topical BID PRN     naproxen  250 mg Oral TID PRN     nicotine  4 mg Buccal Q1H PRN     OLANZapine  5-10 mg Oral TID PRN    Or     OLANZapine  10 mg Intramuscular TID PRN     QUEtiapine  100 mg Oral At Bedtime PRN     QUEtiapine  25-50 mg Oral TID PRN     Legal Status:    Das    Committed     SIO:    No    Pt has not required locked seclusion or restraints in the past 24 hours to maintain safety, please refer to RN documentation for further details.    Safety Assessment:   Behavioral Orders   Procedures     Assault precautions     Code 1 - Restrict to Unit     Routine Programming     As clinically indicated     Status 15     Every 15 minutes.     Disposition:    Reason for ongoing admission: Civil commitment pending placement for CD for direct transfer    Discharge location: TBD, pending clinical stabilization, medication optimization, and formulation of a safe discharge plan. Likely CARE, possibly in the next 14 days.      Discharge Medications: not ordered    Follow-up Appointments: not scheduled  ____________________________________________________________________  Pertinent Medical diagnoses and management:    None  ____________________________________________________________________  Patient seen and discussed with my resident physician, Dr. Theresa Reese MD and attending physician Dr. Galina De Jesus MD who are in agreement with my assessment and plan.    Nick Coates       The patient was seen and discussed with the medical student. I concur with the student's note.    Theresa Reese  PGY1 Psychiatry Resident    Attestation:  This patient has been seen and evaluated by me, Galina De Jesus MD.  I have discussed this patient  with the house staff team including the resident and medical student and I agree with the findings and plan in this note.    I have reviewed today's vital signs, medications, labs and imaging. Galina De Jesus MD , PhD.

## 2022-08-04 NOTE — PLAN OF CARE
Problem: Plan of Care - These are the overarching goals to be used throughout the patient stay.    Goal: Plan of Care Review/Shift Note  Description: The Plan of Care Review/Shift note should be completed every shift.  The Outcome Evaluation is a brief statement about your assessment that the patient is improving, declining, or no change.  This information will be displayed automatically on your shift note.  Outcome: Ongoing, Progressing  Flowsheets (Taken 8/4/2022 1423)  Plan of Care Reviewed With: patient  Overall Patient Progress: improving   Goal Outcome Evaluation:    Plan of Care Reviewed With: patient     Overall Patient Progress: improving  Patient has been visible in  milieu.Attended groups.Sociable with peers.talked to family on phone.Affect labile,mood is calm.denies pain/discomfort.Able to verbalize needs.PRN Hydroxyzine for anxiety with relief.Adequate PO.Denies suicidal thoughts,no behaviors.Denies SI/SIB/Hallucinations& depression.Pleasant.  Temp: 98  F (36.7  C) Temp src: Oral BP: 124/78     Resp: 16 SpO2: 97 % O2 Device: None (Room air)

## 2022-08-05 LAB — SHBG SERPL-SCNC: 13 NMOL/L (ref 11–80)

## 2022-08-05 PROCEDURE — 250N000013 HC RX MED GY IP 250 OP 250 PS 637

## 2022-08-05 PROCEDURE — 124N000002 HC R&B MH UMMC

## 2022-08-05 PROCEDURE — 250N000013 HC RX MED GY IP 250 OP 250 PS 637: Performed by: PSYCHIATRY & NEUROLOGY

## 2022-08-05 PROCEDURE — G0177 OPPS/PHP; TRAIN & EDUC SERV: HCPCS

## 2022-08-05 PROCEDURE — 99232 SBSQ HOSP IP/OBS MODERATE 35: CPT | Mod: GC | Performed by: PSYCHIATRY & NEUROLOGY

## 2022-08-05 RX ORDER — LANOLIN ALCOHOL/MO/W.PET/CERES
3 CREAM (GRAM) TOPICAL
Status: DISCONTINUED | OUTPATIENT
Start: 2022-08-05 | End: 2022-08-29 | Stop reason: HOSPADM

## 2022-08-05 RX ORDER — DESVENLAFAXINE 25 MG/1
25 TABLET, EXTENDED RELEASE ORAL DAILY
Status: DISCONTINUED | OUTPATIENT
Start: 2022-08-05 | End: 2022-08-10

## 2022-08-05 RX ADMIN — NICOTINE POLACRILEX 4 MG: 4 GUM, CHEWING BUCCAL at 17:07

## 2022-08-05 RX ADMIN — DESVENLAFAXINE 25 MG: 25 TABLET, EXTENDED RELEASE ORAL at 14:17

## 2022-08-05 RX ADMIN — MELATONIN TAB 3 MG 3 MG: 3 TAB at 19:39

## 2022-08-05 RX ADMIN — NAPROXEN 250 MG: 250 TABLET ORAL at 21:41

## 2022-08-05 RX ADMIN — MULTIPLE VITAMINS W/ MINERALS TAB 1 TABLET: TAB at 08:41

## 2022-08-05 RX ADMIN — HYDROXYZINE HYDROCHLORIDE 25 MG: 25 TABLET ORAL at 21:44

## 2022-08-05 RX ADMIN — OLANZAPINE 10 MG: 10 TABLET, FILM COATED ORAL at 21:45

## 2022-08-05 RX ADMIN — ACETAMINOPHEN 325MG 650 MG: 325 TABLET ORAL at 19:40

## 2022-08-05 RX ADMIN — NICOTINE POLACRILEX 4 MG: 4 GUM, CHEWING BUCCAL at 21:41

## 2022-08-05 RX ADMIN — CETIRIZINE HYDROCHLORIDE 10 MG: 10 TABLET, FILM COATED ORAL at 08:41

## 2022-08-05 RX ADMIN — NICOTINE POLACRILEX 4 MG: 4 GUM, CHEWING BUCCAL at 10:06

## 2022-08-05 RX ADMIN — NICOTINE POLACRILEX 4 MG: 4 GUM, CHEWING BUCCAL at 13:24

## 2022-08-05 RX ADMIN — NICOTINE POLACRILEX 4 MG: 4 GUM, CHEWING BUCCAL at 15:12

## 2022-08-05 RX ADMIN — POLYETHYLENE GLYCOL 3350 17 G: 17 POWDER, FOR SOLUTION ORAL at 08:41

## 2022-08-05 RX ADMIN — PRAZOSIN HYDROCHLORIDE 1 MG: 1 CAPSULE ORAL at 19:39

## 2022-08-05 ASSESSMENT — ACTIVITIES OF DAILY LIVING (ADL)
ADLS_ACUITY_SCORE: 28
HYGIENE/GROOMING: INDEPENDENT
ADLS_ACUITY_SCORE: 28
ORAL_HYGIENE: INDEPENDENT
ADLS_ACUITY_SCORE: 28
ORAL_HYGIENE: INDEPENDENT
ADLS_ACUITY_SCORE: 28
ADLS_ACUITY_SCORE: 28
DRESS: INDEPENDENT
ADLS_ACUITY_SCORE: 28
ADLS_ACUITY_SCORE: 28
HYGIENE/GROOMING: INDEPENDENT
LAUNDRY: WITH SUPERVISION
ADLS_ACUITY_SCORE: 28
DRESS: STREET CLOTHES;INDEPENDENT

## 2022-08-05 NOTE — PLAN OF CARE
Pt was visible in the milieu and social with peers. Presents with full range affect. Endorsed anxiety and depression. Denies SI/SIB/AH/VH. Prn Naproxen given for back and right biceps pain. Compliant with all medications. Good appetite ate supper 100%.     Problem: Behavioral Health Plan of Care  Goal: Adheres to Safety Considerations for Self and Others  Outcome: Ongoing, Progressing     Problem: Suicidal Behavior  Goal: Suicidal Behavior is Absent or Managed  Outcome: Ongoing, Progressing   Goal Outcome Evaluation:

## 2022-08-05 NOTE — PLAN OF CARE
08/05/22 1242   Group Therapy Session   Group Attendance attended group session   Time Session Began 1015   Time Session Ended 1105   Total Time patient participated (minutes) 50   Total # Attendees 5   Group Type Occupational Therapy   Group Topic Covered balanced lifestyle;coping skills/lifestyle management;leisure exploration/use of leisure time;relaxation techniques   Group Session Detail OT Clinic Group   Patient Response Detail   Intervention: OT Clinic with 4 peers    Patient Response: Pt joined clinic group and IND initiated working on a simple coloring project. Pt was social with writer and a peer sitting nearby them throughout the duration of group making small talk about various subjects. Pt demonstrated continuous focus and attention to project while engaging in these conversations and worked on project for the duration of the group.     Cognition: Goal directed       Mood/Affect: Pleasant     Plan: Patient encouraged to maintain attendance for continued ongoing support in working towards occupational therapy goals to support overall treatment/care.

## 2022-08-05 NOTE — PLAN OF CARE
"   08/05/22 1438   Group Therapy Session   Group Attendance attended group session   Time Session Began 1315   Time Session Ended 1400   Total Time patient participated (minutes) 45   Total # Attendees 3   Group Type Occupational Therapy   Group Topic Covered cognitive therapy techniques;coping skills/lifestyle management;leisure exploration/use of leisure time;emotions/expression;relaxation techniques;self-care activities;structured socialization   Group Session Detail General Health and Coping   Patient Response Detail Intervention: General Health and Coping Group with 2 peers.    Patient Response: Pt participated in Bingo game with emphasis on positive coping skills and emotional regulation and self-care practices through group discussion questions embedded in game. Pt was an active participant throughout the duration of the activity, IND offering responses to each of the coping skills questions. Pt emphasized that since coming here they have been able to make a to do list of different tasks such as (obtaining social security card, calling about taxes...) and have been working on taking care of these tasks and feeling more focused and accomplished. Pt did require one redirection in conversation regarding techniques pt uses to relax to avoid using substances as an example, stating \"I'm just kidding\" and then redirecting to appropriate answers. Pt was social with writer and other peers in group, offering additional comments and responses to topics discussed.     Mood/Affect: Bright, Pleasant       Plan: Patient encouraged to maintain attendance for continued ongoing support in working towards occupational therapy goals to support overall treatment/care.          "

## 2022-08-05 NOTE — PLAN OF CARE
"Pt has been withdrawn to self throughout most of the day. He has been visible in the milieu intermittently but has minimally engaged with other patients. When he interacts with staff, he has been calm, polite and appropriate. He has mostly been pacing in the hallway listening to headphones and making some phone calls. Pt communicated that he is \"frustrated\" and \"just wants to get out of here.\" Currently, disposition planning is to wait for opening following a recent referral for a CARE facility. Pt presented with flat affect and overall calm mood. No safety concerns at this time. Pt given first dose of new medication, Pristique, this afternoon.     Problem: Plan of Care - These are the overarching goals to be used throughout the patient stay.    Goal: Plan of Care Review/Shift Note  Description: The Plan of Care Review/Shift note should be completed every shift.  The Outcome Evaluation is a brief statement about your assessment that the patient is improving, declining, or no change.  This information will be displayed automatically on your shift note.  Recent Flowsheet Documentation  Taken 8/5/2022 1059 by Marci Almanza  Plan of Care Reviewed With: patient     Problem: Behavioral Health Plan of Care  Goal: Plan of Care Review  Recent Flowsheet Documentation  Taken 8/5/2022 1059 by Marci Almanza  Plan of Care Reviewed With: patient  Patient Agreement with Plan of Care: agrees     "

## 2022-08-05 NOTE — PLAN OF CARE
Patient appears sleeping during rounds. Patient prefers to sleep on the floor. Patient no complains of pain and discomfort.  No behavioral issues or any safety concerns currently. Will continue to monitor the patient and provide therapeutic intervention as needed. Will continue with current plan of care. Notify MD with any concerns. The patient had 6.5 total hours of sleep this shift.     Problem: Sleep Disturbance  Goal: Adequate Sleep/Rest  Outcome: Ongoing, Progressing

## 2022-08-05 NOTE — PLAN OF CARE
Problem: Behavioral Health Plan of Care  Goal: Adheres to Safety Considerations for Self and Others  Outcome: Ongoing, Progressing  Flowsheets (Taken 8/5/2022 1826)  Adheres to Safety Considerations for Self and Others: making progress toward outcome  Intervention: Develop and Maintain Individualized Safety Plan  Recent Flowsheet Documentation  Taken 8/5/2022 1658 by Ede Mcnair, RN  Safety Measures:    environmental rounds completed    safety rounds completed  Goal: Absence of New-Onset Illness or Injury  Outcome: Ongoing, Progressing  Intervention: Identify and Manage Fall Risk  Recent Flowsheet Documentation  Taken 8/5/2022 1658 by Ede Mcnair, RN  Safety Measures:    environmental rounds completed    safety rounds completed     Problem: Suicidal Behavior  Goal: Suicidal Behavior is Absent or Managed  Outcome: Ongoing, Progressing  Flowsheets (Taken 8/5/2022 1826)  Mutually Determined Action Steps (Suicidal Behavior Absent/Managed): sets future-oriented goal   Goal Outcome Evaluation:    Plan of Care Reviewed With: patient     Pleasant, cooperative, compliant with medication. Visible and social in milieu. Spent most of his time watching tv and interacting with peers. Had shower, right upper arm pain rated at 8, pain managed with PRN Tylenol and Naproxen. Denied depression but anxiety rated at 8, anxiety managed with PRN Hydroxyzine, no SI/HI, contracted for safety.

## 2022-08-05 NOTE — PROGRESS NOTES
"    ----------------------------------------------------------------------------------------------------------  [facility]  Psychiatric Progress Note  Hospital Day #20    See Mendenhall MRN# [id number]   Age: 31 year old YOB: 1990   Date of Admission: 7/14/2022     Subjective   The patient was discussed with the treatment team and chart notes were reviewed.      Identifier: See Mendenhall is a 31 year old man with a past psychiatric diagnosis of  Bipolar 2 disorder, substance use disorder, depressive disorder, anxiety disorder, PTSD and ADHD.     Sleep:  6.5 hours (08/05/22 0630)   Prescribed Medications: Taken as prescribed  PRN Psychiatric Medications: acetaminophen, albuterol, alum & mag hydroxide-simethicone, hydrocortisone, hydrOXYzine, lidocaine 4%, nicotine, OLANZapine, QUEtiapine    Overnight Nursing Notes/Staff Report:  \"Patient prefers to sleep on the floor... Patient no complains of pain and discomfort...The patient had 6.5 total hours of sleep this shift. \"    Patient interview:  See Mendenhall was interviewed in the conference room. Appears comfortable dressed in street clothes. State mood is \"more stable\" but endorses feeling mood disturbances he describes as \"bad depression\" or \"low lows\". Speech was rapid during interview but not pressured. Affect was anxious and somewhat incongruent with complaints of depressed mood. He endorses persistent frustration being here but it was confirmed he is on the CARE facility wait list beginning 8/3 and is feeling better about that. Indicated that he has been having perseverative thoughts about how mistakes he has made. He states has been \"feeling way down\" lately and getting emotional about his daughter and wants to get better.     Mr. Mendenhall states olanzapine has been working well for anxiety but requested medication to help with depression. He has used desvenlafaxine at 25-50 mg/day in the past and requested to " "try again. This was added to his scheduled medications and he was warned about withdrawal effects from abrupt discontinuation. Also requested increased melatonin due to sleep issues. Informed he is likely on a maximum dose for effects but added additional 3 mg PRN.    Mr. Mendenhall requests testosterone, informed that we are still waiting on labs to see if level is low.  -------------------------------------------------------------------------------------------------------------------------  Suicidal ideation: denies current or recent suicidal ideation or behaviors.  Homicidal ideation: denies current or recent homicidal ideation or behaviors.  Psychotic symptoms: Patient denies AH, VH, paranoia, delusions.     Medication side effects reported: No significant side effects.  Acute medical concerns: none     ROS   ROS was negative unless noted above.     Objective   Vitals:  Temp: 98.3  F (36.8  C) (Temp  Min: 98.3  F (36.8  C)  Max: 98.3  F (36.8  C))  Resp: 16 (Resp  Min: 16  Max: 17)  SpO2: 97 % (SpO2  Min: 97 %  Max: 97 %)    (No data recorded)  BP: 132/68  Systolic (24hrs), Av , Min:132 , Max:132   Diastolic (24hrs), Av, Min:68, Max:68    Mental Status Examination:  Oriented to:  Person/Self, Situation, Date and location  General: Awake and Alert  Appearance:  appears stated age, Posture is relaxed in chair, Grooming is adequate and Dressed in street clothing  Behavior:  calm, cooperative and engaged  Eye Contact:  adequate  Psychomotor:  no abnormal motor symptoms appreciated; no catatonia present  Speech:  appropriate volume/tone, talkative and with good articulation  Language: Fluent in English with appropriate syntax and vocabulary.  Mood:  \"more stable\" but \"Been feeling way down\"  Affect:  congruent with mood, labile, irritable and anxious  Thought Process:  coherent, linear and logical  Thought Content: No SI/HI/AH/VH; No apparent delusions  Associations:  intact  Insight:  fair due to recognizing " need to go to CD, but highly externalizing regarding circumstances  Judgment:  fair due to willingness to engage with treatment and go to CD  Attention Span: adequate for conversation  Concentration:  grossly intact  Recent and Remote Memory:  not formally assessed  Fund of Knowledge: average     Allergies     Allergies   Allergen Reactions     Other Drug Allergy (See Comments)      Fake metal        Labs & Imaging     No results found for this or any previous visit (from the past 24 hour(s)).       Assessment   Diagnostic Impression:  See Mendenhall is a 31 year old gentleman with a past psychiatric diagnosis of  Bipolar 2 disorder, substance use disorder, depressive disorder and anxiety disorder, PTSD and ADHD. He was admitted from the ER on 07/16/2022 where he was brought by the police due to concern for psychosis and SI with plan. This in the context of methamphetamine and cocaine use, unclear regarding medication non-adherence, he has significant history of substance use and psychosocial stressors including housing instability, unemployment, legal issues, trauma and chronic mental health issues.     He was brought to the ED with SI, erratic and impulsive behaviors and psychosis (paranoid delusions and disorganized behavior/thought process) in the context of methamphetamine and cocaine use.  His last psychiatric hospitalization was in April/2022 at [facility] for psychosis in context of substance use.  He is currently followed by PCP Erinn Maradiaga at [facility]. Current psychosocial stressors include legal issues, trauma, chronic mental health issues, family dynamics and medical issues which he has been coping with by using substances, acting out to self and acting out to others.  Patient's support system includes sister Samaria and friend Wilbert.      Substance use does appear to be playing a contributing role in the patient's presentation. Medical history does not appear to be significant,  although chart review indicates history of chronic back pain, prescribed anabolic steroids that may be playing a role in presentation. In utero exposure and developmental delay need to be assessed.       Psychosocially history of trauma in childhood, long history of substance use, currently being unemployed and experiencing housing insecurity, legal issues, not being able to see his daughter represent both precipitating and perpetuating factors contributing to presentation.      The MSE is notable for some persisting paranoia, mood lability, being guarded, tangentiality and limited attention span/concentration, hyper-talkativeness difficult to interrupt speech. He denies self injurious behaviors. His reported symptoms of VH, paranoid delusions and grossly disorganized thought process in the context of methamphetamine and cocaine use are suggestive of substance-induced psychosis, although definitive diagnosis is still in evolution and further collateral information from family/ outpatient provider is needed at this time; differential includes primary psychotic disorder exacerbated by substance use, PTSD, Bipolar Disorder, schizoaffective disorder.  Additionally, he has traits of impulsive behaviors which interfere with his ability to adhere with the treatment plan.       Optimization of medications to target these symptom clusters in addition to evaluation of adequate community supports will be targets for treatment during this admission.      Pt has history of previous attempts and substance use representing heightened acute risk for self/others patient warrants inpatient psychiatric hospitalization to maintain his safety until door to door treatment can be arranged. Disposition pending clinical stabilization, medication optimization and development of an appropriate discharge plan, including CD treatment referral upon discharge.     Principal Diagnoses:   Substance induced psychosis, now resolved  Amphetamine Use  Disorder, severe  Cocaine Use Disorder, severe  Sedative Hypnotic Use Disorder, severe  Opiate Use Disorder, unspecified severity  Major depressive disorder, recurrent episode, moderate   Generalized anxiety disorder    Psychiatric & Medical course:  See Mendenhall was admitted to [facility] on a 72 hour hold, transferred to Station 12 following a conflict with another patient. Patient in question has been removed from Station 20, so now Mr. Mendenhall has returned to Station 20. His PTA prazosin was continued.      See Mendenhall continued to meet criteria for inpatient hospitalization medication optimization, inpatient stabilization, and appropriate discharge planning.      7/19: added Seroquel 50 mg BEDTIME; 25 mg TID PRN   7/20: Quetiapine (Seroquel) increased to 100 mg, BEDTIME scheduled. Keep 25 mg TID PRN  7/20: melatonin 3mg scheduled per patient request  7/25: Pending confirmation of CARE referral and status on list with outpatient CM. If anticipated to be a prolonged wait for placement, will consider less restrictive options for CD.  7/26: Ongoing issues with outpatient CM Agatha not returning messages or answering calls from team or patient. Message left with CM supervisor given persistent issues reaching CM.  7/27: OP CM strongly advocating for CARE placement given patient's history of several failed treatments and violent/aggressive behaviors in context of substance use. OP CM did confirm that she placed CARE referral. Pt continues to voice frustration about length of stay and delay in CARE referral as he was informed that the referral was placed over one week ago.   7/28: Returned to Station 20 from [facility] 12. CARE placement planned, likely will be available within two weeks. Mr. Mendenhall requests audience with  (Agatha) to explain circumstances of relapse and admission, even if it will not change the course of treatment or discharge to CARE facility, still wishes to clarify details.  Changed scheduled SEROQUEL to ZYPREXA 10 mg at bedtime. SEROQUEL ordered PRN  7/29: CARE placement planned, but Mr. Mendenhall continues to request audience with  (Agatha). Should have been placed on list for CARE facility previously as he is on hospital day 13. Will ensure this is done today.  scheduled to visit unit and speak with Mr. Mendenhall on 8/1. Persistent anxiety, request for valium denied due to CD history and need to maintain CARE eligibility. Increased PRN ZYPREXA to accommodate maximum total dose of 40 mg/day (along with scheduled). Complains of shoulder pain s/p rotator cuff surgery and biceps tear in 12/2021, request for tramadol denied due to CD history. Ordered PRN naproxen and PRN lidocaine cream instead.  8/1: Seasonal allergies; ordered PRN Cetirizine 10 mg   8/2: patient requested testosterone supplementation; ordered serum testosterone and SHBG  8/3: Added desvenlafaxine 25 mg daily and added PRN melatonin 3 mg to existing scheduled melatonin 3 mg QPM; Testosterone labs pending.     Plan   Today's Changes:     Added desvenlafaxine 25 mg daily    Added PRN melatonin 3 mg to existing scheduled melatonin 3 mg QPM  _______________________________________________________________________  Psychiatric Management:    Prazosin 1mg at bedtime    Quetiapine 100mg at bedtime    Civil commitment with Das    Additional Planning:    Continue to monitor for and stabilize: irritable, psychosis and substance use    Patient will be treated in therapeutic milieu with appropriate individual and group therapies as described.    Scheduled Medications (summary):  Current Facility-Administered Medications   Medication Dose Route Frequency     cetirizine  10 mg Oral Daily     desvenlafaxine succinate  25 mg Oral Daily     melatonin  3 mg Oral At Bedtime     multivitamin w/minerals  1 tablet Oral Daily     OLANZapine  10 mg Oral At Bedtime     polyethylene glycol  17 g Oral Daily     prazosin  1  mg Oral At Bedtime     PRN Medications (summary):  Current Facility-Administered Medications   Medication Dose Route Frequency     acetaminophen  650 mg Oral Q4H PRN     albuterol  2 puff Inhalation Q6H PRN     alum & mag hydroxide-simethicone  30 mL Oral Q4H PRN     hydrocortisone   Topical BID PRN     hydrOXYzine  25 mg Oral Q4H PRN     lidocaine 4%   Topical BID PRN     melatonin  3 mg Oral At Bedtime PRN     naproxen  250 mg Oral TID PRN     nicotine  4 mg Buccal Q1H PRN     OLANZapine  5-10 mg Oral TID PRN    Or     OLANZapine  10 mg Intramuscular TID PRN     QUEtiapine  100 mg Oral At Bedtime PRN     QUEtiapine  25-50 mg Oral TID PRN     Legal Status:    Das    Committed     SIO:    No    Pt has not required locked seclusion or restraints in the past 24 hours to maintain safety, please refer to RN documentation for further details.    Safety Assessment:   Behavioral Orders   Procedures     Assault precautions     Code 1 - Restrict to Unit     Routine Programming     As clinically indicated     Status 15     Every 15 minutes.     Disposition:    Reason for ongoing admission:  Civil commitment pending placement for CD for direct transfer    Discharge location: TBD, pending clinical stabilization, medication optimization, and formulation of a safe discharge plan. Likely CARE, possibly in the next 14 days.      Discharge Medications: not ordered    Follow-up Appointments: not scheduled  ____________________________________________________________________  Pertinent Medical diagnoses and management:    None  ____________________________________________________________________  Patient seen and discussed with my resident physician, Dr. Theresa Reese MD and attending physician Dr. Galina De Jesus MD who are in agreement with my assessment and plan.    Nick Coates       The patient was seen and discussed with the medical student. I concur with the student's note.    Theresa Reese  PGY1 Psychiatry  Resident    Attestation:  This patient has been seen and evaluated by me, Galina De Jesus MD.  I have discussed this patient with the house staff team including the resident and medical student and I agree with the findings and plan in this note.    I have reviewed today's vital signs, medications, labs and imaging. Galina De Jesus MD , PhD.

## 2022-08-05 NOTE — PLAN OF CARE
Assessment/Intervention/Current Symtoms and Care Coordination  The patient's care was discussed with the treatment team and chart notes were reviewed.  Writer called [facility] ([phone]) to verify referral status. Referral was sent on 8/3 by patient's CM.      - Writer faxed patient's application for [facility] at [phone].        Discharge Plan or Goal  CARE facility     Barriers to Discharge   Further psychiatric  Stabilization - medication mgmt  Placement to CARE facility due to h/o repeatedly elopement from tx     Referral Status  None today     Legal Status     Civil commitment with Das order

## 2022-08-06 LAB
TESTOST FREE SERPL-MCNC: 13.51 NG/DL
TESTOST SERPL-MCNC: 436 NG/DL (ref 240–950)

## 2022-08-06 PROCEDURE — 250N000013 HC RX MED GY IP 250 OP 250 PS 637: Performed by: PSYCHIATRY & NEUROLOGY

## 2022-08-06 PROCEDURE — 250N000013 HC RX MED GY IP 250 OP 250 PS 637

## 2022-08-06 PROCEDURE — 124N000002 HC R&B MH UMMC

## 2022-08-06 RX ADMIN — ACETAMINOPHEN 325MG 650 MG: 325 TABLET ORAL at 22:24

## 2022-08-06 RX ADMIN — NICOTINE POLACRILEX 4 MG: 4 GUM, CHEWING BUCCAL at 13:23

## 2022-08-06 RX ADMIN — NICOTINE POLACRILEX 4 MG: 4 GUM, CHEWING BUCCAL at 21:13

## 2022-08-06 RX ADMIN — POLYETHYLENE GLYCOL 3350 17 G: 17 POWDER, FOR SOLUTION ORAL at 08:25

## 2022-08-06 RX ADMIN — MELATONIN TAB 3 MG 3 MG: 3 TAB at 21:07

## 2022-08-06 RX ADMIN — NAPROXEN 250 MG: 250 TABLET ORAL at 21:12

## 2022-08-06 RX ADMIN — NICOTINE POLACRILEX 4 MG: 4 GUM, CHEWING BUCCAL at 22:24

## 2022-08-06 RX ADMIN — OLANZAPINE 10 MG: 10 TABLET, FILM COATED ORAL at 21:07

## 2022-08-06 RX ADMIN — NICOTINE POLACRILEX 4 MG: 4 GUM, CHEWING BUCCAL at 11:13

## 2022-08-06 RX ADMIN — HYDROXYZINE HYDROCHLORIDE 25 MG: 25 TABLET ORAL at 21:12

## 2022-08-06 RX ADMIN — DESVENLAFAXINE 25 MG: 25 TABLET, EXTENDED RELEASE ORAL at 08:25

## 2022-08-06 RX ADMIN — MULTIPLE VITAMINS W/ MINERALS TAB 1 TABLET: TAB at 08:25

## 2022-08-06 RX ADMIN — CETIRIZINE HYDROCHLORIDE 10 MG: 10 TABLET, FILM COATED ORAL at 08:25

## 2022-08-06 RX ADMIN — PRAZOSIN HYDROCHLORIDE 1 MG: 1 CAPSULE ORAL at 21:07

## 2022-08-06 RX ADMIN — NICOTINE POLACRILEX 4 MG: 4 GUM, CHEWING BUCCAL at 17:04

## 2022-08-06 RX ADMIN — NICOTINE POLACRILEX 4 MG: 4 GUM, CHEWING BUCCAL at 10:13

## 2022-08-06 ASSESSMENT — ACTIVITIES OF DAILY LIVING (ADL)
ADLS_ACUITY_SCORE: 28
HYGIENE/GROOMING: INDEPENDENT
DRESS: INDEPENDENT
HYGIENE/GROOMING: INDEPENDENT
ADLS_ACUITY_SCORE: 28
ORAL_HYGIENE: INDEPENDENT
ADLS_ACUITY_SCORE: 28
ADLS_ACUITY_SCORE: 28
ORAL_HYGIENE: INDEPENDENT
LAUNDRY: WITH SUPERVISION
LAUNDRY: WITH SUPERVISION
ADLS_ACUITY_SCORE: 28
DRESS: INDEPENDENT;SCRUBS (BEHAVIORAL HEALTH)
ADLS_ACUITY_SCORE: 28
ADLS_ACUITY_SCORE: 28

## 2022-08-06 NOTE — PLAN OF CARE
Patient no complains of pain and discomfort. Patient sleeps on the floor mat per his preference. Patient had 7 hours of total hours of sleep. No behavioral or any safety concerns @ this time. Will continue to monitor the patient and provide therapeutic intervention as needed. Will continue with current plan of care.   Problem: Sleep Disturbance  Goal: Adequate Sleep/Rest  Outcome: Ongoing, Progressing

## 2022-08-06 NOTE — PLAN OF CARE
Problem: Behavioral Health Plan of Care  Goal: Plan of Care Review  Recent Flowsheet Documentation  Taken 8/6/2022 1700 by Haim Call RN  Plan of Care Reviewed With: patient  Patient Agreement with Plan of Care: agrees   Goal Outcome Evaluation:    Plan of Care Reviewed With: patient     Pt spent the majority of the shift in the milieu watching TV and socialized with a select peer. Denied all psych sx's and stated that he is waiting for placement to a MI/CD facility. Voiced his frustration over the long process to be discharged. States his goal is to get treatment and to get a job and wants to leave. Pt took PRN hydroxyzine and naproxen at 2112 for anxiety and right bicep pain rated at a 8/10.
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
## 2022-08-07 PROCEDURE — 250N000013 HC RX MED GY IP 250 OP 250 PS 637

## 2022-08-07 PROCEDURE — 124N000002 HC R&B MH UMMC

## 2022-08-07 PROCEDURE — 90853 GROUP PSYCHOTHERAPY: CPT

## 2022-08-07 PROCEDURE — 250N000013 HC RX MED GY IP 250 OP 250 PS 637: Performed by: PSYCHIATRY & NEUROLOGY

## 2022-08-07 RX ADMIN — OLANZAPINE 10 MG: 10 TABLET, FILM COATED ORAL at 20:50

## 2022-08-07 RX ADMIN — DESVENLAFAXINE 25 MG: 25 TABLET, EXTENDED RELEASE ORAL at 09:53

## 2022-08-07 RX ADMIN — HYDROXYZINE HYDROCHLORIDE 25 MG: 25 TABLET ORAL at 01:05

## 2022-08-07 RX ADMIN — MULTIPLE VITAMINS W/ MINERALS TAB 1 TABLET: TAB at 09:53

## 2022-08-07 RX ADMIN — NICOTINE POLACRILEX 4 MG: 4 GUM, CHEWING BUCCAL at 19:33

## 2022-08-07 RX ADMIN — NICOTINE POLACRILEX 4 MG: 4 GUM, CHEWING BUCCAL at 11:08

## 2022-08-07 RX ADMIN — NICOTINE POLACRILEX 4 MG: 4 GUM, CHEWING BUCCAL at 13:21

## 2022-08-07 RX ADMIN — POLYETHYLENE GLYCOL 3350 17 G: 17 POWDER, FOR SOLUTION ORAL at 09:52

## 2022-08-07 RX ADMIN — MELATONIN TAB 3 MG 3 MG: 3 TAB at 20:50

## 2022-08-07 RX ADMIN — NAPROXEN 250 MG: 250 TABLET ORAL at 20:50

## 2022-08-07 RX ADMIN — ACETAMINOPHEN 325MG 650 MG: 325 TABLET ORAL at 14:45

## 2022-08-07 RX ADMIN — CETIRIZINE HYDROCHLORIDE 10 MG: 10 TABLET, FILM COATED ORAL at 09:53

## 2022-08-07 RX ADMIN — HYDROXYZINE HYDROCHLORIDE 25 MG: 25 TABLET ORAL at 20:50

## 2022-08-07 RX ADMIN — NICOTINE POLACRILEX 4 MG: 4 GUM, CHEWING BUCCAL at 20:50

## 2022-08-07 RX ADMIN — NICOTINE POLACRILEX 4 MG: 4 GUM, CHEWING BUCCAL at 21:50

## 2022-08-07 RX ADMIN — PRAZOSIN HYDROCHLORIDE 1 MG: 1 CAPSULE ORAL at 20:50

## 2022-08-07 RX ADMIN — NICOTINE POLACRILEX 4 MG: 4 GUM, CHEWING BUCCAL at 09:53

## 2022-08-07 RX ADMIN — NICOTINE POLACRILEX 4 MG: 4 GUM, CHEWING BUCCAL at 14:25

## 2022-08-07 ASSESSMENT — ACTIVITIES OF DAILY LIVING (ADL)
ADLS_ACUITY_SCORE: 28
DRESS: INDEPENDENT
ORAL_HYGIENE: INDEPENDENT
ADLS_ACUITY_SCORE: 28
HYGIENE/GROOMING: INDEPENDENT
ADLS_ACUITY_SCORE: 28
ADLS_ACUITY_SCORE: 28
LAUNDRY: WITH SUPERVISION
ADLS_ACUITY_SCORE: 28
ORAL_HYGIENE: INDEPENDENT
ADLS_ACUITY_SCORE: 28
HYGIENE/GROOMING: INDEPENDENT
ADLS_ACUITY_SCORE: 28
LAUNDRY: WITH SUPERVISION
ADLS_ACUITY_SCORE: 28
ADLS_ACUITY_SCORE: 28
DRESS: INDEPENDENT;SCRUBS (BEHAVIORAL HEALTH)
ADLS_ACUITY_SCORE: 28

## 2022-08-07 NOTE — PLAN OF CARE
22 1833   Group Therapy Session   Group Attendance attended group session   Time Session Began    Time Session Ended    Total Time patient participated (minutes) 55   Total # Attendees 8   Group Type psychotherapeutic   Group Topic Covered coping skills/lifestyle management   Group Session Detail Group participated in random questions and discussion including topics of whether they would have regrets if they  today, 5 year goal, how do they show kindness to others, how they define success, something they do regret, motivation to keep going and how they handle disagreements.   Patient Response/Contribution cooperative with task;discussed personal experience with topic;organized;listened actively;offered helpful suggestions to peers   Patient Response Detail Pt participated actively, answered questions thoughtfully

## 2022-08-07 NOTE — PLAN OF CARE
Problem: Plan of Care - These are the overarching goals to be used throughout the patient stay.    Goal: Plan of Care Review/Shift Note  Outcome: Ongoing, Progressing  Flowsheets (Taken 8/7/2022 1600)  Plan of Care Reviewed With: patient   Goal Outcome Evaluation:    Plan of Care Reviewed With: patient      Pt spent the majority of the evening in the milieu watching movies and kept to self. Pt presented with a blunted affect but brightened during our conversations. Pt also participated in evening group. Denied SI/SIB or hallucinations. Pt is waiting placement to a MI/CD facility. Pt was calm, pleasant, and cooperative.  Pt took PRNs hydroxyzine and naprosyn at 2050 for anxiety and right bicep pain respectively.

## 2022-08-07 NOTE — PLAN OF CARE
Patient complained of R arm pain, requested  Tylenol but not due yet. Patient medicated with Hydroxyzine for anxiety and R arm pain- effective. Patient sleeps most of the shift. Patient sleeps on the floor mat per his preference. Patient had 6.5 hours of total hours of sleep. No behavioral or any safety concerns @ this time. Will continue to monitor the patient and provide therapeutic intervention as needed. Will continue with current plan of care.   Problem: Sleep Disturbance  Goal: Adequate Sleep/Rest  Outcome: Ongoing, Progressing

## 2022-08-07 NOTE — PLAN OF CARE
"Pt slept in later today than usual and when he did wake up, stated: \"My plan was to sleep through the whole day.\" He also stated that \"being here makes him more depressed.\" Overall, he presented with low energy, flat affect, and calm mood. He sat in the lounge most of the day watching TV and movies among other patients, although he socialized minimally with them. He has been appropriate in his interactions with staff. No PRNs requested or administered this shift. No safety concerns at this time.    Problem: Behavioral Health Plan of Care  Goal: Plan of Care Review  Recent Flowsheet Documentation  Taken 8/7/2022 1105 by Marci Almanza  Plan of Care Reviewed With: patient  Patient Agreement with Plan of Care: agrees     "

## 2022-08-08 PROCEDURE — 250N000013 HC RX MED GY IP 250 OP 250 PS 637

## 2022-08-08 PROCEDURE — H2032 ACTIVITY THERAPY, PER 15 MIN: HCPCS

## 2022-08-08 PROCEDURE — G0177 OPPS/PHP; TRAIN & EDUC SERV: HCPCS

## 2022-08-08 PROCEDURE — 124N000002 HC R&B MH UMMC

## 2022-08-08 PROCEDURE — 250N000013 HC RX MED GY IP 250 OP 250 PS 637: Performed by: PSYCHIATRY & NEUROLOGY

## 2022-08-08 PROCEDURE — 99231 SBSQ HOSP IP/OBS SF/LOW 25: CPT | Mod: GC | Performed by: PSYCHIATRY & NEUROLOGY

## 2022-08-08 RX ADMIN — ACETAMINOPHEN 325MG 650 MG: 325 TABLET ORAL at 18:24

## 2022-08-08 RX ADMIN — ACETAMINOPHEN 325MG 650 MG: 325 TABLET ORAL at 00:35

## 2022-08-08 RX ADMIN — NICOTINE POLACRILEX 4 MG: 4 GUM, CHEWING BUCCAL at 21:26

## 2022-08-08 RX ADMIN — MELATONIN TAB 3 MG 3 MG: 3 TAB at 00:34

## 2022-08-08 RX ADMIN — NAPROXEN 250 MG: 250 TABLET ORAL at 21:25

## 2022-08-08 RX ADMIN — HYDROXYZINE HYDROCHLORIDE 25 MG: 25 TABLET ORAL at 00:34

## 2022-08-08 RX ADMIN — DESVENLAFAXINE 25 MG: 25 TABLET, EXTENDED RELEASE ORAL at 09:00

## 2022-08-08 RX ADMIN — NICOTINE POLACRILEX 4 MG: 4 GUM, CHEWING BUCCAL at 09:00

## 2022-08-08 RX ADMIN — MULTIPLE VITAMINS W/ MINERALS TAB 1 TABLET: TAB at 09:00

## 2022-08-08 RX ADMIN — CETIRIZINE HYDROCHLORIDE 10 MG: 10 TABLET, FILM COATED ORAL at 09:00

## 2022-08-08 RX ADMIN — POLYETHYLENE GLYCOL 3350 17 G: 17 POWDER, FOR SOLUTION ORAL at 09:00

## 2022-08-08 RX ADMIN — NICOTINE POLACRILEX 4 MG: 4 GUM, CHEWING BUCCAL at 13:19

## 2022-08-08 RX ADMIN — MELATONIN TAB 3 MG 3 MG: 3 TAB at 21:24

## 2022-08-08 RX ADMIN — NICOTINE POLACRILEX 4 MG: 4 GUM, CHEWING BUCCAL at 14:46

## 2022-08-08 RX ADMIN — NICOTINE POLACRILEX 4 MG: 4 GUM, CHEWING BUCCAL at 19:09

## 2022-08-08 RX ADMIN — NAPROXEN 250 MG: 250 TABLET ORAL at 09:07

## 2022-08-08 RX ADMIN — HYDROXYZINE HYDROCHLORIDE 25 MG: 25 TABLET ORAL at 21:29

## 2022-08-08 RX ADMIN — NICOTINE POLACRILEX 4 MG: 4 GUM, CHEWING BUCCAL at 17:36

## 2022-08-08 RX ADMIN — OLANZAPINE 10 MG: 10 TABLET, FILM COATED ORAL at 21:25

## 2022-08-08 RX ADMIN — PRAZOSIN HYDROCHLORIDE 1 MG: 1 CAPSULE ORAL at 21:24

## 2022-08-08 RX ADMIN — HYDROXYZINE HYDROCHLORIDE 25 MG: 25 TABLET ORAL at 13:19

## 2022-08-08 ASSESSMENT — ACTIVITIES OF DAILY LIVING (ADL)
LAUNDRY: WITH SUPERVISION
ORAL_HYGIENE: INDEPENDENT
ADLS_ACUITY_SCORE: 28
DRESS: INDEPENDENT
HYGIENE/GROOMING: INDEPENDENT
ORAL_HYGIENE: INDEPENDENT
ADLS_ACUITY_SCORE: 28
HYGIENE/GROOMING: INDEPENDENT
ADLS_ACUITY_SCORE: 28
LAUNDRY: WITH SUPERVISION
ADLS_ACUITY_SCORE: 28
ADLS_ACUITY_SCORE: 28
DRESS: INDEPENDENT;STREET CLOTHES

## 2022-08-08 NOTE — PLAN OF CARE
Assessment/Intervention/Current Symtoms and Care Coordination  The patient's care was discussed with the treatment team and chart notes were reviewed.  Patient has remained stable on the unit- no behavioral concerns.  Patient has been attending groups, compliant with meds..           Discharge Plan or Goal  CARE facility     Barriers to Discharge   Placement to CARE facility due to h/o repeatedly elopement from tx     Referral Status  Application sent to [facility] for economic assistance per patient rquest     Legal Status     Civil commitment with Das order

## 2022-08-08 NOTE — PLAN OF CARE
Problem: Plan of Care - These are the overarching goals to be used throughout the patient stay.    Goal: Plan of Care Review/Shift Note  Outcome: Ongoing, Progressing  Flowsheets (Taken 8/8/2022 1800)  Plan of Care Reviewed With: patient   Goal Outcome Evaluation:    Plan of Care Reviewed With: patient     Pt presented with a full range affect and spent the majority of the shift in the milieu socializing w/peers, watching TV, making phone calls, and participated in group. Pt denied all psych sx's and states he is waiting for discharge to a CARE facility. Pt was bright, calm, cooperative, and pleasant. Pt took PRN Tylenol at 1824 for pain rated at a 8/10 for R bicep pain, and PRNs hydroxyzine, and naproxen for anxiety and pain respectively.

## 2022-08-08 NOTE — PLAN OF CARE
Patient complained of R arm pain. Patient medicated with Hydroxyzine for anxiety and R arm pain- effective. Patient sleeps most of the shift. Patient sleeps on the floor mat per his preference. Patient had 6.75 hours of total hours of sleep. No behavioral or any safety concerns @ this time. Will continue to monitor the patient and provide therapeutic intervention as needed. Will continue with current plan of care.   Problem: Sleep Disturbance  Goal: Adequate Sleep/Rest  8/8/2022 0606 by Yuki Alvarado RN  Outcome: Ongoing, Progressing

## 2022-08-08 NOTE — PROGRESS NOTES
"    ----------------------------------------------------------------------------------------------------------  [facility]  Psychiatric Progress Note  Hospital Day #23    See Mendenhall MRN# [id number]   Age: 31 year old YOB: 1990   Date of Admission: 7/14/2022     Subjective   The patient was discussed with the treatment team and chart notes were reviewed.      Identifier: See Mendenhall is a 31 year old man with a past psychiatric diagnosis of  Bipolar 2 disorder, substance use disorder, depressive disorder, anxiety disorder, PTSD and ADHD.     Sleep:  6.75 hours (08/08/22 0643)   Prescribed Medications: Taken as prescribed  PRN Psychiatric Medications: acetaminophen, albuterol, alum & mag hydroxide-simethicone, hydrocortisone, hydrOXYzine, lidocaine 4%, nicotine, OLANZapine, QUEtiapine    Overnight Nursing Notes/Staff Report:  Patient slept well. No complaints. Some anxiety endorsed this weekend and PRNs provided.    Patient interview:  See Mendenhall was interviewed in the conference room. Appears comfortable dressed in street clothes. State mood is \"good.\" Speech rate was normal. Affect was calm and mood congruent. 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 Endorsed that every day is a new opportunity.    Treatment team discussed testosterone labs with Mr. Mendenhall; no need for supplementation given that free testosterone and SHBG within normal limits.     Requested access to '[employer]' for clothing because he is expecting a visit and wants clean/new clothes. Referred him to speak to the nursing unit.    -------------------------------------------------------------------------------------------------------------------------  Suicidal ideation: denies current or recent suicidal ideation or behaviors.  Homicidal ideation: denies current or recent homicidal ideation or " "behaviors.  Psychotic symptoms: Patient denies AH, VH, paranoia, delusions.     Medication side effects reported: No significant side effects.  Acute medical concerns: none     ROS   ROS was negative unless noted above.     Objective   Vitals:  Temp: (!) 96.5  F (35.8  C) (Temp  Min: 96.5  F (35.8  C)  Max: 98  F (36.7  C))  Resp: 16 (Resp  Min: 16  Max: 16)  SpO2: 97 % (SpO2  Min: 97 %  Max: 97 %)  Pulse: 80 (Pulse  Min: 74  Max: 80)  BP: 136/80  Systolic (24hrs), Av , Min:134 , Max:150     Diastolic (24hrs), Av, Min:80, Max:83      Mental Status Examination:  Oriented to:  Person/Self, Situation, Date and location  General: Awake and Alert  Appearance:  appears stated age, Posture is relaxed in chair, Grooming is adequate and Dressed in street clothing  Behavior:  calm, cooperative and engaged  Eye Contact:  adequate  Psychomotor:  no abnormal motor symptoms appreciated; no catatonia present  Speech:  appropriate volume/tone, talkative and with good articulation  Language: Fluent in English with appropriate syntax and vocabulary.  Mood:  \"good\"  Affect:  congruent with mood, labile, irritable and anxious  Thought Process:  coherent, linear and logical  Thought Content: No SI/HI/AH/VH; No apparent delusions  Associations:  intact  Insight:  fair due to recognizing need to go to CD, but highly externalizing regarding circumstances  Judgment:  fair due to willingness to engage with treatment and go to CD  Attention Span: adequate for conversation  Concentration:  grossly intact  Recent and Remote Memory:  not formally assessed  Fund of Knowledge: average     Allergies     Allergies   Allergen Reactions     Other Drug Allergy (See Comments)      Fake metal        Labs & Imaging     No results found for this or any previous visit (from the past 24 hour(s)).       Assessment   Diagnostic Impression:  See Mendenhall is a 31 year old gentleman with a past psychiatric diagnosis of  Bipolar 2 disorder, substance " use disorder, depressive disorder and anxiety disorder, PTSD and ADHD. He was admitted from the ER on 07/16/2022 where he was brought by the police due to concern for psychosis and SI with plan. This in the context of methamphetamine and cocaine use, unclear regarding medication non-adherence, he has significant history of substance use and psychosocial stressors including housing instability, unemployment, legal issues, trauma and chronic mental health issues.     He was brought to the ED with SI, erratic and impulsive behaviors and psychosis (paranoid delusions and disorganized behavior/thought process) in the context of methamphetamine and cocaine use.  His last psychiatric hospitalization was in April/2022 at [facility] for psychosis in context of substance use.  He is currently followed by PCP Erinn Maradiaga at [facility]. Current psychosocial stressors include legal issues, trauma, chronic mental health issues, family dynamics and medical issues which he has been coping with by using substances, acting out to self and acting out to others.  Patient's support system includes sister Samaria and friend Wilbert.      Substance use does appear to be playing a contributing role in the patient's presentation. Medical history does not appear to be significant, although chart review indicates history of chronic back pain, prescribed anabolic steroids that may be playing a role in presentation. In utero exposure and developmental delay need to be assessed.       Psychosocially history of trauma in childhood, long history of substance use, currently being unemployed and experiencing housing insecurity, legal issues, not being able to see his daughter represent both precipitating and perpetuating factors contributing to presentation.      The MSE is notable for some persisting paranoia, mood lability, being guarded, tangentiality and limited attention span/concentration, hyper-talkativeness difficult to  interrupt speech. He denies self injurious behaviors. His reported symptoms of VH, paranoid delusions and grossly disorganized thought process in the context of methamphetamine and cocaine use are suggestive of substance-induced psychosis, although definitive diagnosis is still in evolution and further collateral information from family/ outpatient provider is needed at this time; differential includes primary psychotic disorder exacerbated by substance use, PTSD, Bipolar Disorder, schizoaffective disorder.  Additionally, he has traits of impulsive behaviors which interfere with his ability to adhere with the treatment plan.       Optimization of medications to target these symptom clusters in addition to evaluation of adequate community supports will be targets for treatment during this admission.      Pt has history of previous attempts and substance use representing heightened acute risk for self/others patient warrants inpatient psychiatric hospitalization to maintain his safety until door to door treatment can be arranged. Disposition pending clinical stabilization, medication optimization and development of an appropriate discharge plan, including CD treatment referral upon discharge.     Principal Diagnoses:   Substance induced psychosis, now resolved  Amphetamine Use Disorder, severe  Cocaine Use Disorder, severe  Sedative Hypnotic Use Disorder, severe  Opiate Use Disorder, unspecified severity  Major depressive disorder, recurrent episode, moderate   Generalized anxiety disorder    Psychiatric & Medical course:  See Mendenhall was admitted to [facility] on a 72 hour hold, transferred to [facility] following a conflict with another patient. Patient in question has been removed from [facility], so now Mr. Mendenhall has returned to [facility]. His PTA prazosin was continued.      See Mendenhall continued to meet criteria for inpatient hospitalization medication optimization, inpatient stabilization, and  appropriate discharge planning.      7/19: added Seroquel 50 mg BEDTIME; 25 mg TID PRN   7/20: Quetiapine (Seroquel) increased to 100 mg, BEDTIME scheduled. Keep 25 mg TID PRN  7/20: melatonin 3mg scheduled per patient request  7/25: Pending confirmation of CARE referral and status on list with outpatient CM. If anticipated to be a prolonged wait for placement, will consider less restrictive options for CD.  7/26: Ongoing issues with outpatient CM Agatha not returning messages or answering calls from team or patient. Message left with CM supervisor given persistent issues reaching CM.  7/27: OP CM strongly advocating for CARE placement given patient's history of several failed treatments and violent/aggressive behaviors in context of substance use. OP CM did confirm that she placed CARE referral. Pt continues to voice frustration about length of stay and delay in CARE referral as he was informed that the referral was placed over one week ago.   7/28: Returned to [facility] from [facility]. CARE placement planned, likely will be available within two weeks. Mr. Mendenhall requests audience with  (Agatha) to explain circumstances of relapse and admission, even if it will not change the course of treatment or discharge to CARE facility, still wishes to clarify details. Changed scheduled SEROQUEL to ZYPREXA 10 mg at bedtime. SEROQUEL ordered PRN  7/29: CARE placement planned, but Mr. Mendenhall continues to request audience with  (Agatha). Should have been placed on list for CARE facility previously as he is on hospital day 13. Will ensure this is done today.  scheduled to visit unit and speak with Mr. Mendenhall on 8/1. Persistent anxiety, request for valium denied due to CD history and need to maintain CARE eligibility. Increased PRN ZYPREXA to accommodate maximum total dose of 40 mg/day (along with scheduled). Complains of shoulder pain s/p rotator cuff surgery and biceps tear in 12/2021,  request for tramadol denied due to CD history. Ordered PRN naproxen and PRN lidocaine cream instead.  8/1: Seasonal allergies; ordered PRN Cetirizine 10 mg   8/2: patient requested testosterone supplementation; ordered serum testosterone and SHBG  8/3: Added desvenlafaxine 25 mg daily and added PRN melatonin 3 mg to existing scheduled melatonin 3 mg QPM; Testosterone labs pending.  8/8: Testosterone  & Sex Hormone Binding Globulin levels within normal range; results shared with patient.     Plan   Today's Changes:     None  _______________________________________________________________________  Psychiatric Management:    Prazosin 1mg at bedtime    Quetiapine 100mg at bedtime    Civil commitment with Das    Additional Planning:    Continue to monitor for and stabilize: irritable, psychosis and substance use    Patient will be treated in therapeutic milieu with appropriate individual and group therapies as described.    Scheduled Medications (summary):  Current Facility-Administered Medications   Medication Dose Route Frequency     cetirizine  10 mg Oral Daily     desvenlafaxine succinate  25 mg Oral Daily     melatonin  3 mg Oral At Bedtime     multivitamin w/minerals  1 tablet Oral Daily     OLANZapine  10 mg Oral At Bedtime     polyethylene glycol  17 g Oral Daily     prazosin  1 mg Oral At Bedtime     PRN Medications (summary):  Current Facility-Administered Medications   Medication Dose Route Frequency     acetaminophen  650 mg Oral Q4H PRN     albuterol  2 puff Inhalation Q6H PRN     alum & mag hydroxide-simethicone  30 mL Oral Q4H PRN     hydrocortisone   Topical BID PRN     hydrOXYzine  25 mg Oral Q4H PRN     lidocaine 4%   Topical BID PRN     melatonin  3 mg Oral At Bedtime PRN     naproxen  250 mg Oral TID PRN     nicotine  4 mg Buccal Q1H PRN     OLANZapine  5-10 mg Oral TID PRN    Or     OLANZapine  10 mg Intramuscular TID PRN     QUEtiapine  100 mg Oral At Bedtime PRN     QUEtiapine  25-50 mg Oral TID  PRN     Legal Status:    Das    Committed     SIO:    No    Pt has not required locked seclusion or restraints in the past 24 hours to maintain safety, please refer to RN documentation for further details.    Safety Assessment:   Behavioral Orders   Procedures     Assault precautions     Code 1 - Restrict to Unit     Routine Programming     As clinically indicated     Status 15     Every 15 minutes.     Disposition:    Reason for ongoing admission:  Civil commitment pending placement for CD for direct transfer    Discharge location: TBD, pending clinical stabilization, medication optimization, and formulation of a safe discharge plan. Likely CARE, possibly in the next 14 days.      Discharge Medications: not ordered    Follow-up Appointments: not scheduled  ____________________________________________________________________  Pertinent Medical diagnoses and management:    None  ____________________________________________________________________  Patient seen and discussed with my resident physician, Dr. Theresa Reese MD and attending physician Dr. Galina De Jesus MD who are in agreement with my assessment and plan.    Nick Coates       The patient was seen and discussed with the medical student. I concur with the student's note.  Theresa Reese MD  PGY1 Psychiatry Resident    Attestation:

## 2022-08-09 PROCEDURE — 90853 GROUP PSYCHOTHERAPY: CPT

## 2022-08-09 PROCEDURE — 250N000013 HC RX MED GY IP 250 OP 250 PS 637

## 2022-08-09 PROCEDURE — 250N000013 HC RX MED GY IP 250 OP 250 PS 637: Performed by: PSYCHIATRY & NEUROLOGY

## 2022-08-09 PROCEDURE — H2032 ACTIVITY THERAPY, PER 15 MIN: HCPCS

## 2022-08-09 PROCEDURE — 124N000002 HC R&B MH UMMC

## 2022-08-09 PROCEDURE — 99232 SBSQ HOSP IP/OBS MODERATE 35: CPT | Mod: GC | Performed by: PSYCHIATRY & NEUROLOGY

## 2022-08-09 RX ADMIN — NICOTINE POLACRILEX 4 MG: 4 GUM, CHEWING BUCCAL at 14:40

## 2022-08-09 RX ADMIN — NICOTINE POLACRILEX 4 MG: 4 GUM, CHEWING BUCCAL at 10:20

## 2022-08-09 RX ADMIN — CETIRIZINE HYDROCHLORIDE 10 MG: 10 TABLET, FILM COATED ORAL at 10:20

## 2022-08-09 RX ADMIN — NAPROXEN 250 MG: 250 TABLET ORAL at 10:21

## 2022-08-09 RX ADMIN — MULTIPLE VITAMINS W/ MINERALS TAB 1 TABLET: TAB at 10:20

## 2022-08-09 RX ADMIN — OLANZAPINE 10 MG: 5 TABLET, FILM COATED ORAL at 12:57

## 2022-08-09 RX ADMIN — MELATONIN TAB 3 MG 3 MG: 3 TAB at 21:58

## 2022-08-09 RX ADMIN — ACETAMINOPHEN 325MG 650 MG: 325 TABLET ORAL at 21:59

## 2022-08-09 RX ADMIN — NAPROXEN 250 MG: 250 TABLET ORAL at 20:42

## 2022-08-09 RX ADMIN — NICOTINE POLACRILEX 4 MG: 4 GUM, CHEWING BUCCAL at 12:57

## 2022-08-09 RX ADMIN — PRAZOSIN HYDROCHLORIDE 1 MG: 1 CAPSULE ORAL at 20:41

## 2022-08-09 RX ADMIN — NICOTINE POLACRILEX 4 MG: 4 GUM, CHEWING BUCCAL at 20:41

## 2022-08-09 RX ADMIN — NICOTINE POLACRILEX 4 MG: 4 GUM, CHEWING BUCCAL at 18:49

## 2022-08-09 RX ADMIN — DESVENLAFAXINE 25 MG: 25 TABLET, EXTENDED RELEASE ORAL at 10:20

## 2022-08-09 RX ADMIN — OLANZAPINE 10 MG: 10 TABLET, FILM COATED ORAL at 21:58

## 2022-08-09 RX ADMIN — NICOTINE POLACRILEX 4 MG: 4 GUM, CHEWING BUCCAL at 17:24

## 2022-08-09 RX ADMIN — POLYETHYLENE GLYCOL 3350 17 G: 17 POWDER, FOR SOLUTION ORAL at 10:20

## 2022-08-09 ASSESSMENT — ACTIVITIES OF DAILY LIVING (ADL)
ADLS_ACUITY_SCORE: 28
HYGIENE/GROOMING: INDEPENDENT
ADLS_ACUITY_SCORE: 28
HYGIENE/GROOMING: INDEPENDENT
ADLS_ACUITY_SCORE: 28
ADLS_ACUITY_SCORE: 29
HYGIENE/GROOMING: INDEPENDENT
ADLS_ACUITY_SCORE: 28
DRESS: INDEPENDENT
DRESS: STREET CLOTHES;INDEPENDENT
DRESS: INDEPENDENT;STREET CLOTHES
ADLS_ACUITY_SCORE: 29
ADLS_ACUITY_SCORE: 29
LAUNDRY: WITH SUPERVISION
LAUNDRY: WITH SUPERVISION
ORAL_HYGIENE: INDEPENDENT

## 2022-08-09 NOTE — PROGRESS NOTES
"   08/09/22 1600   Group Therapy Session   Group Attendance attended group session   Time Session Began 1115   Time Session Ended 1200   Total Time patient participated (minutes) 45   Total # Attendees 5   Group Type expressive therapy   Group Topic Covered emotions/expression   Patient Response/Contribution cooperative with task       Art Therapy directive was to create a sculpture/object out of chaitanya to be used as a non verbal introduction of self to group.   Goals of directive: to explore aspects of self/identity, to express personal strengths and goals, media exploration (chaitanya), emotional regulation  Pt was an engaged participant, focused on working with chaitanya for the full duration of group. Pt created two identical pieces in the shape of stone tablets and shared with author and group that he was creating the \"Ten Commandments.\" Pt printed out the ten commandments and glued the paper onto the chaitanya. Pt was unable to share at this time due to meeting with other staff.  Pts mood was calm, pleasant participant.                    "

## 2022-08-09 NOTE — PLAN OF CARE
08/09/22 0938   Individualization/Patient Specific Goals   Patient Personal Strengths community support;expressive of needs;resourceful;intellectual cognitive skills;positive vocational history;resilient   Patient Vulnerabilities adverse childhood experience(s);substance abuse/addiction   Anxieties, Fears or Concerns Want to get out of hospital ASAP   Individualized Care Needs None   Patient-Specific Goals (Include Timeframe) 1. Evaluation/stabilization of mood/thought d/o symptoms 2. Safe with self/others 3. medication mgmt per MD's 4. CD treatment in place- locked facility 5. Legal issues clarified 6. Coordination of care with outpatient providers   Interprofessional Rounds   Participants CTC;nursing;patient;psychiatrist   Team Discussion   Participants Dr. De Jesus, Dr. Reese, Ashley Romero RN, Emiliana Colunga MA.LP, Med students   Progress Patient has been  stable- less maría sx's, calmer. Patient is less intrusive.  He has been cooperative with meds. No behavioral issues   Anticipated length of stay 14 days   Continued Stay Criteria/Rationale PLacement at CARE facility. Patient is on the wait list.   Medical/Physical None   Plan Patient is on wait list for CARE,  CTC coordinating care with Hollie CM, [facility]   Rationale for change in precautions or plan No change in plan/precautions   Anticipated Discharge Disposition substance use treatment;another healthcare facility

## 2022-08-09 NOTE — PROGRESS NOTES
----------------------------------------------------------------------------------------------------------  [facility]  Psychiatric Progress Note  Hospital Day #24    See Mendenhall MRN# [id number]   Age: 31 year old YOB: 1990   Date of Admission: 7/14/2022     Subjective   The patient was discussed with the treatment team and chart notes were reviewed.      Identifier: See Mendenhall is a 31 year old man with a past psychiatric diagnosis of  Bipolar 2 disorder, substance use disorder, depressive disorder, anxiety disorder, PTSD and ADHD.     Sleep:  5.75 hours (08/09/22 0700)   Prescribed Medications: Taken as prescribed  PRN Psychiatric Medications: acetaminophen, albuterol, alum & mag hydroxide-simethicone, hydrocortisone, hydrOXYzine, lidocaine 4%, nicotine, OLANZapine, QUEtiapine    Overnight Nursing Notes/Staff Report:  Patient slept well. No complaints. Some anxiety endorsed this weekend and PRNs provided.    Patient interview:  See Mendenhall was interviewed in the conference room. Appears comfortable dressed in street clothes. Speech rate was normal. Affect was calm and mood congruent. Spoke with medicare on phone today.  Pt reiterated that he wanted to start on suboxone.    -------------------------------------------------------------------------------------------------------------------------  Suicidal ideation: denies current or recent suicidal ideation or behaviors.  Homicidal ideation: denies current or recent homicidal ideation or behaviors.  Psychotic symptoms: Patient denies AH, VH, paranoia, delusions.     Medication side effects reported: No significant side effects.  Acute medical concerns: none     ROS   ROS was negative unless noted above.     Objective   Vitals:  Temp: 98.3  F (36.8  C) (Temp  Min: 96.5  F (35.8  C)  Max: 98.3  F (36.8  C))  Resp: 16 (Resp  Min: 16  Max: 16)  SpO2: 98 % (SpO2  Min: 97 %  Max: 98 %)  Pulse: 73 (Pulse  Min: 58  " Max: 80)  BP: 137/75  Systolic (24hrs), Av , Min:136 , Max:139     Diastolic (24hrs), Av, Min:75, Max:87      Mental Status Examination:  Oriented to:  Person/Self, Situation, Date and location  General: Awake and Alert  Appearance:  appears stated age, Posture is relaxed in chair, Grooming is adequate and Dressed in street clothing  Behavior:  calm, cooperative and engaged  Eye Contact:  adequate  Psychomotor:  no abnormal motor symptoms appreciated; no catatonia present  Speech:  appropriate volume/tone, talkative and with good articulation  Language: Fluent in English with appropriate syntax and vocabulary.  Mood:  \"good\"  Affect:  congruent with mood, labile, irritable and anxious  Thought Process:  coherent, linear and logical  Thought Content: No SI/HI/AH/VH; No apparent delusions  Associations:  intact  Insight:  fair due to recognizing need to go to CD, but highly externalizing regarding circumstances  Judgment:  fair due to willingness to engage with treatment and go to CD  Attention Span: adequate for conversation  Concentration:  grossly intact  Recent and Remote Memory:  not formally assessed  Fund of Knowledge: average     Allergies     Allergies   Allergen Reactions     Other Drug Allergy (See Comments)      Fake metal        Labs & Imaging     No results found for this or any previous visit (from the past 24 hour(s)).       Assessment   Diagnostic Impression:  See Mendenhall is a 31 year old gentleman with a past psychiatric diagnosis of  Bipolar 2 disorder, substance use disorder, depressive disorder and anxiety disorder, PTSD and ADHD. He was admitted from the ER on 2022 where he was brought by the police due to concern for psychosis and SI with plan. This in the context of methamphetamine and cocaine use, unclear regarding medication non-adherence, he has significant history of substance use and psychosocial stressors including housing instability, unemployment, legal issues, " trauma and chronic mental health issues.     He was brought to the ED with SI, erratic and impulsive behaviors and psychosis (paranoid delusions and disorganized behavior/thought process) in the context of methamphetamine and cocaine use.  His last psychiatric hospitalization was in April/2022 at [facility] for psychosis in context of substance use.  He is currently followed by PCP Erinn Maradiaga at [facility]. Current psychosocial stressors include legal issues, trauma, chronic mental health issues, family dynamics and medical issues which he has been coping with by using substances, acting out to self and acting out to others.  Patient's support system includes sister Samaria and friend Wilbert.      Substance use does appear to be playing a contributing role in the patient's presentation. Medical history does not appear to be significant, although chart review indicates history of chronic back pain, prescribed anabolic steroids that may be playing a role in presentation. In utero exposure and developmental delay need to be assessed.       Psychosocially history of trauma in childhood, long history of substance use, currently being unemployed and experiencing housing insecurity, legal issues, not being able to see his daughter represent both precipitating and perpetuating factors contributing to presentation.      The MSE is notable for some persisting paranoia, mood lability, being guarded, tangentiality and limited attention span/concentration, hyper-talkativeness difficult to interrupt speech. He denies self injurious behaviors. His reported symptoms of VH, paranoid delusions and grossly disorganized thought process in the context of methamphetamine and cocaine use are suggestive of substance-induced psychosis, although definitive diagnosis is still in evolution and further collateral information from family/ outpatient provider is needed at this time; differential includes primary psychotic  disorder exacerbated by substance use, PTSD, Bipolar Disorder, schizoaffective disorder.  Additionally, he has traits of impulsive behaviors which interfere with his ability to adhere with the treatment plan.       Optimization of medications to target these symptom clusters in addition to evaluation of adequate community supports will be targets for treatment during this admission.      Pt has history of previous attempts and substance use representing heightened acute risk for self/others patient warrants inpatient psychiatric hospitalization to maintain his safety until door to door treatment can be arranged. Disposition pending clinical stabilization, medication optimization and development of an appropriate discharge plan, including CD treatment referral upon discharge.     Principal Diagnoses:   Substance induced psychosis, now resolved  Amphetamine Use Disorder, severe  Cocaine Use Disorder, severe  Sedative Hypnotic Use Disorder, severe  Opiate Use Disorder, unspecified severity  Major depressive disorder, recurrent episode, moderate   Generalized anxiety disorder    Psychiatric & Medical course:  See Mendenhall was admitted to [facility] on a 72 hour hold, transferred to [facility] 12 following a conflict with another patient. Patient in question has been removed from Station 20, so now Mr. Mendenhall has returned to Station 20. His PTA prazosin was continued.      See Mendenhall continued to meet criteria for inpatient hospitalization medication optimization, inpatient stabilization, and appropriate discharge planning.      7/19: added Seroquel 50 mg BEDTIME; 25 mg TID PRN   7/20: Quetiapine (Seroquel) increased to 100 mg, BEDTIME scheduled. Keep 25 mg TID PRN  7/20: melatonin 3mg scheduled per patient request  7/25: Pending confirmation of CARE referral and status on list with outpatient CM. If anticipated to be a prolonged wait for placement, will consider less restrictive options for CD.  7/26: Ongoing  issues with outpatient CM Agatha not returning messages or answering calls from team or patient. Message left with CM supervisor given persistent issues reaching CM.  7/27: OP CM strongly advocating for CARE placement given patient's history of several failed treatments and violent/aggressive behaviors in context of substance use. OP CM did confirm that she placed CARE referral. Pt continues to voice frustration about length of stay and delay in CARE referral as he was informed that the referral was placed over one week ago.   7/28: Returned to Station 20 from [facility] 12. CARE placement planned, likely will be available within two weeks. Mr. Mendenhall requests audience with  (Agatha) to explain circumstances of relapse and admission, even if it will not change the course of treatment or discharge to CARE facility, still wishes to clarify details. Changed scheduled SEROQUEL to ZYPREXA 10 mg at bedtime. SEROQUEL ordered PRN  7/29: CARE placement planned, but Mr. Mendenhall continues to request audience with  (Agatha). Should have been placed on list for CARE facility previously as he is on hospital day 13. Will ensure this is done today.  scheduled to visit unit and speak with Mr. Mendenhall on 8/1. Persistent anxiety, request for valium denied due to CD history and need to maintain CARE eligibility. Increased PRN ZYPREXA to accommodate maximum total dose of 40 mg/day (along with scheduled). Complains of shoulder pain s/p rotator cuff surgery and biceps tear in 12/2021, request for tramadol denied due to CD history. Ordered PRN naproxen and PRN lidocaine cream instead.  8/1: Seasonal allergies; ordered PRN Cetirizine 10 mg   8/2: patient requested testosterone supplementation; ordered serum testosterone and SHBG  8/3: Added desvenlafaxine 25 mg daily and added PRN melatonin 3 mg to existing scheduled melatonin 3 mg QPM; Testosterone labs pending.  8/8: Testosterone  & Sex Hormone Binding  Globulin levels within normal range; results shared with patient.     Plan   Today's Changes:     None  _______________________________________________________________________  Psychiatric Management:    Prazosin 1mg at bedtime    Quetiapine 100mg at bedtime    Civil commitment with Das    Additional Planning:    Continue to monitor for and stabilize: irritable, psychosis and substance use    Patient will be treated in therapeutic milieu with appropriate individual and group therapies as described.    Scheduled Medications (summary):  Current Facility-Administered Medications   Medication Dose Route Frequency     cetirizine  10 mg Oral Daily     desvenlafaxine succinate  25 mg Oral Daily     melatonin  3 mg Oral At Bedtime     multivitamin w/minerals  1 tablet Oral Daily     OLANZapine  10 mg Oral At Bedtime     polyethylene glycol  17 g Oral Daily     prazosin  1 mg Oral At Bedtime     PRN Medications (summary):  Current Facility-Administered Medications   Medication Dose Route Frequency     acetaminophen  650 mg Oral Q4H PRN     albuterol  2 puff Inhalation Q6H PRN     alum & mag hydroxide-simethicone  30 mL Oral Q4H PRN     hydrocortisone   Topical BID PRN     hydrOXYzine  25 mg Oral Q4H PRN     lidocaine 4%   Topical BID PRN     melatonin  3 mg Oral At Bedtime PRN     naproxen  250 mg Oral TID PRN     nicotine  4 mg Buccal Q1H PRN     OLANZapine  5-10 mg Oral TID PRN    Or     OLANZapine  10 mg Intramuscular TID PRN     QUEtiapine  100 mg Oral At Bedtime PRN     QUEtiapine  25-50 mg Oral TID PRN     Legal Status:    Das    Committed     SIO:    No    Pt has not required locked seclusion or restraints in the past 24 hours to maintain safety, please refer to RN documentation for further details.    Safety Assessment:   Behavioral Orders   Procedures     Assault precautions     Code 1 - Restrict to Unit     Routine Programming     As clinically indicated     Status 15     Every 15 minutes.      Disposition:    Reason for ongoing admission:  Civil commitment pending placement for CD for direct transfer    Discharge location: TBD, pending clinical stabilization, medication optimization, and formulation of a safe discharge plan. Likely CARE, possibly within the next 14 days.      Discharge Medications: not ordered    Follow-up Appointments: not scheduled  ____________________________________________________________________  Pertinent Medical diagnoses and management:    None  ____________________________________________________________________  Patient seen and discussed with my resident physician, Dr. Theresa Reese MD and attending physician Dr. Galina De Jesus MD who are in agreement with my assessment and plan.    Nick Coates       I was present with the medical student who participated in the service and in the documentation of the note. I have verified the history and personally performed the exam and medical decision making. I agree with the assessment and plan of care as documented in the note. Galina De Jesus M.D., Ph.D.

## 2022-08-09 NOTE — PROGRESS NOTES
08/09/22 1100   Group Therapy Session   Group Attendance attended group session   Time Session Began 1515   Time Session Ended 1400   Total Time patient participated (minutes) 45   Total # Attendees 5   Group Type expressive therapy   Group Topic Covered emotions/expression   Patient Response/Contribution cooperative with task   Art Therapy directive was to create a feelings drawing using lines, shapes and colors. Pts were given a feelings wheel handout to help identify recent/current feelings.  Goals of directive: emotional expression, emotional regulation, media exploration  Pt worked on a drawing for the majority of group. Pt left before group began sharing their work. Pts mood was calm, pleasant participant.

## 2022-08-09 NOTE — PLAN OF CARE
"[facility] Occupational Therapy Group Intervention Note     08/08/22 1502   Group Therapy Session   Group Attendance attended group session   Time Session Began 1415   Time Session Ended 1500   Total Time patient participated (minutes) 45   Total # Attendees 8   Group Type psychoeducation   Group Topic Covered self-care activities;structured socialization;coping skills/lifestyle management   Group Session Detail education was provided on various ways to take care of one's emotional, physical, social, mental, and occupational self as protective factors.      Patient Response/Contribution cooperative with task;listened actively;discussed personal experience with topic   Patient Response Detail able to identify >2 self-care strategies within daily routine. I.e. \"set goals, surround myself with healthy people and places, stop drugs, and workout/take supplements\"      Opal Jaeger OT on 8/9/2022 at 9:05 AM    "

## 2022-08-10 PROCEDURE — 250N000013 HC RX MED GY IP 250 OP 250 PS 637

## 2022-08-10 PROCEDURE — H2032 ACTIVITY THERAPY, PER 15 MIN: HCPCS

## 2022-08-10 PROCEDURE — G0177 OPPS/PHP; TRAIN & EDUC SERV: HCPCS

## 2022-08-10 PROCEDURE — 99232 SBSQ HOSP IP/OBS MODERATE 35: CPT | Mod: GC | Performed by: PSYCHIATRY & NEUROLOGY

## 2022-08-10 PROCEDURE — 250N000013 HC RX MED GY IP 250 OP 250 PS 637: Performed by: PSYCHIATRY & NEUROLOGY

## 2022-08-10 PROCEDURE — 124N000002 HC R&B MH UMMC

## 2022-08-10 RX ORDER — LIDOCAINE 4 G/G
1 PATCH TOPICAL
Status: DISCONTINUED | OUTPATIENT
Start: 2022-08-10 | End: 2022-08-29 | Stop reason: HOSPADM

## 2022-08-10 RX ORDER — DESVENLAFAXINE 50 MG/1
50 TABLET, FILM COATED, EXTENDED RELEASE ORAL DAILY
Status: DISCONTINUED | OUTPATIENT
Start: 2022-08-11 | End: 2022-08-29 | Stop reason: HOSPADM

## 2022-08-10 RX ADMIN — PRAZOSIN HYDROCHLORIDE 1 MG: 1 CAPSULE ORAL at 21:34

## 2022-08-10 RX ADMIN — OLANZAPINE 10 MG: 10 TABLET, FILM COATED ORAL at 21:34

## 2022-08-10 RX ADMIN — NICOTINE POLACRILEX 4 MG: 4 GUM, CHEWING BUCCAL at 22:25

## 2022-08-10 RX ADMIN — NICOTINE POLACRILEX 4 MG: 4 GUM, CHEWING BUCCAL at 11:14

## 2022-08-10 RX ADMIN — DESVENLAFAXINE 25 MG: 25 TABLET, EXTENDED RELEASE ORAL at 09:03

## 2022-08-10 RX ADMIN — HYDROXYZINE HYDROCHLORIDE 25 MG: 25 TABLET ORAL at 21:37

## 2022-08-10 RX ADMIN — CETIRIZINE HYDROCHLORIDE 10 MG: 10 TABLET, FILM COATED ORAL at 09:03

## 2022-08-10 RX ADMIN — MULTIPLE VITAMINS W/ MINERALS TAB 1 TABLET: TAB at 09:03

## 2022-08-10 RX ADMIN — MELATONIN TAB 3 MG 3 MG: 3 TAB at 21:34

## 2022-08-10 RX ADMIN — NICOTINE POLACRILEX 4 MG: 4 GUM, CHEWING BUCCAL at 19:14

## 2022-08-10 RX ADMIN — NICOTINE POLACRILEX 4 MG: 4 GUM, CHEWING BUCCAL at 16:39

## 2022-08-10 RX ADMIN — NICOTINE POLACRILEX 4 MG: 4 GUM, CHEWING BUCCAL at 21:37

## 2022-08-10 RX ADMIN — LIDOCAINE PATCH 4% 1 PATCH: 40 PATCH TOPICAL at 12:03

## 2022-08-10 RX ADMIN — NICOTINE POLACRILEX 4 MG: 4 GUM, CHEWING BUCCAL at 13:23

## 2022-08-10 RX ADMIN — ACETAMINOPHEN 325MG 650 MG: 325 TABLET ORAL at 22:25

## 2022-08-10 RX ADMIN — NICOTINE POLACRILEX 4 MG: 4 GUM, CHEWING BUCCAL at 09:04

## 2022-08-10 ASSESSMENT — ACTIVITIES OF DAILY LIVING (ADL)
ADLS_ACUITY_SCORE: 29
LAUNDRY: WITH SUPERVISION
ADLS_ACUITY_SCORE: 29
HYGIENE/GROOMING: HANDWASHING;SHOWER;INDEPENDENT
ORAL_HYGIENE: INDEPENDENT
LAUNDRY: WITH SUPERVISION
DRESS: STREET CLOTHES;INDEPENDENT
DRESS: INDEPENDENT
ADLS_ACUITY_SCORE: 29
ORAL_HYGIENE: INDEPENDENT
ADLS_ACUITY_SCORE: 29
HYGIENE/GROOMING: HANDWASHING;SHOWER;INDEPENDENT
ADLS_ACUITY_SCORE: 29

## 2022-08-10 NOTE — PLAN OF CARE
Problem: Behavioral Health Plan of Care  Goal: Adheres to Safety Considerations for Self and Others  Outcome: Ongoing, Progressing  Flowsheets (Taken 8/9/2022 2123)  Adheres to Safety Considerations for Self and Others: making progress toward outcome  Intervention: Develop and Maintain Individualized Safety Plan  Recent Flowsheet Documentation  Taken 8/9/2022 1712 by Ede Mcnair RN  Safety Measures:    safety plan mutually developed    safety rounds completed     Problem: Suicidal Behavior  Goal: Suicidal Behavior is Absent or Managed  Outcome: Ongoing, Progressing  Flowsheets (Taken 8/9/2022 2123)  Mutually Determined Action Steps (Suicidal Behavior Absent/Managed): sets future-oriented goal     Problem: Behavioral Health Plan of Care  Goal: Optimized Coping Skills in Response to Life Stressors  Outcome: Ongoing, Progressing  Flowsheets (Taken 8/9/2022 2123)  Optimized Coping Skills in Response to Life Stressors: making progress toward outcome   Goal Outcome Evaluation:    Plan of Care Reviewed With: patient     Patient was pleasant, cooperative. Visible in milieu, social and interactive with peers and staff members. Spent most of his time watching tv, able to participate in group activities. Naproxen and Tylenol PRN given for right upper arm pain of 8/10, medication effective. Patient reported no anxiety or depression, no SIB noted. Medication compliant.

## 2022-08-10 NOTE — PLAN OF CARE
Assessment/Intervention/Current Symtoms and Care Coordination  The patient's care was discussed with the treatment team and chart notes were reviewed  Patient met with team.  He remains stable on the unit- no behavioral issues.  Patient has been attending groups, compliant with meds..    Patient has taken an active roll in taking care of personal issues, applying for general assistance.      Discharge Plan or Goal  CARE facility     Barriers to Discharge   Placement to CARE facility due to h/o repeatedly elopement from residential placements     Referral Status  Application sent to [employer] for economic assistance per patient rquest     Legal Status     Civil commitment with Das order

## 2022-08-10 NOTE — PLAN OF CARE
[facility] Occupational Therapy Group Intervention Note     08/10/22 1205   Group Therapy Session   Group Attendance attended group session   Time Session Began 1115   Time Session Ended 1200   Total Time patient participated (minutes) 45   Total # Attendees 4   Group Type recreation;psychoeducation   Group Topic Covered coping skills/lifestyle management;structured socialization;leisure exploration/use of leisure time   Group Session Detail Leisure exploration and participation group offered for increased intrinsic motivation to engage in social, non-obligatory occupations via a group game. Structured group was used to promote positive milieu interaction and collaboration.    Patient Response/Contribution cooperative with task;discussed personal experience with topic;expressed understanding of topic;listened actively;offered helpful suggestions to peers   Patient Response Detail Full participation. Congruent-bright affect. Social with peers; discussed personal experiences, values, and future goals.      Opal Jaeger OT on 8/10/2022 at 12:06 PM

## 2022-08-10 NOTE — PLAN OF CARE
08/09/22 2006   Group Therapy Session   Group Attendance attended group session   Time Session Began 1900   Time Session Ended 2000   Total Time patient participated (minutes) 60   Total # Attendees 7   Group Type psychotherapeutic   Group Topic Covered coping skills/lifestyle management   Group Session Detail Group participated in discussing the self-care wheel. Topics included the importance of balancing the different areas, what each one might look like for the individual, how they care for themselves within that wheel. After brainstorming and filling in the different wheel areas, group discussed what areas are harder for them and may require additional focus.   Patient Response/Contribution cooperative with task;listened actively;expressed understanding of topic;discussed personal experience with topic   Patient Response Detail Pt participated actively. When discussing meaning, pt spoke of Erickson Joyner's work, Man's search for Meaning.

## 2022-08-10 NOTE — PLAN OF CARE
"Late entry from 8/9/22 day shift Pt denies SI.  Slept in till about 10:30 am.  Requested am medication and PRN Nicotine gum and naproxen. PT reported right shoulder pain of a 10.  Pt noted carrying books and other things with that arm w/o appear ant difficulty.   Pt on phone social with select peers.    Problem: Plan of Care - These are the overarching goals to be used throughout the patient stay.    Goal: Plan of Care Review/Shift Note  Description: The Plan of Care Review/Shift note should be completed every shift.  The Outcome Evaluation is a brief statement about your assessment that the patient is improving, declining, or no change.  This information will be displayed automatically on your shift note.  8/9/2022 2216 by Ashley Smyth RN  Outcome: Ongoing, Not Progressing  8/9/2022 2215 by Ashley Smyth RN  Outcome: Ongoing, Not Progressing  Goal: Patient-Specific Goal (Individualized)  Description: You can add care plan individualizations to a care plan. Examples of Individualization might be:  \"Parent requests to be called daily at 9am for status\", \"I have a hard time hearing out of my right ear\", or \"Do not touch me to wake me up as it startles me\".  8/9/2022 2216 by Ashley Smyth RN  Outcome: Ongoing, Not Progressing  8/9/2022 2215 by Ashley Smyth RN  Outcome: Ongoing, Not Progressing  Goal: Absence of Hospital-Acquired Illness or Injury  8/9/2022 2216 by Ashley Smyth RN  Outcome: Ongoing, Not Progressing  8/9/2022 2215 by Ashley Smyth RN  Outcome: Ongoing, Not Progressing  Goal: Optimal Comfort and Wellbeing  8/9/2022 2216 by Ashley Smyth RN  Outcome: Ongoing, Not Progressing  8/9/2022 2215 by Ashley Smyth RN  Outcome: Ongoing, Not Progressing  Intervention: Monitor Pain and Promote Comfort  Recent Flowsheet Documentation  Taken 8/9/2022 1023 by Ashley Smyth RN  Pain Management Interventions: medication (see MAR)  Goal: Readiness for Transition of Care  8/9/2022 2216 by Ashley Smyth, " RN  Outcome: Ongoing, Not Progressing  8/9/2022 2215 by Ashley Smyth RN  Outcome: Ongoing, Not Progressing     Problem: Behavioral Health Plan of Care  Goal: Optimal Comfort and Wellbeing  8/9/2022 2216 by Ashley Smyth RN  Outcome: Ongoing, Not Progressing  8/9/2022 2215 by Ashley Smyth RN  Outcome: Ongoing, Not Progressing  Intervention: Monitor Pain and Promote Comfort  Recent Flowsheet Documentation  Taken 8/9/2022 1023 by Ashley Smyth RN  Pain Management Interventions: medication (see MAR)  Goal: Plan of Care Review  8/9/2022 2216 by Ashley Smyth RN  Outcome: Ongoing, Not Progressing  8/9/2022 2215 by Ashley Smyth RN  Outcome: Ongoing, Not Progressing  Goal: Patient-Specific Goal (Individualization)  8/9/2022 2216 by Ashley Smyth RN  Outcome: Ongoing, Not Progressing  8/9/2022 2215 by Ashley Smyth RN  Outcome: Ongoing, Not Progressing  Goal: Adheres to Safety Considerations for Self and Others  8/9/2022 2216 by Ashley Smyth RN  Outcome: Ongoing, Not Progressing  8/9/2022 2215 by Ashley Smyth RN  Outcome: Ongoing, Not Progressing  Intervention: Develop and Maintain Individualized Safety Plan  Recent Flowsheet Documentation  Taken 8/9/2022 1100 by Ashley Smyth RN  Safety Measures:    environmental rounds completed    safety rounds completed  Goal: Absence of New-Onset Illness or Injury  8/9/2022 2216 by Ashley Smyth RN  Outcome: Ongoing, Not Progressing  8/9/2022 2215 by Ashley Smyth RN  Outcome: Ongoing, Not Progressing  Intervention: Identify and Manage Fall Risk  Recent Flowsheet Documentation  Taken 8/9/2022 1100 by Ashley Smyth RN  Safety Measures:    environmental rounds completed    safety rounds completed  Goal: Optimized Coping Skills in Response to Life Stressors  8/9/2022 2216 by Ashley Smyth RN  Outcome: Ongoing, Not Progressing  8/9/2022 2215 by Ashley Smyth RN  Outcome: Ongoing, Not Progressing  Goal: Develops/Participates in Therapeutic [place] to Support Successful  Transition  8/9/2022 2216 by Ashley Smyth RN  Outcome: Ongoing, Not Progressing  8/9/2022 2215 by Ashley Smyth RN  Outcome: Ongoing, Not Progressing  Goal: Team Discussion  8/9/2022 2216 by Ashley Smyth RN  Outcome: Ongoing, Not Progressing  8/9/2022 2215 by Ashley Smyth RN  Outcome: Ongoing, Not Progressing   Goal Outcome Evaluation:

## 2022-08-10 NOTE — PROGRESS NOTES
"    ----------------------------------------------------------------------------------------------------------  [facility]  Psychiatric Progress Note  Hospital Day #25    See Mendenhall MRN# [id number]   Age: 31 year old YOB: 1990   Date of Admission: 7/14/2022     Subjective   The patient was discussed with the treatment team and chart notes were reviewed.      Identifier: See Mendenhall is a 31 year old man with a past psychiatric diagnosis of  Bipolar 2 disorder, substance use disorder, depressive disorder, anxiety disorder, PTSD and ADHD.     Sleep:  6.75 hours (08/10/22 0700)   Prescribed Medications: Taken as prescribed  PRN Psychiatric Medications: acetaminophen, albuterol, alum & mag hydroxide-simethicone, hydrocortisone, hydrOXYzine, lidocaine 4%, nicotine, OLANZapine, QUEtiapine    Overnight Nursing Notes/Staff Report:  Patient slept well. No complaints. Some anxiety endorsed this weekend and PRNs provided.    Patient interview:  See Mendenhall was interviewed in the conference room. Mood is \"all right.\" He states that he feels \"jet lagged\" and is \"not unhappy but not happy.\" He's interested in getting on to suboxone to reduce cravings. He states that he has struggled with opioid dependence and overdosed at the age of 18 years. He endorses low mood. Appears somewhat tearful at one point. Asked about CARE waitlist, we verified he is on the list. States his goal is to get placed by the 22nd so he can make it to biceps surgery, but realizes there are no guarantees for timeline.    For suboxone, explained we generally don't prescribe to patients that may need to enter chemical dependency programs and since he is going to CARE facility this could be an issue (CARE would likely support suboxone but could pose challenges for transition out of CARE facility, if he couldn't get suboxone he would likely go into withdrawal and this could put him at major risk of " "relapse). We warned that this medication is habit forming.     For mood complaints, we increased pristiq to 50 mg. Pt. Requested lidocaine patch rather than gel, and this was ordered.    -------------------------------------------------------------------------------------------------------------------------  Suicidal ideation: denies current or recent suicidal ideation or behaviors.  Homicidal ideation: denies current or recent homicidal ideation or behaviors.  Psychotic symptoms: Patient denies AH, VH, paranoia, delusions.     Medication side effects reported: No significant side effects.  Acute medical concerns: none     ROS   ROS was negative unless noted above.     Objective   Vitals:  Temp: 97.8  F (36.6  C) (Temp  Min: 97.8  F (36.6  C)  Max: 98  F (36.7  C))    (No data recorded)  SpO2: 97 % (SpO2  Min: 97 %  Max: 98 %)  Pulse: 71 (Pulse  Min: 71  Max: 71)  BP: 136/81  Systolic (24hrs), Av , Min:136 , Max:136     Diastolic (24hrs), Av, Min:81, Max:81      Mental Status Examination:  Oriented to:  Person/Self, Situation, Date and location  General: Awake and Alert  Appearance:  appears stated age, Posture is relaxed in chair, Grooming is adequate and Dressed in street clothing  Behavior:  calm, cooperative and engaged  Eye Contact:  adequate  Psychomotor:  no abnormal motor symptoms appreciated; no catatonia present  Speech:  appropriate volume/tone, talkative and with good articulation  Language: Fluent in English with appropriate syntax and vocabulary.  Mood:  \"good\"  Affect:  congruent with mood, labile, irritable and anxious  Thought Process:  coherent, linear and logical  Thought Content: No SI/HI/AH/VH; No apparent delusions  Associations:  intact  Insight:  fair due to recognizing need to go to CD, but highly externalizing regarding circumstances  Judgment:  fair due to willingness to engage with treatment and go to CD  Attention Span: adequate for conversation  Concentration:  grossly " intact  Recent and Remote Memory:  not formally assessed  Fund of Knowledge: average     Allergies     Allergies   Allergen Reactions     Other Drug Allergy (See Comments)      Fake metal        Labs & Imaging     No results found for this or any previous visit (from the past 24 hour(s)).       Assessment   Diagnostic Impression:  See Mendenhall is a 31 year old gentleman with a past psychiatric diagnosis of  Bipolar 2 disorder, substance use disorder, depressive disorder and anxiety disorder, PTSD and ADHD. He was admitted from the ER on 07/16/2022 where he was brought by the police due to concern for psychosis and SI with plan. This in the context of methamphetamine and cocaine use, unclear regarding medication non-adherence, he has significant history of substance use and psychosocial stressors including housing instability, unemployment, legal issues, trauma and chronic mental health issues.     He was brought to the ED with SI, erratic and impulsive behaviors and psychosis (paranoid delusions and disorganized behavior/thought process) in the context of methamphetamine and cocaine use.  His last psychiatric hospitalization was in April/2022 at [facility] for psychosis in context of substance use.  He is currently followed by PCP Erinn Maradiaga at [facility]. Current psychosocial stressors include legal issues, trauma, chronic mental health issues, family dynamics and medical issues which he has been coping with by using substances, acting out to self and acting out to others.  Patient's support system includes sister Samaria and friend Wilbert.      Substance use does appear to be playing a contributing role in the patient's presentation. Medical history does not appear to be significant, although chart review indicates history of chronic back pain, prescribed anabolic steroids that may be playing a role in presentation. In utero exposure and developmental delay need to be assessed.        Psychosocially history of trauma in childhood, long history of substance use, currently being unemployed and experiencing housing insecurity, legal issues, not being able to see his daughter represent both precipitating and perpetuating factors contributing to presentation.      The MSE is notable for some persisting paranoia, mood lability, being guarded, tangentiality and limited attention span/concentration, hyper-talkativeness difficult to interrupt speech. He denies self injurious behaviors. His reported symptoms of VH, paranoid delusions and grossly disorganized thought process in the context of methamphetamine and cocaine use are suggestive of substance-induced psychosis, although definitive diagnosis is still in evolution and further collateral information from family/ outpatient provider is needed at this time; differential includes primary psychotic disorder exacerbated by substance use, PTSD, Bipolar Disorder, schizoaffective disorder.  Additionally, he has traits of impulsive behaviors which interfere with his ability to adhere with the treatment plan.       Optimization of medications to target these symptom clusters in addition to evaluation of adequate community supports will be targets for treatment during this admission.      Pt has history of previous attempts and substance use representing heightened acute risk for self/others patient warrants inpatient psychiatric hospitalization to maintain his safety until door to door treatment can be arranged. Disposition pending clinical stabilization, medication optimization and development of an appropriate discharge plan, including CD treatment referral upon discharge.     Principal Diagnoses:   Substance induced psychosis, now resolved  Amphetamine Use Disorder, severe  Cocaine Use Disorder, severe  Sedative Hypnotic Use Disorder, severe  Opiate Use Disorder, unspecified severity  Major depressive disorder, recurrent episode, moderate   Generalized  anxiety disorder    Psychiatric & Medical course:  See Mendenhall was admitted to [facility] on a 72 hour hold, transferred to [facility] following a conflict with another patient. Patient in question has been removed from [facility], so now Mr. Mendenhall has returned to [facility]. His PTA prazosin was continued.      See Mendenhall continued to meet criteria for inpatient hospitalization medication optimization, inpatient stabilization, and appropriate discharge planning.      7/19: added Seroquel 50 mg BEDTIME; 25 mg TID PRN   7/20: Quetiapine (Seroquel) increased to 100 mg, BEDTIME scheduled. Keep 25 mg TID PRN  7/20: melatonin 3mg scheduled per patient request  7/25: Pending confirmation of CARE referral and status on list with outpatient CM. If anticipated to be a prolonged wait for placement, will consider less restrictive options for CD.  7/26: Ongoing issues with outpatient CM Agatha not returning messages or answering calls from team or patient. Message left with CM supervisor given persistent issues reaching CM.  7/27: OP CM strongly advocating for CARE placement given patient's history of several failed treatments and violent/aggressive behaviors in context of substance use. OP CM did confirm that she placed CARE referral. Pt continues to voice frustration about length of stay and delay in CARE referral as he was informed that the referral was placed over one week ago.   7/28: Returned to [facility] from [facility]. CARE placement planned, likely will be available within two weeks. Mr. Mendenhall requests audience with  (Agatha) to explain circumstances of relapse and admission, even if it will not change the course of treatment or discharge to CARE facility, still wishes to clarify details. Changed scheduled SEROQUEL to ZYPREXA 10 mg at bedtime. SEROQUEL ordered PRN  7/29: CARE placement planned, but Mr. Mendenhall continues to request audience with  (Agatha). Should have been  placed on list for CARE facility previously as he is on hospital day 13. Will ensure this is done today.  scheduled to visit unit and speak with Mr. Mendenhall on 8/1. Persistent anxiety, request for valium denied due to CD history and need to maintain CARE eligibility. Increased PRN ZYPREXA to accommodate maximum total dose of 40 mg/day (along with scheduled). Complains of shoulder pain s/p rotator cuff surgery and biceps tear in 12/2021, request for tramadol denied due to CD history. Ordered PRN naproxen and PRN lidocaine cream instead.  8/1: Seasonal allergies; ordered PRN Cetirizine 10 mg   8/2: patient requested testosterone supplementation; ordered serum testosterone and SHBG  8/3: Added desvenlafaxine 25 mg daily and added PRN melatonin 3 mg to existing scheduled melatonin 3 mg QPM; Testosterone labs pending.  8/8: Testosterone  & Sex Hormone Binding Globulin levels within normal range; results shared with patient.  8/10: increased desvenlafaxine to 50 mg daily, changed lidocaine gel to patch for biceps pain     Plan   Today's Changes:     Increased desvenlafaxine to 50 mg daily    Changed lidocaine gel to lidocaine patch  _______________________________________________________________________  Psychiatric Management:    Prazosin 1mg at bedtime    Quetiapine 100mg at bedtime    Civil commitment with Das    Additional Planning:    Continue to monitor for and stabilize: irritable, psychosis and substance use    Patient will be treated in therapeutic milieu with appropriate individual and group therapies as described.    Scheduled Medications (summary):  Current Facility-Administered Medications   Medication Dose Route Frequency     cetirizine  10 mg Oral Daily     [START ON 8/11/2022] desvenlafaxine  50 mg Oral Daily     lidocaine  1 patch Transdermal Q24H     lidocaine   Transdermal Q8H ALBA     melatonin  3 mg Oral At Bedtime     multivitamin w/minerals  1 tablet Oral Daily     OLANZapine  10 mg  Oral At Bedtime     polyethylene glycol  17 g Oral Daily     prazosin  1 mg Oral At Bedtime     PRN Medications (summary):  Current Facility-Administered Medications   Medication Dose Route Frequency     acetaminophen  650 mg Oral Q4H PRN     albuterol  2 puff Inhalation Q6H PRN     alum & mag hydroxide-simethicone  30 mL Oral Q4H PRN     hydrocortisone   Topical BID PRN     hydrOXYzine  25 mg Oral Q4H PRN     melatonin  3 mg Oral At Bedtime PRN     naproxen  250 mg Oral TID PRN     nicotine  4 mg Buccal Q1H PRN     OLANZapine  5-10 mg Oral TID PRN    Or     OLANZapine  10 mg Intramuscular TID PRN     QUEtiapine  100 mg Oral At Bedtime PRN     QUEtiapine  25-50 mg Oral TID PRN     Legal Status:    Das    Committed     SIO:    No    Pt has not required locked seclusion or restraints in the past 24 hours to maintain safety, please refer to RN documentation for further details.    Safety Assessment:   Behavioral Orders   Procedures     Assault precautions     Code 1 - Restrict to Unit     Routine Programming     As clinically indicated     Status 15     Every 15 minutes.     Disposition:    Reason for ongoing admission:  Civil commitment pending placement for CD for direct transfer    Discharge location: TBD, pending clinical stabilization, medication optimization, and formulation of a safe discharge plan. Likely CARE, possibly within the next 14 days.      Discharge Medications: not ordered    Follow-up Appointments: not scheduled  ____________________________________________________________________  Pertinent Medical diagnoses and management:    None  ____________________________________________________________________  Patient seen and discussed with my resident physician, Dr. Theresa Reese MD and attending physician Dr. Galina De Jesus MD who are in agreement with my assessment and plan.    Nick Coates     Attestation:    The patient was seen and discussed with the medical student. I concur with the  student's note.  Theresa Reese MD  PGY1 Psychiatry Resident    Attestation:  This patient has been seen and evaluated by me, Galina De Jesus MD.  I have discussed this patient with the house staff team including the resident and medical student and I agree with the findings and plan in this note.    I have reviewed today's vital signs, medications, labs and imaging. Galina De Jesus MD , PhD.

## 2022-08-10 NOTE — PLAN OF CARE
Problem: Plan of Care - These are the overarching goals to be used throughout the patient stay.    Goal: Plan of Care Review/Shift Note  Description: The Plan of Care Review/Shift note should be completed every shift.  The Outcome Evaluation is a brief statement about your assessment that the patient is improving, declining, or no change.  This information will be displayed automatically on your shift note.  Outcome: Ongoing, Progressing  Flowsheets (Taken 8/10/2022 0911)  Plan of Care Reviewed With: patient     Problem: Suicidal Behavior  Goal: Suicidal Behavior is Absent or Managed  Outcome: Ongoing, Progressing  Intervention: Provide Immediate and Ongoing Protective Physical Environment  Recent Flowsheet Documentation  Taken 8/10/2022 0911 by Sheridan Cervantes RN  Safe Transition Promotion: protective factors promoted   Goal Outcome Evaluation:    Plan of Care Reviewed With: patient      Pt appears guarded and minimally engages with peers. However, pt visible in the milieu majority of the time for meals, watching TV and attended groups. Wake up late this morning but had a late breakfast. Calm, cooperative and med complaint. Denies depression, anxiety, SI/HI, hallucinations and other psych symptoms. Pt requested PRN Nicotine gum hourly. Good intake of foods and fluids. Endorsed pain on right arm at 8. Lidocaine patch applied.

## 2022-08-10 NOTE — PLAN OF CARE
[facility] Occupational Therapy Group Intervention Note     08/10/22 1422   Group Therapy Session   Group Attendance attended group session   Time Session Began 1330   Time Session Ended 1420   Total Time patient participated (minutes) 50   Total # Attendees 3   Group Type task skill;life skill   Group Topic Covered coping skills/lifestyle management   Group Session Detail clinic - coping skill exploration, creative expression within personally meaningful activities, and observation of social, cognitive, and kinesthetic performance skills   Patient Response/Contribution cooperative with task;discussed personal experience with topic;organized   Patient Response Detail IND to initiate and follow-through with various detailed coloring projects. Demonstrated great concentration. Expressed interest in obtaining an order from MD for markers on the unit with the intention to unitize supplies to engage in such significant coping activity during down time. Writer supported this request, however will be consulting MD prior to approval.      Opal Jaeger OT on 8/10/2022 at 2:23 PM

## 2022-08-11 PROCEDURE — 250N000013 HC RX MED GY IP 250 OP 250 PS 637: Performed by: PSYCHIATRY & NEUROLOGY

## 2022-08-11 PROCEDURE — 250N000013 HC RX MED GY IP 250 OP 250 PS 637

## 2022-08-11 PROCEDURE — 99232 SBSQ HOSP IP/OBS MODERATE 35: CPT | Mod: GC | Performed by: PSYCHIATRY & NEUROLOGY

## 2022-08-11 PROCEDURE — 90853 GROUP PSYCHOTHERAPY: CPT

## 2022-08-11 PROCEDURE — G0177 OPPS/PHP; TRAIN & EDUC SERV: HCPCS

## 2022-08-11 PROCEDURE — 124N000002 HC R&B MH UMMC

## 2022-08-11 RX ADMIN — MELATONIN TAB 3 MG 3 MG: 3 TAB at 21:34

## 2022-08-11 RX ADMIN — POLYETHYLENE GLYCOL 3350 17 G: 17 POWDER, FOR SOLUTION ORAL at 09:36

## 2022-08-11 RX ADMIN — MULTIPLE VITAMINS W/ MINERALS TAB 1 TABLET: TAB at 09:32

## 2022-08-11 RX ADMIN — NICOTINE POLACRILEX 4 MG: 4 GUM, CHEWING BUCCAL at 18:05

## 2022-08-11 RX ADMIN — OLANZAPINE 10 MG: 10 TABLET, FILM COATED ORAL at 21:34

## 2022-08-11 RX ADMIN — NICOTINE POLACRILEX 4 MG: 4 GUM, CHEWING BUCCAL at 21:36

## 2022-08-11 RX ADMIN — PRAZOSIN HYDROCHLORIDE 1 MG: 1 CAPSULE ORAL at 21:33

## 2022-08-11 RX ADMIN — NICOTINE POLACRILEX 4 MG: 4 GUM, CHEWING BUCCAL at 23:17

## 2022-08-11 RX ADMIN — NICOTINE POLACRILEX 4 MG: 4 GUM, CHEWING BUCCAL at 16:38

## 2022-08-11 RX ADMIN — LIDOCAINE PATCH 4% 1 PATCH: 40 PATCH TOPICAL at 11:22

## 2022-08-11 RX ADMIN — DESVENLAFAXINE SUCCINATE 50 MG: 50 TABLET, EXTENDED RELEASE ORAL at 09:31

## 2022-08-11 RX ADMIN — QUETIAPINE FUMARATE 100 MG: 100 TABLET ORAL at 21:33

## 2022-08-11 RX ADMIN — HYDROXYZINE HYDROCHLORIDE 25 MG: 25 TABLET ORAL at 23:17

## 2022-08-11 RX ADMIN — ALBUTEROL SULFATE 2 PUFF: 90 AEROSOL, METERED RESPIRATORY (INHALATION) at 23:17

## 2022-08-11 RX ADMIN — QUETIAPINE FUMARATE 50 MG: 25 TABLET ORAL at 16:37

## 2022-08-11 RX ADMIN — OLANZAPINE 10 MG: 5 TABLET, FILM COATED ORAL at 14:44

## 2022-08-11 RX ADMIN — NICOTINE POLACRILEX 4 MG: 4 GUM, CHEWING BUCCAL at 09:38

## 2022-08-11 RX ADMIN — CETIRIZINE HYDROCHLORIDE 10 MG: 10 TABLET, FILM COATED ORAL at 09:32

## 2022-08-11 RX ADMIN — NICOTINE POLACRILEX 4 MG: 4 GUM, CHEWING BUCCAL at 14:45

## 2022-08-11 RX ADMIN — ACETAMINOPHEN 325MG 650 MG: 325 TABLET ORAL at 16:37

## 2022-08-11 ASSESSMENT — ACTIVITIES OF DAILY LIVING (ADL)
ADLS_ACUITY_SCORE: 29
HYGIENE/GROOMING: SHOWER;INDEPENDENT
LAUNDRY: WITH SUPERVISION
ADLS_ACUITY_SCORE: 29
DRESS: STREET CLOTHES;INDEPENDENT
ORAL_HYGIENE: INDEPENDENT
ADLS_ACUITY_SCORE: 29
ADLS_ACUITY_SCORE: 29
DRESS: INDEPENDENT
ADLS_ACUITY_SCORE: 29
ORAL_HYGIENE: INDEPENDENT
HYGIENE/GROOMING: HANDWASHING;SHOWER;INDEPENDENT
ADLS_ACUITY_SCORE: 29
LAUNDRY: WITH SUPERVISION

## 2022-08-11 NOTE — PROGRESS NOTES
"    ----------------------------------------------------------------------------------------------------------  [facility]  Psychiatric Progress Note  Hospital Day #26    See Mendenhall MRN# [id number]   Age: 31 year old YOB: 1990   Date of Admission: 7/14/2022     Subjective   The patient was discussed with the treatment team and chart notes were reviewed.      Identifier: See Mendenhall is a 31 year old man with a past psychiatric diagnosis of  Bipolar 2 disorder, substance use disorder, depressive disorder, anxiety disorder, PTSD and ADHD.     Sleep:  6.75 hours (08/11/22 0608)   Prescribed Medications: Taken as prescribed  PRN Psychiatric Medications: acetaminophen, albuterol, alum & mag hydroxide-simethicone, hydrocortisone, hydrOXYzine, lidocaine 4%, nicotine, OLANZapine, QUEtiapine    Overnight Nursing Notes/Staff Report:  Patient slept well. No complaints. Some anxiety endorsed this weekend and PRNs provided.    Patient interview:  See Mendenhall was interviewed in the conference room. Mood is \"feeling depressed.\" He states that he feels \"not unhappy but not happy.\" Appears noticably down, uncomfortable, possibly tearful. Some dynamic range in mood as improves slightly when aunt's visit planned for August 20 is brought up. He reiterates his interest in getting prescription for suboxone to reduce cravings stating that he overdosed before and doesn't want to again. Then asks \"It really keeps you from overdosing?\" Treatment team reassured we are still trying to set up a consult that can follow/prescribe once he is outpatient. Expresses desire to talk to  or supervisor about alternative placement. Supervisor phone number provided. Also discussed possibility of going outside which he is very excited about. Discussed biceps pain, lidocaine patch helped with sleep and has a surgery appointment scheduled for August 22.     No medication changes " "today.    -------------------------------------------------------------------------------------------------------------------------  Suicidal ideation: denies current or recent suicidal ideation or behaviors.  Homicidal ideation: denies current or recent homicidal ideation or behaviors.  Psychotic symptoms: Patient denies AH, VH, paranoia, delusions.     Medication side effects reported: No significant side effects.  Acute medical concerns: none     ROS   ROS was negative unless noted above.     Objective   Vitals:  Temp: 97.7  F (36.5  C) (Temp  Min: 97.7  F (36.5  C)  Max: 97.7  F (36.5  C))  Resp: 16 (Resp  Min: 16  Max: 16)  SpO2: 98 % (SpO2  Min: 98 %  Max: 98 %)    (No data recorded)  BP: 136/86  Systolic (24hrs), Av , Min:136 , Max:136     Diastolic (24hrs), Av, Min:86, Max:86      Mental Status Examination:  Oriented to:  Person/Self, Situation, Date and location  General: Awake and Alert  Appearance:  appears stated age, Posture is relaxed in chair, Grooming is adequate and Dressed in street clothing  Behavior:  calm, cooperative and engaged  Eye Contact:  adequate  Psychomotor:  no abnormal motor symptoms appreciated; no catatonia present  Speech:  appropriate volume/tone, talkative and with good articulation  Language: Fluent in English with appropriate syntax and vocabulary.  Mood:  \"feeling depressed\"  Affect:  congruent with mood, labile, irritable and anxious  Thought Process:  coherent, linear and logical  Thought Content: No SI/HI/AH/VH; No apparent delusions  Associations:  intact  Insight:  fair due to recognizing need to go to CD, but highly externalizing regarding circumstances  Judgment:  fair due to willingness to engage with treatment and go to CD  Attention Span: adequate for conversation  Concentration:  grossly intact  Recent and Remote Memory:  not formally assessed  Fund of Knowledge: average     Allergies     Allergies   Allergen Reactions     Other Drug Allergy (See Comments)  "     Fake metal        Labs & Imaging     No results found for this or any previous visit (from the past 24 hour(s)).       Assessment   Diagnostic Impression:  See Mendenhall is a 31 year old gentleman with a past psychiatric diagnosis of  Bipolar 2 disorder, substance use disorder, depressive disorder and anxiety disorder, PTSD and ADHD. He was admitted from the ER on 07/16/2022 where he was brought by the police due to concern for psychosis and SI with plan. This in the context of methamphetamine and cocaine use, unclear regarding medication non-adherence, he has significant history of substance use and psychosocial stressors including housing instability, unemployment, legal issues, trauma and chronic mental health issues.     He was brought to the ED with SI, erratic and impulsive behaviors and psychosis (paranoid delusions and disorganized behavior/thought process) in the context of methamphetamine and cocaine use.  His last psychiatric hospitalization was in April/2022 at [facility] for psychosis in context of substance use.  He is currently followed by PCP Erinn Maradiaga at [facility]. Current psychosocial stressors include legal issues, trauma, chronic mental health issues, family dynamics and medical issues which he has been coping with by using substances, acting out to self and acting out to others.  Patient's support system includes sister Samaria and friend Wilbert.      Substance use does appear to be playing a contributing role in the patient's presentation. Medical history does not appear to be significant, although chart review indicates history of chronic back pain, prescribed anabolic steroids that may be playing a role in presentation. In utero exposure and developmental delay need to be assessed.       Psychosocially history of trauma in childhood, long history of substance use, currently being unemployed and experiencing housing insecurity, legal issues, not being able to see  his daughter represent both precipitating and perpetuating factors contributing to presentation.      The MSE is notable for some persisting paranoia, mood lability, being guarded, tangentiality and limited attention span/concentration, hyper-talkativeness difficult to interrupt speech. He denies self injurious behaviors. His reported symptoms of VH, paranoid delusions and grossly disorganized thought process in the context of methamphetamine and cocaine use are suggestive of substance-induced psychosis, although definitive diagnosis is still in evolution and further collateral information from family/ outpatient provider is needed at this time; differential includes primary psychotic disorder exacerbated by substance use, PTSD, Bipolar Disorder, schizoaffective disorder.  Additionally, he has traits of impulsive behaviors which interfere with his ability to adhere with the treatment plan.       Optimization of medications to target these symptom clusters in addition to evaluation of adequate community supports will be targets for treatment during this admission.      Pt has history of previous attempts and substance use representing heightened acute risk for self/others patient warrants inpatient psychiatric hospitalization to maintain his safety until door to door treatment can be arranged. Disposition pending clinical stabilization, medication optimization and development of an appropriate discharge plan, including CD treatment referral upon discharge.     Principal Diagnoses:   Substance induced psychosis, now resolved  Amphetamine Use Disorder, severe  Cocaine Use Disorder, severe  Sedative Hypnotic Use Disorder, severe  Opiate Use Disorder, unspecified severity  Major depressive disorder, recurrent episode, moderate   Generalized anxiety disorder    Psychiatric & Medical course:  See Mendenhall was admitted to [facility] on a 72 hour hold, transferred to Station 12 following a conflict with another patient.  Patient in question has been removed from Station 20, so now Mr. Mendenhall has returned to Station 20. His PTA prazosin was continued.      See Mendenhall continued to meet criteria for inpatient hospitalization medication optimization, inpatient stabilization, and appropriate discharge planning.      7/19: added Seroquel 50 mg BEDTIME; 25 mg TID PRN   7/20: Quetiapine (Seroquel) increased to 100 mg, BEDTIME scheduled. Keep 25 mg TID PRN  7/20: melatonin 3mg scheduled per patient request  7/25: Pending confirmation of CARE referral and status on list with outpatient CM. If anticipated to be a prolonged wait for placement, will consider less restrictive options for CD.  7/26: Ongoing issues with outpatient CM Agatha not returning messages or answering calls from team or patient. Message left with CM supervisor given persistent issues reaching CM.  7/27: OP CM strongly advocating for CARE placement given patient's history of several failed treatments and violent/aggressive behaviors in context of substance use. OP CM did confirm that she placed CARE referral. Pt continues to voice frustration about length of stay and delay in CARE referral as he was informed that the referral was placed over one week ago.   7/28: Returned to [facility] 20 from [facility] 12. CARE placement planned, likely will be available within two weeks. Mr. Mendenhall requests audience with  (Agatha) to explain circumstances of relapse and admission, even if it will not change the course of treatment or discharge to CARE facility, still wishes to clarify details. Changed scheduled SEROQUEL to ZYPREXA 10 mg at bedtime. SEROQUEL ordered PRN  7/29: CARE placement planned, but Mr. Mendenhall continues to request audience with  (Agatha). Should have been placed on list for CARE facility previously as he is on hospital day 13. Will ensure this is done today.  scheduled to visit unit and speak with Mr. Mendenhall on 8/1. Persistent  anxiety, request for valium denied due to CD history and need to maintain CARE eligibility. Increased PRN ZYPREXA to accommodate maximum total dose of 40 mg/day (along with scheduled). Complains of shoulder pain s/p rotator cuff surgery and biceps tear in 12/2021, request for tramadol denied due to CD history. Ordered PRN naproxen and PRN lidocaine cream instead.  8/1: Seasonal allergies; ordered PRN Cetirizine 10 mg   8/2: patient requested testosterone supplementation; ordered serum testosterone and SHBG  8/3: Added desvenlafaxine 25 mg daily and added PRN melatonin 3 mg to existing scheduled melatonin 3 mg QPM; Testosterone labs pending.  8/8: Testosterone  & Sex Hormone Binding Globulin levels within normal range; results shared with patient.  8/10: increased desvenlafaxine to 50 mg daily, changed lidocaine gel to patch for biceps pain     Plan   Today's Changes:     Increased desvenlafaxine to 50 mg daily    Changed lidocaine gel to lidocaine patch  _______________________________________________________________________  Psychiatric Management:    Prazosin 1mg at bedtime    Quetiapine 100mg at bedtime    Civil commitment with Das    Additional Planning:    Continue to monitor for and stabilize: irritable, psychosis and substance use    Patient will be treated in therapeutic milieu with appropriate individual and group therapies as described.    Scheduled Medications (summary):  Current Facility-Administered Medications   Medication Dose Route Frequency     cetirizine  10 mg Oral Daily     desvenlafaxine  50 mg Oral Daily     lidocaine  1 patch Transdermal Q24H     lidocaine   Transdermal Q8H ALBA     melatonin  3 mg Oral At Bedtime     multivitamin w/minerals  1 tablet Oral Daily     OLANZapine  10 mg Oral At Bedtime     polyethylene glycol  17 g Oral Daily     prazosin  1 mg Oral At Bedtime     PRN Medications (summary):  Current Facility-Administered Medications   Medication Dose Route Frequency      acetaminophen  650 mg Oral Q4H PRN     albuterol  2 puff Inhalation Q6H PRN     alum & mag hydroxide-simethicone  30 mL Oral Q4H PRN     hydrocortisone   Topical BID PRN     hydrOXYzine  25 mg Oral Q4H PRN     melatonin  3 mg Oral At Bedtime PRN     naproxen  250 mg Oral TID PRN     nicotine  4 mg Buccal Q1H PRN     OLANZapine  5-10 mg Oral TID PRN    Or     OLANZapine  10 mg Intramuscular TID PRN     QUEtiapine  100 mg Oral At Bedtime PRN     QUEtiapine  25-50 mg Oral TID PRN     Legal Status:    Das    Committed     SIO:    No    Pt has not required locked seclusion or restraints in the past 24 hours to maintain safety, please refer to RN documentation for further details.    Safety Assessment:   Behavioral Orders   Procedures     Assault precautions     Code 1 - Restrict to Unit     Routine Programming     As clinically indicated     Status 15     Every 15 minutes.     Disposition:    Reason for ongoing admission:  Civil commitment pending placement for CD for direct transfer    Discharge location: TBD, pending clinical stabilization, medication optimization, and formulation of a safe discharge plan. Likely CARE, possibly within the next 14 days.      Discharge Medications: not ordered    Follow-up Appointments: not scheduled  ____________________________________________________________________  Pertinent Medical diagnoses and management:    None  ____________________________________________________________________  Patient seen and discussed with my resident physician, Dr. Theresa Reese MD and attending physician Dr. Galina De Jesus MD who are in agreement with my assessment and plan.    Nick Coates     Attestation:    The patient was seen and discussed with the medical student. I concur with the student's note.  Theresa Reese MD  PGY1 Psychiatry Resident    Attestation:  This patient has been seen and evaluated by me, Galina De Jesus MD.  I have discussed this patient with the house staff  team including the resident and medical student and I agree with the findings and plan in this note.    I have reviewed today's vital signs, medications, labs and imaging. Galina De Jesus MD , PhD.

## 2022-08-11 NOTE — PLAN OF CARE
08/11/22 1451   Group Therapy Session   Group Attendance attended group session   Time Session Began 0200   Time Session Ended 0245   Total Time patient participated (minutes) 45   Total # Attendees 3   Group Type psychoeducation   Group Topic Covered balanced lifestyle;community integration;co-occurring illness;medication management   Patient Response/Contribution cooperative with task;organized   Patient Response Detail Patient demonstrating increased insight/organized thought process.

## 2022-08-11 NOTE — PLAN OF CARE
"  Problem: Plan of Care - These are the overarching goals to be used throughout the patient stay.    Goal: Plan of Care Review/Shift Note  Description: The Plan of Care Review/Shift note should be completed every shift.  The Outcome Evaluation is a brief statement about your assessment that the patient is improving, declining, or no change.  This information will be displayed automatically on your shift note.  8/10/2022 2201 by Sheridan Cervantes RN  Outcome: Ongoing, Progressing  Flowsheets (Taken 8/10/2022 1900)  Plan of Care Reviewed With: patient  8/10/2022 0946 by Sheridan Cervantes RN  Outcome: Ongoing, Progressing  Flowsheets (Taken 8/10/2022 0911)  Plan of Care Reviewed With: patient     Problem: Suicidal Behavior  Goal: Suicidal Behavior is Absent or Managed  8/10/2022 2201 by Sheridan Cervantes RN  Outcome: Ongoing, Progressing  8/10/2022 0946 by Sheridan Cervantes RN  Outcome: Ongoing, Progressing  Intervention: Provide Immediate and Ongoing Protective Physical Environment  Recent Flowsheet Documentation  Taken 8/10/2022 1900 by Sheridan Cervantes RN  Safe Transition Promotion: protective factors promoted  Taken 8/10/2022 0911 by Sheridan Cervantes RN  Safe Transition Promotion: protective factors promoted   Goal Outcome Evaluation:    Plan of Care Reviewed With: patient     Pt visible in the milieu majority of the time for meals, watching TV and attended groups. Pt was observed engaging and socializing with peers. He is calm, cooperative and med complaint. Denies depression, SI/HI, hallucinations and other psych symptoms. Pt requested PRN Nicotine gum hourly. Pt endorsed \"some\" anxiety,  PRN Hydroxyzine was given with HS meds. Good intake of foods and fluids. Endorsed pain on right arm at 3. Lidocaine patch in place.            "

## 2022-08-11 NOTE — PROGRESS NOTES
08/10/22 2100   Group Therapy Session   Group Attendance attended group session   Time Session Began 2000   Time Session Ended 2050   Total Time patient participated (minutes) 50   Total # Attendees 4   Group Type recreation   Group Topic Covered leisure exploration/use of leisure time   Group Session Detail TR leisure group   Patient Response/Contribution cooperative with task   Patient Response Detail Pt attended the structured Therapeutic Recreation group, participating in a group activity. Pt participated in group discussion, leisure participation, and social engagement to gain self-esteem, manage behaviors, improve social skills, decrease isolation, and reduce anxiety/depression.   Pt remained focused and engaged throughout group activity.  Pt mood was sociable and was appropriate with interactions, contributing to the clues and descriptions throughout the activity. Pt was a full participant for the duration of the group. Pt had a bright affect, often laughing and joking with peers appropriately.

## 2022-08-11 NOTE — PLAN OF CARE
Assessment/Intervention/Current Symtoms and Care Coordination  The patient's care was discussed with the treatment team and chart notes were reviewed  Patient met with team.  He remains stable on the unit- no behavioral issues.  Patient has been attending groups, compliant with meds..    [facility] called [facility] to inquire about wait list.  Writer was informed wait is very long - admission wait time  will be greater than 1 mth.  [facility] emailed patient's [facility] CM to inform her of this significant wait- and the fact that patient is psychiatrically stable.  [facility] requested that CM consider alternative placement. Awaiting response.      Discharge Plan or Goal  CARE facility  VS residential treatment     Barriers to Discharge   Placement to CARE facility due to h/o repeatedly elopement from residential placements     Referral Status  None today     Legal Status     Civil commitment with Das order

## 2022-08-11 NOTE — PLAN OF CARE
Patient appears sleeping during rounds. Patient sleeps on the floor mat per his preference. Patient had 6.75 hours of total hours of sleep. No behavioral or any safety concerns @ this time. Will continue to monitor the patient and provide therapeutic intervention as needed. Will continue with current plan of care.   Problem: Sleep Disturbance  Goal: Adequate Sleep/Rest  Outcome: Ongoing, Progressing

## 2022-08-11 NOTE — PROGRESS NOTES
Behavioral Health  Note   Behavioral Health  Spirituality Group Note     Unit 20    Name: See Mendenhall    YOB: 1990   MRN: [id number]    Age: 31 year old     Patient attended -led group, which included discussion of spirituality, coping with illness and building resilience.   Patient attended group for 1.0 hrs.   patient demonstrated an appreciation of topic's application for their personal circumstances.     Raegan [facility]  Staff    Page [phone]

## 2022-08-11 NOTE — PLAN OF CARE
Goal Outcome Evaluation:    Plan of Care Reviewed With: patient     Problem: Plan of Care - These are the overarching goals to be used throughout the patient stay.    Goal: Optimal Comfort and Wellbeing  Outcome: Ongoing, Progressing  Intervention: Provide Person-Centered Care  Recent Flowsheet Documentation  Taken 8/11/2022 1000 by Oleg Benítez RN  Trust Relationship/Rapport:    care explained    choices provided    emotional support provided    empathic listening provided    questions answered    questions encouraged    reassurance provided    thoughts/feelings acknowledged      Patient alert and oriented x 4. Patient denied SI/SIB/HI/AH/VH. Patient got up late today, therefore, medication was administered late per patient's request. Otherwise, patient was calm and cooperative with cares. Patient attended groups. Patient noted to be socializing with peers. Patient denied anxiety and depression. Will continue to monitor and update as needed.         Update 1446: Patient requested and received prn Zyprexa for increased agitation. Patient rated his anxiety 7/10 and preferred not to elaborate.

## 2022-08-11 NOTE — PLAN OF CARE
[facility] Occupational Therapy Group Intervention Note     08/11/22 1220   Group Therapy Session   Group Attendance attended group session   Time Session Began 1115   Time Session Ended 1200   Total Time patient participated (minutes) 45   Total # Attendees 3   Group Type task skill;life skill   Group Topic Covered coping skills/lifestyle management   Group Session Detail clinic - coping skill exploration, creative expression within personally meaningful activities, and observation of social, cognitive, and kinesthetic performance skills   Patient Response/Contribution cooperative with task;organized   Patient Response Detail IND to initiate and attend to task for full duration of group. Demonstrated consistent performance skills as observed on previous dates. Continues to ruminate over stressors (current being insurance claim), however, remains optimistic with a congruent-bright affect.     Opal Jaeger OT on 8/11/2022 at 12:21 PM

## 2022-08-12 PROCEDURE — 250N000013 HC RX MED GY IP 250 OP 250 PS 637

## 2022-08-12 PROCEDURE — 250N000013 HC RX MED GY IP 250 OP 250 PS 637: Performed by: PSYCHIATRY & NEUROLOGY

## 2022-08-12 PROCEDURE — H2032 ACTIVITY THERAPY, PER 15 MIN: HCPCS

## 2022-08-12 PROCEDURE — 124N000002 HC R&B MH UMMC

## 2022-08-12 PROCEDURE — 99232 SBSQ HOSP IP/OBS MODERATE 35: CPT | Mod: GC | Performed by: PSYCHIATRY & NEUROLOGY

## 2022-08-12 PROCEDURE — 90853 GROUP PSYCHOTHERAPY: CPT

## 2022-08-12 RX ADMIN — OLANZAPINE 5 MG: 5 TABLET, FILM COATED ORAL at 14:24

## 2022-08-12 RX ADMIN — DESVENLAFAXINE SUCCINATE 50 MG: 50 TABLET, EXTENDED RELEASE ORAL at 10:19

## 2022-08-12 RX ADMIN — CETIRIZINE HYDROCHLORIDE 10 MG: 10 TABLET, FILM COATED ORAL at 10:19

## 2022-08-12 RX ADMIN — NICOTINE POLACRILEX 4 MG: 4 GUM, CHEWING BUCCAL at 17:39

## 2022-08-12 RX ADMIN — QUETIAPINE FUMARATE 100 MG: 100 TABLET ORAL at 21:09

## 2022-08-12 RX ADMIN — NICOTINE POLACRILEX 4 MG: 4 GUM, CHEWING BUCCAL at 21:13

## 2022-08-12 RX ADMIN — NICOTINE POLACRILEX 4 MG: 4 GUM, CHEWING BUCCAL at 18:46

## 2022-08-12 RX ADMIN — PRAZOSIN HYDROCHLORIDE 1 MG: 1 CAPSULE ORAL at 21:09

## 2022-08-12 RX ADMIN — ACETAMINOPHEN 325MG 650 MG: 325 TABLET ORAL at 00:42

## 2022-08-12 RX ADMIN — NICOTINE POLACRILEX 4 MG: 4 GUM, CHEWING BUCCAL at 14:14

## 2022-08-12 RX ADMIN — OLANZAPINE 10 MG: 10 TABLET, FILM COATED ORAL at 21:09

## 2022-08-12 RX ADMIN — LIDOCAINE PATCH 4% 1 PATCH: 40 PATCH TOPICAL at 10:19

## 2022-08-12 RX ADMIN — MULTIPLE VITAMINS W/ MINERALS TAB 1 TABLET: TAB at 10:19

## 2022-08-12 RX ADMIN — ACETAMINOPHEN 325MG 650 MG: 325 TABLET ORAL at 18:46

## 2022-08-12 RX ADMIN — POLYETHYLENE GLYCOL 3350 17 G: 17 POWDER, FOR SOLUTION ORAL at 10:19

## 2022-08-12 RX ADMIN — NAPROXEN 250 MG: 250 TABLET ORAL at 21:09

## 2022-08-12 RX ADMIN — NICOTINE POLACRILEX 4 MG: 4 GUM, CHEWING BUCCAL at 12:37

## 2022-08-12 RX ADMIN — MELATONIN TAB 3 MG 3 MG: 3 TAB at 21:09

## 2022-08-12 ASSESSMENT — ACTIVITIES OF DAILY LIVING (ADL)
LAUNDRY: WITH SUPERVISION
ADLS_ACUITY_SCORE: 29
DRESS: STREET CLOTHES;INDEPENDENT
ADLS_ACUITY_SCORE: 29
ORAL_HYGIENE: INDEPENDENT
ADLS_ACUITY_SCORE: 29
ADLS_ACUITY_SCORE: 29
LAUNDRY: WITH SUPERVISION
ADLS_ACUITY_SCORE: 29
DRESS: STREET CLOTHES;INDEPENDENT
ADLS_ACUITY_SCORE: 29
HYGIENE/GROOMING: HANDWASHING;SHOWER;INDEPENDENT
ADLS_ACUITY_SCORE: 29
ADLS_ACUITY_SCORE: 29
HYGIENE/GROOMING: HANDWASHING;SHOWER;INDEPENDENT
ADLS_ACUITY_SCORE: 29
ORAL_HYGIENE: INDEPENDENT

## 2022-08-12 NOTE — PLAN OF CARE
Pt c/o pain to Rt arm at the beginning of the shift. PRN Tylenol given as ordered; was helpful. Pt denied SI/SIB or any hallucination. No other concerns noted. Pt appeared to have slept for 6 hours. Will continue to monitor and offer support.     Problem: Sleep Disturbance  Goal: Adequate Sleep/Rest  Outcome: Ongoing, Progressing   Goal Outcome Evaluation:

## 2022-08-12 NOTE — PLAN OF CARE
Assessment/Intervention/Current Symtoms and Care Coordination  The patient's care was discussed with the treatment team and chart notes were reviewed  Patient met with team.  He remains stable on the unit- no behavioral issues.  Patient has been attending groups, compliant with meds..    [facility] spoke to Kiet Osorio  Milly Deutschafshin re: alternative placement to CARE. She was agreeable to look into this due to long wait list at CARE.   Patient has Medicare therefore we will attempt to have patient apply for [employer].  A CD assessment will be ordered for placement.  Patient signed a BRIGITTE for his PO to clarify patient's legal issues as patient states that there are no apprehend warrants out at this time.  Writer will contact today      Discharge Plan or Goal  CARE facility VS residential treatment     Barriers to Discharge   [facility] initially requiring placement to CARE facility due to h/o repeatedly elopement from residential placements but now willing to consider residential.  Will need to CD assessment completed     Referral Status  None today     Legal Status     Civil commitment with Das order

## 2022-08-12 NOTE — PROGRESS NOTES
"    ----------------------------------------------------------------------------------------------------------  [facility]  Psychiatric Progress Note  Hospital Day #27    See Mendenhall MRN# [id number]   Age: 31 year old YOB: 1990   Date of Admission: 7/14/2022     Subjective   The patient was discussed with the treatment team and chart notes were reviewed.      Identifier: See Mendenhall is a 31 year old man with a past psychiatric diagnosis of  Bipolar 2 disorder, substance use disorder, depressive disorder, anxiety disorder, PTSD and ADHD.     Sleep:  6 hours (08/12/22 0600)   Prescribed Medications: Taken as prescribed  PRN Psychiatric Medications: acetaminophen, albuterol, alum & mag hydroxide-simethicone, hydrocortisone, hydrOXYzine, lidocaine 4%, nicotine, OLANZapine, QUEtiapine    Overnight Nursing Notes/Staff Report:  \"Pt c/o pain to Rt arm at the beginning of the shift. PRN Tylenol given as ordered; was helpful. Pt denied SI/SIB or any hallucination. No other concerns noted. Pt appeared to have slept for 6 hours. Will continue to monitor and offer support. \"    Patient interview:  See Mendenhall was interviewed in the conference room. Mood is \"all right, want to be out of here\".  He states his depression is in proportion to the fact that he wants to leave, and does not feel there is any underlying source beyond this. Legal  has agreed to allow discharge alternative CD treatment facilities given long wait for CARE placement. See discussed possibility of changing insurance given he is on medicare and most CD treatment facilities won't accept that. He was preoccupied with barriers to medical assistance [place], especially getting bank statements, but attempted to reassure and provide possible solutions. Expressed he feels in a Catch-22, but agreed to try to exhaust all options while we are working on CD treatment approval process.     Discussed " "buprenorphine/naloxone. Stated he has overdosed on IV opioids multiple times previously and was planning to receive when in [facility]. Given this history, suboxone therapy is warranted and may confer protection from relapse or overdose after discharge. Explained that we will need to ensure continuity of care with [facility] clinic as outpatient.     Plan for IPCD consult to determine suboxone eligibility. Plan for patient care order to allow for a trip outside which will likely confer significant mood benefit.    No medication changes today.    -------------------------------------------------------------------------------------------------------------------------  Suicidal ideation: denies current or recent suicidal ideation or behaviors.  Homicidal ideation: denies current or recent homicidal ideation or behaviors.  Psychotic symptoms: Patient denies AH, VH, paranoia, delusions.     Medication side effects reported: No significant side effects.  Acute medical concerns: none     ROS   ROS was negative unless noted above.     Objective   Vitals:  Temp: 98.2  F (36.8  C) (Temp  Min: 97.7  F (36.5  C)  Max: 98.2  F (36.8  C))  Resp: 16 (Resp  Min: 16  Max: 16)  SpO2: 96 % (SpO2  Min: 96 %  Max: 98 %)  Pulse: 76 (Pulse  Min: 76  Max: 76)  BP: (!) 147/81  Systolic (24hrs), Av , Min:125 , Max:147     Diastolic (24hrs), Av, Min:77, Max:81      Mental Status Examination:  Oriented to:  Person/Self, Situation, Date and location  General: Awake and Alert  Appearance:  appears stated age, Posture is relaxed in chair, Grooming is adequate and Dressed in street clothing  Behavior:  calm, cooperative and engaged  Eye Contact:  adequate  Psychomotor:  no abnormal motor symptoms appreciated; no catatonia present  Speech:  appropriate volume/tone, talkative and with good articulation  Language: Fluent in English with appropriate syntax and vocabulary.  Mood:  \"feeling depressed\"  Affect:  congruent with mood, labile, " irritable and anxious  Thought Process:  coherent, linear and logical  Thought Content: No SI/HI/AH/VH; No apparent delusions  Associations:  intact  Insight:  fair due to recognizing need to go to CD, but highly externalizing regarding circumstances  Judgment:  fair due to willingness to engage with treatment and go to CD  Attention Span: adequate for conversation  Concentration:  grossly intact  Recent and Remote Memory:  not formally assessed  Fund of Knowledge: average     Allergies     Allergies   Allergen Reactions     Other Drug Allergy (See Comments)      Fake metal        Labs & Imaging     No results found for this or any previous visit (from the past 24 hour(s)).       Assessment   Diagnostic Impression:  See Mendenhall is a 31 year old gentleman with a past psychiatric diagnosis of  Bipolar 2 disorder, substance use disorder, depressive disorder and anxiety disorder, PTSD and ADHD. He was admitted from the ER on 07/16/2022 where he was brought by the police due to concern for psychosis and SI with plan. This in the context of methamphetamine and cocaine use, unclear regarding medication non-adherence, he has significant history of substance use and psychosocial stressors including housing instability, unemployment, legal issues, trauma and chronic mental health issues.     He was brought to the ED with SI, erratic and impulsive behaviors and psychosis (paranoid delusions and disorganized behavior/thought process) in the context of methamphetamine and cocaine use.  His last psychiatric hospitalization was in April/2022 at [facility] for psychosis in context of substance use.  He is currently followed by PCP Erinn Maradiaga at [facility]. Current psychosocial stressors include legal issues, trauma, chronic mental health issues, family dynamics and medical issues which he has been coping with by using substances, acting out to self and acting out to others.  Patient's support system  includes sister Samaria and friend Wilbert.      Substance use does appear to be playing a contributing role in the patient's presentation. Medical history does not appear to be significant, although chart review indicates history of chronic back pain, prescribed anabolic steroids that may be playing a role in presentation. In utero exposure and developmental delay need to be assessed.       Psychosocially history of trauma in childhood, long history of substance use, currently being unemployed and experiencing housing insecurity, legal issues, not being able to see his daughter represent both precipitating and perpetuating factors contributing to presentation.      The MSE is notable for some persisting paranoia, mood lability, being guarded, tangentiality and limited attention span/concentration, hyper-talkativeness difficult to interrupt speech. He denies self injurious behaviors. His reported symptoms of VH, paranoid delusions and grossly disorganized thought process in the context of methamphetamine and cocaine use are suggestive of substance-induced psychosis, although definitive diagnosis is still in evolution and further collateral information from family/ outpatient provider is needed at this time; differential includes primary psychotic disorder exacerbated by substance use, PTSD, Bipolar Disorder, schizoaffective disorder.  Additionally, he has traits of impulsive behaviors which interfere with his ability to adhere with the treatment plan.       Optimization of medications to target these symptom clusters in addition to evaluation of adequate community supports will be targets for treatment during this admission.      Pt has history of previous attempts and substance use representing heightened acute risk for self/others patient warrants inpatient psychiatric hospitalization to maintain his safety until door to door treatment can be arranged. Disposition pending clinical stabilization, medication  optimization and development of an appropriate discharge plan, including CD treatment referral upon discharge.     Principal Diagnoses:   Substance induced psychosis, now resolved  Amphetamine Use Disorder, severe  Cocaine Use Disorder, severe  Sedative Hypnotic Use Disorder, severe  Opiate Use Disorder, unspecified severity  Major depressive disorder, recurrent episode, moderate   Generalized anxiety disorder    Psychiatric & Medical course:  See Mendenhall was admitted to [facility] on a 72 hour hold, transferred to [facility] 12 following a conflict with another patient. Patient in question has been removed from Station 20, so now Mr. Mendenhall has returned to Station 20. His PTA prazosin was continued.      See Mendenhall continued to meet criteria for inpatient hospitalization medication optimization, inpatient stabilization, and appropriate discharge planning.      7/19: added Seroquel 50 mg BEDTIME; 25 mg TID PRN   7/20: Quetiapine (Seroquel) increased to 100 mg, BEDTIME scheduled. Keep 25 mg TID PRN  7/20: melatonin 3mg scheduled per patient request  7/25: Pending confirmation of CARE referral and status on list with outpatient CM. If anticipated to be a prolonged wait for placement, will consider less restrictive options for CD.  7/26: Ongoing issues with outpatient CM Agatha not returning messages or answering calls from team or patient. Message left with CM supervisor given persistent issues reaching CM.  7/27: OP CM strongly advocating for CARE placement given patient's history of several failed treatments and violent/aggressive behaviors in context of substance use. OP CM did confirm that she placed CARE referral. Pt continues to voice frustration about length of stay and delay in CARE referral as he was informed that the referral was placed over one week ago.   7/28: Returned to [facility] 20 from [facility]. CARE placement planned, likely will be available within two weeks. Mr. Mendenhall requests  "audience with  (Agatha) to explain circumstances of relapse and admission, even if it will not change the course of treatment or discharge to CARE facility, still wishes to clarify details. Changed scheduled SEROQUEL to ZYPREXA 10 mg at bedtime. SEROQUEL ordered PRN  7/29: CARE placement planned, but Mr. Mendenhall continues to request audience with  (Agatha). Should have been placed on list for CARE facility previously as he is on hospital day 13. Will ensure this is done today.  scheduled to visit unit and speak with Mr. Mendenhall on 8/1. Persistent anxiety, request for valium denied due to CD history and need to maintain CARE eligibility. Increased PRN ZYPREXA to accommodate maximum total dose of 40 mg/day (along with scheduled). Complains of shoulder pain s/p rotator cuff surgery and biceps tear in 12/2021, request for tramadol denied due to CD history. Ordered PRN naproxen and PRN lidocaine cream instead.  8/1: Seasonal allergies; ordered PRN Cetirizine 10 mg   8/2: patient requested testosterone supplementation; ordered serum testosterone and SHBG  8/3: Added desvenlafaxine 25 mg daily and added PRN melatonin 3 mg to existing scheduled melatonin 3 mg QPM; Testosterone labs pending.  8/8: Testosterone  & Sex Hormone Binding Globulin levels within normal range; results shared with patient.  8/10: increased desvenlafaxine to 50 mg daily, changed lidocaine gel to patch for biceps pain  8/12: IPCD consult for suboxone and Code 3 exception : \"Please consider exception to patient activities off of secure unit policy (Code 3). Patient would be escorted by security.\"     Plan   Today's Changes:     No change  _______________________________________________________________________  Psychiatric Management:    Prazosin 1mg at bedtime    Quetiapine 100mg at bedtime    Civil commitment with Das    Additional Planning:    Continue to monitor for and stabilize: irritable, psychosis and " substance use    Patient will be treated in therapeutic milieu with appropriate individual and group therapies as described.    Scheduled Medications (summary):  Current Facility-Administered Medications   Medication Dose Route Frequency     cetirizine  10 mg Oral Daily     desvenlafaxine  50 mg Oral Daily     lidocaine  1 patch Transdermal Q24H     lidocaine   Transdermal Q8H ALBA     melatonin  3 mg Oral At Bedtime     multivitamin w/minerals  1 tablet Oral Daily     OLANZapine  10 mg Oral At Bedtime     polyethylene glycol  17 g Oral Daily     prazosin  1 mg Oral At Bedtime     PRN Medications (summary):  Current Facility-Administered Medications   Medication Dose Route Frequency     acetaminophen  650 mg Oral Q4H PRN     albuterol  2 puff Inhalation Q6H PRN     alum & mag hydroxide-simethicone  30 mL Oral Q4H PRN     hydrocortisone   Topical BID PRN     hydrOXYzine  25 mg Oral Q4H PRN     melatonin  3 mg Oral At Bedtime PRN     naproxen  250 mg Oral TID PRN     nicotine  4 mg Buccal Q1H PRN     OLANZapine  5-10 mg Oral TID PRN    Or     OLANZapine  10 mg Intramuscular TID PRN     QUEtiapine  100 mg Oral At Bedtime PRN     QUEtiapine  25-50 mg Oral TID PRN     Legal Status:    Das    Committed     SIO:    No    Pt has not required locked seclusion or restraints in the past 24 hours to maintain safety, please refer to RN documentation for further details.    Safety Assessment:   Behavioral Orders   Procedures     Assault precautions     Code 1 - Restrict to Unit     Routine Programming     As clinically indicated     Status 15     Every 15 minutes.     Disposition:    Reason for ongoing admission:  Civil commitment pending placement for CD for direct transfer    Discharge location: D, pending clinical stabilization, medication optimization, and formulation of a safe discharge plan. Likely CARE, possibly within the next 14 days.      Discharge Medications: not ordered    Follow-up Appointments: not  scheduled  ____________________________________________________________________  Pertinent Medical diagnoses and management:    None  ____________________________________________________________________  Patient seen and discussed with my resident physician, Dr. Theresa Reese MD and attending physician Dr. Galina De Jesus MD who are in agreement with my assessment and plan.    Nick Coates     Attestation:    The patient was seen and discussed with the medical student. I concur with the student's note.  Theresa Reese MD  PGY1 Psychiatry Resident      Attestation:  This patient has been seen and evaluated by me, Galina De Jesus MD.  I have discussed this patient with the house staff team including the resident and medical student and I agree with the findings and plan in this note.    I have reviewed today's vital signs, medications, labs and imaging. Galina De Jesus MD , PhD.

## 2022-08-12 NOTE — PLAN OF CARE
08/11/22 2000   Group Therapy Session   Group Attendance attended group session   Time Session Began 1900   Time Session Ended 1945   Total Time patient participated (minutes) 45   Total # Attendees 4   Group Type psychotherapeutic   Group Topic Covered coping skills/lifestyle management   Group Session Detail Group participated in reviewing strategies to cope with stressful situations (reflecting the current stressors they are experiencing). Participants brainstormed ways to positively address each identified stressor. Group feedback was utilized in providing positive affirmation for each identified positive counter to a stressor. Group participants also reviewed their goals for the coming weekend in the context of planning and treatment adherence.Each participant identified specific helpful strategies that the participant will apply in staying treatment compliant.   Patient Response/Contribution listened actively;discussed personal experience with topic;organized   Patient Response Detail Pt participated, shared thoughtful answers, listened actively

## 2022-08-12 NOTE — CONSULTS
8/12/2022    CD consult acknowledged. Expect consult will be completed on Monday 08/15 or Tuesday 08/16. Consulted with ELY Hopson about timeframe and insurance concerns. Secondary insurance is being addressed.     [email or web address] [email or web address]  Direct phone: [phone]

## 2022-08-12 NOTE — PLAN OF CARE
Problem: Plan of Care - These are the overarching goals to be used throughout the patient stay.    Goal: Plan of Care Review/Shift Note  Description: The Plan of Care Review/Shift note should be completed every shift.  The Outcome Evaluation is a brief statement about your assessment that the patient is improving, declining, or no change.  This information will be displayed automatically on your shift note.  Outcome: Ongoing, Progressing  Flowsheets (Taken 8/11/2022 1700)  Plan of Care Reviewed With: patient     Problem: Suicidal Behavior  Goal: Suicidal Behavior is Absent or Managed  Outcome: Ongoing, Progressing  Intervention: Provide Immediate and Ongoing Protective Physical Environment  Recent Flowsheet Documentation  Taken 8/11/2022 1700 by Sheridan Cervantes RN  Safe Transition Promotion: protective factors promoted   Goal Outcome Evaluation:    Plan of Care Reviewed With: patient      Pt calm, visible in milieu majority of the shift, watching TV and minimally socializing or engages with peers. Endorsed anxiety 8/10, depression 8/10 and pain 8/10 respectively. PRN Seroquel and Tylenol given with minimal effects. Pt also requested nicotine gum hourly. Pt denies SI/HI, hallucinations and other psych symptoms. Pt continue to contract for safety. Pt had adequate intake of foods and fluids. Pt requested PRN Seroquel for sleep.

## 2022-08-12 NOTE — PLAN OF CARE
"Goal Outcome Evaluation:    Plan of Care Reviewed With: patient      Pt appeared irritable upon getting up this morning. Got up late, ate breakfast late and took the meds. Visible in milieu most of the shift. Not engaging or socializing as much. However, is calm, cooperative and med complaint. Attended groups. Endorsed pain, anxiety and depression as \"same\" declined to rate. Denies all other mental health symptoms. Contracts for safety. Good intake of foods and fluids. Appears clean and well kempt. At around 2pm, pt appeared agitated and requested to speak to SW, that he doesn't understand why he can't use the same CD assessment that he did less than six months ago! PRN Zyprexa was given and SW notified pt concerns.            "

## 2022-08-13 PROCEDURE — 250N000013 HC RX MED GY IP 250 OP 250 PS 637

## 2022-08-13 PROCEDURE — 250N000013 HC RX MED GY IP 250 OP 250 PS 637: Performed by: PSYCHIATRY & NEUROLOGY

## 2022-08-13 PROCEDURE — 124N000002 HC R&B MH UMMC

## 2022-08-13 RX ADMIN — MELATONIN TAB 3 MG 3 MG: 3 TAB at 21:21

## 2022-08-13 RX ADMIN — HYDROXYZINE HYDROCHLORIDE 25 MG: 25 TABLET ORAL at 22:30

## 2022-08-13 RX ADMIN — NICOTINE POLACRILEX 4 MG: 4 GUM, CHEWING BUCCAL at 22:30

## 2022-08-13 RX ADMIN — LIDOCAINE PATCH 4% 1 PATCH: 40 PATCH TOPICAL at 11:20

## 2022-08-13 RX ADMIN — ACETAMINOPHEN 325MG 650 MG: 325 TABLET ORAL at 16:48

## 2022-08-13 RX ADMIN — NAPROXEN 250 MG: 250 TABLET ORAL at 19:53

## 2022-08-13 RX ADMIN — PRAZOSIN HYDROCHLORIDE 1 MG: 1 CAPSULE ORAL at 21:22

## 2022-08-13 RX ADMIN — DESVENLAFAXINE SUCCINATE 50 MG: 50 TABLET, EXTENDED RELEASE ORAL at 09:51

## 2022-08-13 RX ADMIN — MULTIPLE VITAMINS W/ MINERALS TAB 1 TABLET: TAB at 09:51

## 2022-08-13 RX ADMIN — QUETIAPINE FUMARATE 100 MG: 100 TABLET ORAL at 21:21

## 2022-08-13 RX ADMIN — NICOTINE POLACRILEX 4 MG: 4 GUM, CHEWING BUCCAL at 18:38

## 2022-08-13 RX ADMIN — OLANZAPINE 10 MG: 10 TABLET, FILM COATED ORAL at 21:21

## 2022-08-13 RX ADMIN — POLYETHYLENE GLYCOL 3350 17 G: 17 POWDER, FOR SOLUTION ORAL at 09:51

## 2022-08-13 RX ADMIN — NICOTINE POLACRILEX 4 MG: 4 GUM, CHEWING BUCCAL at 09:53

## 2022-08-13 RX ADMIN — NICOTINE POLACRILEX 4 MG: 4 GUM, CHEWING BUCCAL at 16:46

## 2022-08-13 RX ADMIN — NICOTINE POLACRILEX 4 MG: 4 GUM, CHEWING BUCCAL at 11:13

## 2022-08-13 RX ADMIN — CETIRIZINE HYDROCHLORIDE 10 MG: 10 TABLET, FILM COATED ORAL at 09:52

## 2022-08-13 RX ADMIN — NICOTINE POLACRILEX 4 MG: 4 GUM, CHEWING BUCCAL at 19:53

## 2022-08-13 RX ADMIN — OLANZAPINE 5 MG: 5 TABLET, FILM COATED ORAL at 16:48

## 2022-08-13 RX ADMIN — NICOTINE POLACRILEX 4 MG: 4 GUM, CHEWING BUCCAL at 13:44

## 2022-08-13 RX ADMIN — NICOTINE POLACRILEX 4 MG: 4 GUM, CHEWING BUCCAL at 21:26

## 2022-08-13 ASSESSMENT — ACTIVITIES OF DAILY LIVING (ADL)
DRESS: STREET CLOTHES;INDEPENDENT
ORAL_HYGIENE: INDEPENDENT
ADLS_ACUITY_SCORE: 28
DRESS: STREET CLOTHES;INDEPENDENT
ADLS_ACUITY_SCORE: 29
ADLS_ACUITY_SCORE: 29
ADLS_ACUITY_SCORE: 28
ADLS_ACUITY_SCORE: 28
ADLS_ACUITY_SCORE: 29
ADLS_ACUITY_SCORE: 28
ADLS_ACUITY_SCORE: 28
LAUNDRY: WITH SUPERVISION
ADLS_ACUITY_SCORE: 29
ADLS_ACUITY_SCORE: 28
LAUNDRY: WITH SUPERVISION
ADLS_ACUITY_SCORE: 29
ORAL_HYGIENE: INDEPENDENT
ADLS_ACUITY_SCORE: 29
HYGIENE/GROOMING: HANDWASHING;SHOWER;INDEPENDENT
HYGIENE/GROOMING: HANDWASHING;SHOWER;INDEPENDENT

## 2022-08-13 NOTE — PLAN OF CARE
Problem: Plan of Care - These are the overarching goals to be used throughout the patient stay.    Goal: Plan of Care Review/Shift Note  Description: The Plan of Care Review/Shift note should be completed every shift.  The Outcome Evaluation is a brief statement about your assessment that the patient is improving, declining, or no change.  This information will be displayed automatically on your shift note.  Outcome: Ongoing, Progressing  Flowsheets (Taken 8/13/2022 1200)  Plan of Care Reviewed With: patient   Goal Outcome Evaluation:    Plan of Care Reviewed With: patient      Pt calm, cooperative and med complaint. Visible in the milieu most of the shift, for meals, TV and engaging and socializing with select peers. Rated chronic pain on the R arm at 8, depression and anxiety 8. Denies SI/HI and all the mental health symptoms. Pt took a shower today and did some of his laundary. No safety concerns at this time. Pt would like to discuss with the Provider about getting a covid booster no. 3?

## 2022-08-13 NOTE — PLAN OF CARE
Problem: Sleep Disturbance  Goal: Adequate Sleep/Rest  Outcome: Ongoing, Progressing    Pt appeared to have slept for 6.25 hours. Pt did not complain of pain, and no PRN medication was given, 15 minutes safety checks  was in place during shift. Staff will continue to offer support to pt.

## 2022-08-13 NOTE — PLAN OF CARE
"  Problem: Plan of Care - These are the overarching goals to be used throughout the patient stay.    Goal: Plan of Care Review/Shift Note  Description: The Plan of Care Review/Shift note should be completed every shift.  The Outcome Evaluation is a brief statement about your assessment that the patient is improving, declining, or no change.  This information will be displayed automatically on your shift note.  8/12/2022 2009 by Sheridan Cervantes RN  Outcome: Ongoing, Progressing  Flowsheets (Taken 8/12/2022 1700)  Plan of Care Reviewed With: patient  8/12/2022 1308 by Sheridan Cervantes RN  Outcome: Ongoing, Progressing  Flowsheets (Taken 8/12/2022 1100)  Plan of Care Reviewed With: patient     Problem: Suicidal Behavior  Goal: Suicidal Behavior is Absent or Managed  8/12/2022 2009 by Sheridan Cervantes RN  Outcome: Ongoing, Progressing  8/12/2022 1308 by Sheridan Cervantes RN  Outcome: Ongoing, Progressing  Intervention: Provide Immediate and Ongoing Protective Physical Environment  Recent Flowsheet Documentation  Taken 8/12/2022 1700 by Sheridan Cervantes RN  Safe Transition Promotion: protective factors promoted  Taken 8/12/2022 1100 by Sheridan Cervantes RN  Safe Transition Promotion: protective factors promoted   Goal Outcome Evaluation:    Plan of Care Reviewed With: patient      Pt calm this evening, visible in the unit most of the shift, more engaging and socializing. Endorsed chronic pain on R hand at 8/10. PRN Naprosyn was utilized. Pt declined to rate the severity of anxiety and depression, pt stated \"still there.\" Denies SI/HI, hallucinations and other psych symptoms. Contract for safety. Good intake of foods and fluids. Hygiene appears clean and well kempt. Pt has an order for Chemical Dependency IP Consult and order to please consider allowing pt out of the unit for a walk with security only for therapeutic purpose. Code changed to 3. PRN nicotine gum given hourly.           "

## 2022-08-13 NOTE — PROGRESS NOTES
08/12/22 2100   Group Therapy Session   Group Attendance attended group session   Time Session Began 2000   Time Session Ended 2050   Total Time patient participated (minutes) 45   Total # Attendees 4   Group Type recreation   Group Topic Covered leisure exploration/use of leisure time   Group Session Detail TR leisure group   Patient Response/Contribution cooperative with task   Patient Response Detail Pt participated in Therapeutic Recreation group with focus on leisure participation, communication skills, and critical thinking. Engaged and focused in the group recreational activity via a group game.  Pt was a full participant throughout the entire duration of group.  Appropriately shared sense of humor with peers during group and appeared to brighten with social interaction.

## 2022-08-14 PROCEDURE — 250N000013 HC RX MED GY IP 250 OP 250 PS 637: Performed by: PSYCHIATRY & NEUROLOGY

## 2022-08-14 PROCEDURE — 250N000013 HC RX MED GY IP 250 OP 250 PS 637

## 2022-08-14 PROCEDURE — 124N000002 HC R&B MH UMMC

## 2022-08-14 RX ADMIN — NICOTINE POLACRILEX 4 MG: 4 GUM, CHEWING BUCCAL at 19:05

## 2022-08-14 RX ADMIN — NICOTINE POLACRILEX 4 MG: 4 GUM, CHEWING BUCCAL at 20:52

## 2022-08-14 RX ADMIN — NICOTINE POLACRILEX 4 MG: 4 GUM, CHEWING BUCCAL at 11:18

## 2022-08-14 RX ADMIN — OLANZAPINE 10 MG: 10 TABLET, FILM COATED ORAL at 20:20

## 2022-08-14 RX ADMIN — POLYETHYLENE GLYCOL 3350 17 G: 17 POWDER, FOR SOLUTION ORAL at 10:04

## 2022-08-14 RX ADMIN — NICOTINE POLACRILEX 4 MG: 4 GUM, CHEWING BUCCAL at 17:43

## 2022-08-14 RX ADMIN — NAPROXEN 250 MG: 250 TABLET ORAL at 10:16

## 2022-08-14 RX ADMIN — MULTIPLE VITAMINS W/ MINERALS TAB 1 TABLET: TAB at 10:04

## 2022-08-14 RX ADMIN — ACETAMINOPHEN 325MG 650 MG: 325 TABLET ORAL at 16:02

## 2022-08-14 RX ADMIN — MELATONIN TAB 3 MG 3 MG: 3 TAB at 20:20

## 2022-08-14 RX ADMIN — ACETAMINOPHEN 325MG 650 MG: 325 TABLET ORAL at 11:18

## 2022-08-14 RX ADMIN — QUETIAPINE FUMARATE 100 MG: 100 TABLET ORAL at 20:25

## 2022-08-14 RX ADMIN — LIDOCAINE PATCH 4% 1 PATCH: 40 PATCH TOPICAL at 10:04

## 2022-08-14 RX ADMIN — OLANZAPINE 5 MG: 5 TABLET, FILM COATED ORAL at 14:03

## 2022-08-14 RX ADMIN — NICOTINE POLACRILEX 4 MG: 4 GUM, CHEWING BUCCAL at 15:48

## 2022-08-14 RX ADMIN — CETIRIZINE HYDROCHLORIDE 10 MG: 10 TABLET, FILM COATED ORAL at 10:03

## 2022-08-14 RX ADMIN — NICOTINE POLACRILEX 4 MG: 4 GUM, CHEWING BUCCAL at 14:03

## 2022-08-14 RX ADMIN — PRAZOSIN HYDROCHLORIDE 1 MG: 1 CAPSULE ORAL at 20:20

## 2022-08-14 RX ADMIN — DESVENLAFAXINE SUCCINATE 50 MG: 50 TABLET, EXTENDED RELEASE ORAL at 10:03

## 2022-08-14 ASSESSMENT — ACTIVITIES OF DAILY LIVING (ADL)
ADLS_ACUITY_SCORE: 28
HYGIENE/GROOMING: INDEPENDENT
DRESS: INDEPENDENT
ADLS_ACUITY_SCORE: 28
ADLS_ACUITY_SCORE: 28
ORAL_HYGIENE: INDEPENDENT
ADLS_ACUITY_SCORE: 28
ADLS_ACUITY_SCORE: 29
ADLS_ACUITY_SCORE: 29
DRESS: INDEPENDENT
ADLS_ACUITY_SCORE: 28
ADLS_ACUITY_SCORE: 28
LAUNDRY: WITH SUPERVISION
ADLS_ACUITY_SCORE: 29
ADLS_ACUITY_SCORE: 29
ORAL_HYGIENE: INDEPENDENT
ADLS_ACUITY_SCORE: 28
ADLS_ACUITY_SCORE: 28
LAUNDRY: WITH SUPERVISION
HYGIENE/GROOMING: INDEPENDENT

## 2022-08-14 NOTE — PLAN OF CARE
"  Problem: Plan of Care - These are the overarching goals to be used throughout the patient stay.    Goal: Plan of Care Review/Shift Note  Description: The Plan of Care Review/Shift note should be completed every shift.  The Outcome Evaluation is a brief statement about your assessment that the patient is improving, declining, or no change.  This information will be displayed automatically on your shift note.  8/13/2022 2108 by Sheridan Cervantes, RN  Outcome: Ongoing, Progressing  Flowsheets (Taken 8/13/2022 2000)  Plan of Care Reviewed With: patient  8/13/2022 1427 by Sheridan Cervantes, RN  Outcome: Ongoing, Progressing  Flowsheets (Taken 8/13/2022 1200)  Plan of Care Reviewed With: patient   Goal Outcome Evaluation:    Plan of Care Reviewed With: patient      Pt visible in the milieu majority of this shift. Sitting on the lounge watching TV but minimally engages with peers. Otherwise, he is calm, cooperative and med complaint. Pt endorsed \"some\" anxiety and pain 4/10 on right arm. PRN Naproxen, Tylenol, Hydroxyzine was given. Pt requested nicotine gum hourly. Denies other mental health symptoms. Contracts for safety.            "

## 2022-08-14 NOTE — PLAN OF CARE
"  Problem: Behavioral Health Plan of Care  Goal: Optimized Coping Skills in Response to Life Stressors  Outcome: Ongoing, Progressing  Intervention: Promote Effective Coping Strategies  Recent Flowsheet Documentation  Taken 8/14/2022 1612 by Josh Covarrubias RN  Supportive Measures: active listening utilized  Goal: Develops/Participates in Therapeutic [place] to Support Successful Transition  Outcome: Ongoing, Progressing  Intervention: Foster Therapeutic [place]  Recent Flowsheet Documentation  Taken 8/14/2022 1612 by Josh Covarrubias RN  Trust Relationship/Rapport:    reassurance provided    questions encouraged    questions answered    care explained    Patient is watching the viking game with peers in the milieu. His mood/affect is bright and pleasant. He is engaging, talkative, and respectful.  Patient is compliant with medications and is alert and oriented x4. He denies SI, HI, SIB,a and other psych related symptoms, but nevertheless, reports right arm bicep pain @ 5.  [place] game utilized as a distractions and tylenol 650 mg administered to mitigate pain. Patient reports no pain on reassess. Vitals are somewhat stable - Systolic BP is a little elevated(see numbers) Blood pressure (!) 143/82, pulse 73, temperature 97.8  F (36.6  C), temperature source Oral, resp. rate 16, height 1.803 m (5' 11\"), weight 97.7 kg (215 lb 4.8 oz), SpO2 97 %.      Appetite is good and patient ate 100% dinner and HS snacks and is compliant with medications and ADLs. Seroquel   mg administered @ hs for sleep. No psychosis observed. Will continue to monitor and provide cares.           "

## 2022-08-14 NOTE — PLAN OF CARE
"  Problem: Suicidal Behavior  Goal: Suicidal Behavior is Absent or Managed  Outcome: Met  Flowsheets (Taken 8/14/2022 1130)  Mutually Determined Action Steps (Suicidal Behavior Absent/Managed):    sets future-oriented goal    verbalizes safety check rationale   Goal Outcome Evaluation:    Plan of Care Reviewed With: patient     RN Assessment:  SI/Self harm:  pt denies  Aggression/agitation/HI:  none reported or observed  AVH:  none reported or observed  Sleep: adequate per patient  PRN Med: naproxen and tylenol for pain (minimal effect), olanzapine 5 mg for \"racing thoughts, anxiety\", nicotine gum  Medication AE: none reported or observed  Physical Complaints/Issues: R arm pain  I & O: eating and drinking well  LBM: yesterday  ADLs: independent  Visits: none this shift  Vitals:  WNL  COVID 19 Assessment:  negative  Milieu Participation: visible in the milieu watching TV, reading the newspaper and engaging with peers.   Behavior: Calm, pleasant and cooperative in interactions. Pt interacts appropriately with others and participates in his care. No symptoms of psychosis noted. Pt expresses feeling motivated to \"move on\" and \"do better in life\". Pt states he is motivated to avoid drugs as \"they made me crazy, I thought people could hear my thoughts, and I was hearing things\". No behavior concerns.     Pt requested that this writer alert his treatment team that he would like to consider re-starting suboxone, as pt may now discharge to a facility that would allow it. Pt also expressed concern that he be able to make it to a surgery appointment on 8/22 to repair arm injuries. Writer will pass these concerns along so they may be addressed by provider tomorrow.    No other concerns at this time. Nursing will continue to monitor and assess.     "

## 2022-08-14 NOTE — PLAN OF CARE
Problem: Sleep Disturbance  Goal: Adequate Sleep/Rest  Outcome: Ongoing, Progressing      Pt appeared to have slept for 7 hours. Pt did not complain of pain, and no PRN medication was given, 15 minutes safety checks  was in place during shift. Staff will continue to offer support to pt.

## 2022-08-15 PROCEDURE — 250N000013 HC RX MED GY IP 250 OP 250 PS 637

## 2022-08-15 PROCEDURE — 250N000013 HC RX MED GY IP 250 OP 250 PS 637: Performed by: HOSPITALIST

## 2022-08-15 PROCEDURE — 250N000013 HC RX MED GY IP 250 OP 250 PS 637: Performed by: PSYCHIATRY & NEUROLOGY

## 2022-08-15 PROCEDURE — 90853 GROUP PSYCHOTHERAPY: CPT

## 2022-08-15 PROCEDURE — 124N000002 HC R&B MH UMMC

## 2022-08-15 PROCEDURE — 99232 SBSQ HOSP IP/OBS MODERATE 35: CPT | Mod: GC | Performed by: PSYCHIATRY & NEUROLOGY

## 2022-08-15 RX ORDER — BUPRENORPHINE AND NALOXONE 2; .5 MG/1; MG/1
1 FILM, SOLUBLE BUCCAL; SUBLINGUAL 2 TIMES DAILY
Status: COMPLETED | OUTPATIENT
Start: 2022-08-15 | End: 2022-08-16

## 2022-08-15 RX ORDER — ACETAMINOPHEN 325 MG/1
975 TABLET ORAL EVERY 6 HOURS PRN
Status: DISCONTINUED | OUTPATIENT
Start: 2022-08-15 | End: 2022-08-29 | Stop reason: HOSPADM

## 2022-08-15 RX ORDER — NAPROXEN 250 MG/1
250 TABLET ORAL 3 TIMES DAILY PRN
Status: DISCONTINUED | OUTPATIENT
Start: 2022-08-15 | End: 2022-08-15

## 2022-08-15 RX ORDER — BUPRENORPHINE AND NALOXONE 4; 1 MG/1; MG/1
1 FILM, SOLUBLE BUCCAL; SUBLINGUAL 2 TIMES DAILY
Status: DISCONTINUED | OUTPATIENT
Start: 2022-08-16 | End: 2022-08-22

## 2022-08-15 RX ORDER — NAPROXEN 250 MG/1
250 TABLET ORAL 4 TIMES DAILY PRN
Status: DISCONTINUED | OUTPATIENT
Start: 2022-08-15 | End: 2022-08-29 | Stop reason: HOSPADM

## 2022-08-15 RX ADMIN — BUPRENORPHINE AND NALOXONE 1 FILM: 2; .5 FILM, SOLUBLE BUCCAL; SUBLINGUAL at 19:58

## 2022-08-15 RX ADMIN — ACETAMINOPHEN 975 MG: 325 TABLET, FILM COATED ORAL at 21:06

## 2022-08-15 RX ADMIN — NICOTINE POLACRILEX 4 MG: 4 GUM, CHEWING BUCCAL at 09:13

## 2022-08-15 RX ADMIN — PRAZOSIN HYDROCHLORIDE 1 MG: 1 CAPSULE ORAL at 21:07

## 2022-08-15 RX ADMIN — OLANZAPINE 10 MG: 10 TABLET, FILM COATED ORAL at 21:07

## 2022-08-15 RX ADMIN — NICOTINE POLACRILEX 4 MG: 4 GUM, CHEWING BUCCAL at 16:24

## 2022-08-15 RX ADMIN — QUETIAPINE FUMARATE 100 MG: 100 TABLET ORAL at 21:06

## 2022-08-15 RX ADMIN — MELATONIN TAB 3 MG 3 MG: 3 TAB at 21:07

## 2022-08-15 RX ADMIN — POLYETHYLENE GLYCOL 3350 17 G: 17 POWDER, FOR SOLUTION ORAL at 09:09

## 2022-08-15 RX ADMIN — DESVENLAFAXINE SUCCINATE 50 MG: 50 TABLET, EXTENDED RELEASE ORAL at 09:09

## 2022-08-15 RX ADMIN — NICOTINE POLACRILEX 4 MG: 4 GUM, CHEWING BUCCAL at 21:08

## 2022-08-15 RX ADMIN — CETIRIZINE HYDROCHLORIDE 10 MG: 10 TABLET, FILM COATED ORAL at 09:10

## 2022-08-15 RX ADMIN — OLANZAPINE 5 MG: 5 TABLET, FILM COATED ORAL at 14:34

## 2022-08-15 RX ADMIN — NICOTINE POLACRILEX 4 MG: 4 GUM, CHEWING BUCCAL at 14:34

## 2022-08-15 RX ADMIN — NICOTINE POLACRILEX 4 MG: 4 GUM, CHEWING BUCCAL at 18:49

## 2022-08-15 RX ADMIN — MULTIPLE VITAMINS W/ MINERALS TAB 1 TABLET: TAB at 09:10

## 2022-08-15 ASSESSMENT — ACTIVITIES OF DAILY LIVING (ADL)
DRESS: INDEPENDENT
HYGIENE/GROOMING: INDEPENDENT
ADLS_ACUITY_SCORE: 29
LAUNDRY: WITH SUPERVISION
ADLS_ACUITY_SCORE: 29
ORAL_HYGIENE: INDEPENDENT
ADLS_ACUITY_SCORE: 29
ORAL_HYGIENE: INDEPENDENT
LAUNDRY: WITH SUPERVISION
ADLS_ACUITY_SCORE: 29
DRESS: INDEPENDENT;STREET CLOTHES;SCRUBS (BEHAVIORAL HEALTH)
HYGIENE/GROOMING: INDEPENDENT

## 2022-08-15 NOTE — PLAN OF CARE
[facility] Occupational Therapy Group Intervention Note     08/15/22 1327   Group Therapy Session   Group Attendance attended group session   Time Session Began 1115   Time Session Ended 1200   Total Time patient participated (minutes) 45   Total # Attendees 6   Group Type task skill   Group Topic Covered coping skills/lifestyle management   Group Session Detail clinic - coping skill exploration, creative expression within personally meaningful activities, and observation of social, cognitive, and kinesthetic performance skills   Patient Response/Contribution cooperative with task;organized   Patient Response Detail Demonstrates congruent performance and social skills. No notable concerns.      Opal Jaeger OT on 8/15/2022 at 1:28 PM

## 2022-08-15 NOTE — PROGRESS NOTES
"    ----------------------------------------------------------------------------------------------------------  [facility]  Psychiatric Progress Note  Hospital Day #30    See Mendenhall MRN# [id number]   Age: 31 year old YOB: 1990   Date of Admission: 7/14/2022     Subjective   The patient was discussed with the treatment team and chart notes were reviewed.      Identifier: See Mendenhall is a 31 year old man with a past psychiatric diagnosis of  Bipolar 2 disorder, substance use disorder, depressive disorder, anxiety disorder, PTSD and ADHD.     Sleep:  6.25 hours (08/15/22 0600)   Prescribed Medications: Taken as prescribed  PRN Psychiatric Medications: acetaminophen, albuterol, alum & mag hydroxide-simethicone, hydrocortisone, hydrOXYzine, lidocaine 4%, nicotine, OLANZapine, QUEtiapine    Overnight Nursing Notes/Staff Report:  \"Pt appeared to have slept for 6.25 hours. Pt did not complain of pain, and no PRN medication was given, 15 minutes safety checks  was in place during shift. Staff will continue to offer support to pt. \"    Patient interview:  See Mendenhall was interviewed in the conference room. Mood is good. He states that he is getting on lists for CD treatment programs, but will need to get on medical assistance before most facilities will consider his application. [employer] offered to facilitate process of obtaining bank statements for medical assistance application. We discussed Mr. Mendenhall's desire to start suboxone and explained that Addiction Medicine Fellow will come by to evaluate and discuss. We discussed his outpatient appointment with [facility] (Dr. Oliveros) for surgical consultation on biceps tendon repair. Treatment team explained it is unlikely he will be discharged in advance of planned appointment date of 08/22/22 and recommended he reschedule.     Mr. Mendenhall requested increase in tylenol and naproxen for biceps " "pain associated with tendon rupture. Increased PRN to 975 mg q6hrs tylenol and TID naproxen 250 mg every 4 hrs PRN.     -------------------------------------------------------------------------------------------------------------------------  Suicidal ideation: denies current or recent suicidal ideation or behaviors.  Homicidal ideation: denies current or recent homicidal ideation or behaviors.  Psychotic symptoms: Patient denies AH, VH, paranoia, delusions.     Medication side effects reported: No significant side effects.  Acute medical concerns: none     ROS   ROS was negative unless noted above.     Objective   Vitals:  Temp: 97.8  F (36.6  C) (Temp  Min: 97.8  F (36.6  C)  Max: 97.9  F (36.6  C))  Resp: 16 (Resp  Min: 16  Max: 16)  SpO2: 97 % (SpO2  Min: 97 %  Max: 97 %)  Pulse: 73 (Pulse  Min: 73  Max: 81)  BP: 131/74  Systolic (24hrs), Av , Min:125 , Max:143     Diastolic (24hrs), Av, Min:72, Max:82      Mental Status Examination:  Oriented to:  Person/Self, Situation, Date and location  General: Awake and Alert  Appearance:  appears stated age, Posture is relaxed in chair, Grooming is adequate and Dressed in street clothing  Behavior:  calm, cooperative and engaged  Eye Contact:  adequate  Psychomotor:  no abnormal motor symptoms appreciated; no catatonia present  Speech:  appropriate volume/tone, talkative and with good articulation  Language: Fluent in English with appropriate syntax and vocabulary.  Mood:  \"okay\"  Affect:  congruent with mood, labile, irritable and anxious  Thought Process:  coherent, linear and logical  Thought Content: No SI/HI/AH/VH; No apparent delusions  Associations:  intact  Insight:  fair due to recognizing need to go to CD  Judgment:  fair due to willingness to engage with treatment and go to CD  Attention Span: adequate for conversation  Concentration:  grossly intact  Recent and Remote Memory:  not formally assessed  Fund of Knowledge: average     Allergies "     Allergies   Allergen Reactions     Other Drug Allergy (See Comments)      Fake metal        Labs & Imaging     No results found for this or any previous visit (from the past 24 hour(s)).       Assessment   Diagnostic Impression:  See Mendenhall is a 31 year old gentleman with a past psychiatric diagnosis of  Bipolar 2 disorder, substance use disorder, depressive disorder and anxiety disorder, PTSD and ADHD. He was admitted from the ER on 07/16/2022 where he was brought by the police due to concern for psychosis and SI with plan. This in the context of methamphetamine and cocaine use, unclear regarding medication non-adherence, he has significant history of substance use and psychosocial stressors including housing instability, unemployment, legal issues, trauma and chronic mental health issues.     He was brought to the ED with SI, erratic and impulsive behaviors and psychosis (paranoid delusions and disorganized behavior/thought process) in the context of methamphetamine and cocaine use.  His last psychiatric hospitalization was in April/2022 at [facility] for psychosis in context of substance use.  He is currently followed by PCP Erinn Maradiaga at [facility]. Current psychosocial stressors include legal issues, trauma, chronic mental health issues, family dynamics and medical issues which he has been coping with by using substances, acting out to self and acting out to others.  Patient's support system includes sister Samaria and friend Wilbert.      Substance use does appear to be playing a contributing role in the patient's presentation. Medical history does not appear to be significant, although chart review indicates history of chronic back pain, prescribed anabolic steroids that may be playing a role in presentation. In utero exposure and developmental delay need to be assessed.       Psychosocially history of trauma in childhood, long history of substance use, currently being unemployed  and experiencing housing insecurity, legal issues, not being able to see his daughter represent both precipitating and perpetuating factors contributing to presentation.      The MSE is notable for some persisting paranoia, mood lability, being guarded, tangentiality and limited attention span/concentration, hyper-talkativeness difficult to interrupt speech. He denies self injurious behaviors. His reported symptoms of VH, paranoid delusions and grossly disorganized thought process in the context of methamphetamine and cocaine use are suggestive of substance-induced psychosis, although definitive diagnosis is still in evolution and further collateral information from family/ outpatient provider is needed at this time; differential includes primary psychotic disorder exacerbated by substance use, PTSD, Bipolar Disorder, schizoaffective disorder.  Additionally, he has traits of impulsive behaviors which interfere with his ability to adhere with the treatment plan.       Optimization of medications to target these symptom clusters in addition to evaluation of adequate community supports will be targets for treatment during this admission.      Pt has history of previous attempts and substance use representing heightened acute risk for self/others patient warrants inpatient psychiatric hospitalization to maintain his safety until door to door treatment can be arranged. Disposition pending clinical stabilization, medication optimization and development of an appropriate discharge plan, including CD treatment referral upon discharge.     Principal Diagnoses:   Substance induced psychosis, now resolved  Amphetamine Use Disorder, severe  Cocaine Use Disorder, severe  Sedative Hypnotic Use Disorder, severe  Opiate Use Disorder, unspecified severity  Major depressive disorder, recurrent episode, moderate   Generalized anxiety disorder    Psychiatric & Medical course:  See Mendenhall was admitted to [facility] on a 72 hour  fei, transferred to [facility] 12 following a conflict with another patient. Patient in question has been removed from Station 20, so now Mr. Mendenhall has returned to Station 20. His PTA prazosin was continued.      See Mendenhall continued to meet criteria for inpatient hospitalization medication optimization, inpatient stabilization, and appropriate discharge planning.      7/19: added Seroquel 50 mg BEDTIME; 25 mg TID PRN   7/20: Quetiapine (Seroquel) increased to 100 mg, BEDTIME scheduled. Keep 25 mg TID PRN  7/20: melatonin 3mg scheduled per patient request  7/25: Pending confirmation of CARE referral and status on list with outpatient CM. If anticipated to be a prolonged wait for placement, will consider less restrictive options for CD.  7/26: Ongoing issues with outpatient CM Agatha not returning messages or answering calls from team or patient. Message left with CM supervisor given persistent issues reaching CM.  7/27: OP CM strongly advocating for CARE placement given patient's history of several failed treatments and violent/aggressive behaviors in context of substance use. OP CM did confirm that she placed CARE referral. Pt continues to voice frustration about length of stay and delay in CARE referral as he was informed that the referral was placed over one week ago.   7/28: Returned to [facility] 20 from [facility] 12. CARE placement planned, likely will be available within two weeks. Mr. Mendenhall requests audience with  (Agatha) to explain circumstances of relapse and admission, even if it will not change the course of treatment or discharge to CARE facility, still wishes to clarify details. Changed scheduled SEROQUEL to ZYPREXA 10 mg at bedtime. SEROQUEL ordered PRN  7/29: CARE placement planned, but Mr. Mendenhall continues to request audience with  (Agatha). Should have been placed on list for CARE facility previously as he is on hospital day 13. Will ensure this is done today. Case  "worker scheduled to visit unit and speak with Mr. Mendenhall on 8/1. Persistent anxiety, request for valium denied due to CD history and need to maintain CARE eligibility. Increased PRN ZYPREXA to accommodate maximum total dose of 40 mg/day (along with scheduled). Complains of shoulder pain s/p rotator cuff surgery and biceps tear in 12/2021, request for tramadol denied due to CD history. Ordered PRN naproxen and PRN lidocaine cream instead.  8/1: Seasonal allergies; ordered PRN Cetirizine 10 mg   8/2: patient requested testosterone supplementation; ordered serum testosterone and SHBG  8/3: Added desvenlafaxine 25 mg daily and added PRN melatonin 3 mg to existing scheduled melatonin 3 mg QPM; Testosterone labs pending.  8/8: Testosterone  & Sex Hormone Binding Globulin levels within normal range; results shared with patient.  8/10: increased desvenlafaxine to 50 mg daily, changed lidocaine gel to patch for biceps pain  8/12: IPCD consult for suboxone and Code 3 exception : \"Please consider exception to patient activities off of secure unit policy (Code 3). Patient would be escorted by security.\"  8/15: Increased PRN Tylenol and Naproxen     Plan   Today's Changes (PRN medications):     Changed Tylenol from 650 mg Q4H PRN to 975 mg Q6H.    Changed Naproxen from 250 mg TID to 250 mg QID.  _______________________________________________________________________  Psychiatric Management:    Prazosin 1mg at bedtime    Quetiapine 100mg at bedtime    Civil commitment with Das    Additional Planning:    Continue to monitor for and stabilize: irritable, psychosis and substance use    Patient will be treated in therapeutic milieu with appropriate individual and group therapies as described.    Scheduled Medications (summary):  Current Facility-Administered Medications   Medication Dose Route Frequency     cetirizine  10 mg Oral Daily     desvenlafaxine  50 mg Oral Daily     lidocaine  1 patch Transdermal Q24H     lidocaine   " Transdermal Q8H ALBA     melatonin  3 mg Oral At Bedtime     multivitamin w/minerals  1 tablet Oral Daily     OLANZapine  10 mg Oral At Bedtime     polyethylene glycol  17 g Oral Daily     prazosin  1 mg Oral At Bedtime     PRN Medications (summary):  Current Facility-Administered Medications   Medication Dose Route Frequency     acetaminophen  650 mg Oral Q4H PRN     albuterol  2 puff Inhalation Q6H PRN     alum & mag hydroxide-simethicone  30 mL Oral Q4H PRN     hydrocortisone   Topical BID PRN     hydrOXYzine  25 mg Oral Q4H PRN     melatonin  3 mg Oral At Bedtime PRN     naproxen  250 mg Oral TID PRN     nicotine  4 mg Buccal Q1H PRN     OLANZapine  5-10 mg Oral TID PRN    Or     OLANZapine  10 mg Intramuscular TID PRN     QUEtiapine  100 mg Oral At Bedtime PRN     QUEtiapine  25-50 mg Oral TID PRN     Legal Status:    Das    Committed     SIO:    No    Pt has not required locked seclusion or restraints in the past 24 hours to maintain safety, please refer to RN documentation for further details.    Safety Assessment:   Behavioral Orders   Procedures     Assault precautions     Code 1 - Restrict to Unit     Code 3     Routine Programming     As clinically indicated     Status 15     Every 15 minutes.     Disposition:    Reason for ongoing admission:  Civil commitment pending placement for CD for direct transfer    Discharge location: TBD, pending clinical stabilization, medication optimization, and formulation of a safe discharge plan. Likely CARE, possibly within the next 14 days.      Discharge Medications: not ordered    Follow-up Appointments: not scheduled  ____________________________________________________________________  Pertinent Medical diagnoses and management:    None  ____________________________________________________________________  Patient seen and discussed with my resident physician, Dr. Theresa Reese MD and attending physician Dr. Galina De Jesus MD who are in agreement with my  assessment and plan.    Nick Coates, MS3    Attestation:    The patient was seen and discussed with the medical student. I concur with the student's note.  Theresa Reese MD  PGY1 Psychiatry Resident    Attestation:  This patient has been seen and evaluated by me, Galina De Jesus MD.  I have discussed this patient with the house staff team including the resident and medical student and I agree with the findings and plan in this note.    I have reviewed today's vital signs, medications, labs and imaging. Galina De Jesus MD , PhD.

## 2022-08-15 NOTE — CONSULTS
Patient was seen for a ADAM assessment, he meets criteria for a residential program. We discussed [facility] and [facility]. [facility] likely does not have enough support for him at this time. Staff will talk to him about [place] programming.  Patient is trying to obtain MA, we will not make a referral until that is in place.    [facility]:  ([place], [facility], [facility], [facility], [facility], [facility] and [facility])  [address] [place] [id number]  Local: [phone]  Fax: [phone]    [facility]   [address] [place] [id number]  Cnxjl-[phone]  Fax- [phone]      Kaley Murray LPCC, LADC  Licensed Psychotherapist  [facility]  Direct: [phone]

## 2022-08-15 NOTE — PLAN OF CARE
"Pt has been calm, appropriate and pleasant in his behavior and interactions in the milieu and on the unit today. He has attended OT groups and has been social with select patients. He requested and received PRN Zyprexa (5mg) for \"restlessness and agitation\" around 2:30pm. Pt has Code 3 order so he was able to go outside to the [place] around 2:40pm with staff member and security. He also received a package of books/literature from his aunt- writer went through and approved the contents of the package, and they are safe and unit-appropriate. No safety or behavioral concerns at this time.    Problem: Behavioral Health Plan of Care  Goal: Plan of Care Review  Recent Flowsheet Documentation  Taken 8/15/2022 1149 by Marci Almanza  Plan of Care Reviewed With: patient  Patient Agreement with Plan of Care: agrees     "

## 2022-08-15 NOTE — CONSULTS
"  ----------------------------------------------------------------------------------------------------------  [facility], [place]   Addiction Medicine Consult Note     ID                                See Mendenhall is a 31 year old male with a past medical history of 2 disorder, depressive disorder, anxiety disorder, PTSD, ADHD, alcohol use disorder, methamphetamine use disorder, opiate use disorder who was referred by Dr. De Jesus for Suboxone.      Brief HPI                                    History is obtained from Dr. De Jesus, chart review and the patient. Patient is pleasant at first but becomes irritated and argumentative while discussing his history of substance use. He states \"I don't feel good going over these things\". However, he was later  agreeable to answer some questions and discuss the plan of care.     Patient states that he was using alcohol, methamphetamine, heroin and cocaine.     He states that he started using alcohol and cigarettes when he was 16-18 years and alcohol became a problem around the same age. He drinks about a liter of tequila daily.     At 18 years old, he started using a heroin and overdosed at 18 years. He notes that several friends  from heroin overdoses that year. He states that he was found outside [facility] and admitted at [facility]. Thereafter, he went to [facility]. He has relapsed several times since then and has used heroin \"on and off\" over the years. He admits to using Half gram to gram a day of heroin before this hospital stay.     About 2-3 years ago, he started using methamphetamine. He \"snorts or shoots\" this.     He has also used cocaine on and off for several years. He notes that he usually uses cocaine when he cannot get methamphetamine or heroin.     Nicotine: Started Nicotine at 18 years. Most recently has been using E-cigs and smoking some cigarettes on and off socially.     Patient is not " "sure what substance is his drug of choice \"All of them\". I have access to all of them and I crave them.     In April 2022, he was found [place] on [address] and stopping cars. He was admitted to the Psychiatry unit at [facility] from 04/08-04/25/2022.     In May, 2022, he was in prison after violating a probation. He notes that he was started on Suboxone while in [facility] and was on 20 mg once a day. He states that the Suboxone helped him a lot. Per chart review, he was released from [facility] on 07/08 with plan to admit to CD treatment on 07/13. Unfortunately, he relapsed on methamphetamine, heroin, cocaine and alcohol.     On 07/14, patient was found wondering in traffic and acting erratically. He was picked up by police and EMS and brought to the ED for psychiatric evaluation. Per chart review, he was anxious, paranoid and disorganized in the ED. He was a placed on a 72 hour hold. He was found to be on a commitment with a Das order through [facility].           RoS   CRAVINGS/URGES: yes, craves all the substances   SLEEP : sleep is okay     SUBSTANCE USE DETAILED HISTORY                                                                 Narrative: As above     Substance First became regular or problematic Most recent use   Alcohol  Every day use up to 1/2 bottle tequila on and off the last couple years.  July 14th   Cannabis     Stimulants  1/2-1 g  July 14th    Heroin  1/2 to 1 g  July 14th   Cocaine    July 14th    Hallucinogens       Inhalants       Other       OTC drugs       Nicotine        Longest period of sobriety; protective factors? Over a year    Withdrawal history Yes. Withdrawal makes him agitated    Previous [facility] treatment programs [facility]    Medical Complications, Overdose Hx No medical complications, 2 overdise    IV Drug Use Hx Yes   Previous Medication for Addiction Tx Suboxone was started in may          Consequences of Use:  Chart review shows that he has also had a history of " housing instability, legal issues, trauma and chronic mental health issues.      ALCOHOL Use Disorder Criteria: (Bold are positive) 11/11 criteria met (mild (2-3)/moderate (4-5)/severe (6+) level)  ?1. Often taken in larger amounts or over a longer period than was intended.  ?2. A persistent desire or unsuccessful efforts to cut down or control use.  ?3. A great deal of time is spent in activities necessary to obtain, use, or recover from the substance s effects.  ?4. Craving or a strong desire or urge to use the substance.  ?5. Recurrent use resulting in a failure to fulfill major role obligations at work, school, or home.  ?6. Continued use despite having persistent or recurrent social or interpersonal problems caused or exacerbated by its effects.  ?7. Important social, occupational, or recreational activities are given up or reduced because of use.  ?8. Recurrent use in situations in which it is physically hazardous.  ?9. Continued use despite knowledge of having a persistent or recurrent physical or psychological problem that is likely to have been caused or exacerbated by the substance.  ?10. Tolerance.  ?11. Withdrawal.        SOCIAL HISTORY                                                                         Financial situation: Not working currently   Education/Employment: He has not worked since 07/2021   Living Situation: He has been homeless for years   Support System: He states that there are family members supportive of his sobriety   Family: Youngest of 6 children. He has a 7 year old daughter who is his motivation to get sober     PERTINENT FAMILY HISTORY                                                                           Mental Health History-  Chart review indicates a Long history of mental illness reporting back to high school. Per [facility] note 04/08/22  Pt was hospitalized at [facility] in 2008 and when he was 15 years old. Pt has a history of SIB and suicide attempts during  adolescent years. Hx of overdose via heroin. Pt has a history of PTSD due to abuse while growing up.  Pt also has a history of ADHD, which he was diagnosed in 2018 through [facility]. Pt was re-evaluated in Feb. 2021 by Vincent Vang MA, LP through [facility]. Although he met criteria for ADHD combined type, he was not diagnosed.  See full assessment in records under media section, document scan date 9/8/21 pg 12-15.   Pt has a history of depression, anxiety, psychosis, and polysubstance abuse. He has a history of cocaine abuse, amphetamines, mariajuana, heroin, and alcohol ,  Substance Use History - Sister with hx of depression, brother with hx of ADHD, mom with hx of depression, mat. Grandfather completed suicide, dad with hx of physically aggression towards others. Mom and dad both use alcohol.       Pertinent MEDICAL  HISTORY                                   Cardiac (arrhythmia, valve disease, heart failure): none   Chronic Pulm Disease: none   GI (Liver disease, bypass history): none   CKD: none   Neuro (seizures, cognitive impairment, chronic pain): none     ID (HIV, endocarditis, hepatitis): none      MENTAL STATUS EXAM/WITHDRAWAL                                                              Alertness: alert   Orientation: awake and alert  Appearance: casually groomed  Behavior/Demeanor: labile emotion. Went from pleasant to irritated to pleasant , with good  eye contact.  Speech: normal  Psychomotor: normal or unremarkable    Mood:  irritable  Affect: full range and was congruent to speech content.  Thought Process/Associations: unremarkable   Thought Content: devoid of  suicidal ideation.   Perception: devoid of  auditory hallucinations and visual hallucinations  Insight: fair.  Judgment: fair.    These cognitive functions grossly appear as described, but were not formally tested.        ASSESSMENT/PLAN                                                  Summation/Assessment:    See Mendenhall  is a 12-year-old male with a past history methamphetamine use disorder, alcohol abuse opioid use disorder, cocaine use disorder, bipolar 2 disorder, depressed, anxiety disorder, PTSD who was admitted on 7/16/2020 ER after he was brought by police due to concern for psychosis and suicidal ideation with a plan.  Patient has a significant history of substance use and negative consequences including unemployment, housing stability, legal issues.  Patient is currently committed.    He admits to use of methamphetamine, heroin, cocaine and alcohol use. He is not sure of his drug of choice and thinks that he uses about the same amount of methamphetamine and heroin.      # Opioid use disorder severe   #Methamphetamine use disorder severe   #Cocaine use disorder, severe     Patient has tolerance, withdrawal, progressive use, loss of control, spending more time and more amount than intended. Patient has made attempts to quit, is experiencing cravings, and reports negative consequences.      Patient has been unable to stop using drugs in the community due to both physical and psychological symptoms.  Continued use will put the patient at risk for medical and/or psychiatric complications. His IV history of IV drug use puts him at high risk of overdose, infections, death.     -He states that he did well on Buprenorphine which was prescribed in nursing home in May/June 2022. Per patient, he was 20 mg.     -He has been hospitalized since July 14th and chart review does not show any opiate use.\     -Start with 2-0.5 mg Suboxone BID x 2 doses. Then increase to 4-1 mg BID. In 5 days can increase to 8-2 mg BID     -I am happy to see patient in the Addiction Medicine clinic whenever he is discharged.     #Alcohol use disorder-severe: Consider anti-craving medications     #Nicotine Use Disorder: Continue Nicotine gum q1h prn     #Major depressive disorder, recurrent episode  #Generalized anxiety disorder  -Management per psychiatry       Thank  you for involving us in the care of this patient. The Addiction Medicine service will sign off at this time.  Please do not hesitate to contact us with questions.      [place] PRESCRIPTION MONITORING PROGRAM [] was checked today:  indicates no controlled subtances reported in previous 8 months.    ADDICTION FELLOW: Deyanira Roldan MD, MPH     Patient was discussed with staff Dr. Quinn. Supervisor is Dr. Quinn

## 2022-08-15 NOTE — PLAN OF CARE
Problem: Plan of Care - These are the overarching goals to be used throughout the patient stay.    Goal: Plan of Care Review/Shift Note  8/15/2022 1801 by Haim Call RN  Outcome: Ongoing, Progressing  Plan of Care Reviewed With: patient   Goal Outcome Evaluation:    Plan of Care Reviewed With: patient      Pt is awaiting placement to either a CARE facility or residential tx. Pt spent the majority of the shift in the milieu watching TV, ate his dinner, socializing w/peers, and participated in group. Denied all psych sx's. Pt has been calm, cooperative, and pleasant. Pt was started on Suboxone this evening. Pt took PRN Tylenol at 2106, rating his R bicep pain at 8/10 and took PRN Seroquel 100 mg at 2106 as well for sleep.

## 2022-08-15 NOTE — PLAN OF CARE
Assessment/Intervention/Current Symtoms and Care Coordination  The patient's care was discussed with the treatment team and chart notes were reviewed  Patient met with team.  He remains stable on the unit- no behavioral issues.  Patient has been attending groups, compliant with meds..    Patient completed CD assessment this morning.   Patient has Medicare but needs MA for placement. Patient needs to provide bank statement to verify finances- CTC to assist to attempt to obtain these.    Patient signed a BRIGITTE for his PO to clarify patient's legal issues as patient states that there are no apprehend warrants out at this time.  CTC left PO a msg- awaiting call back.      Discharge Plan or Goal  CARE facility VS residential treatment     Barriers to Discharge   [facility] initially requiring placement to CARE facility due to h/o repeatedly elopement from residential placements but now willing to consider residential.    Referrals will be made once MA is activated.     Referral Status  None today     Legal Status     Civil commitment with Das order
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
## 2022-08-16 PROCEDURE — H2032 ACTIVITY THERAPY, PER 15 MIN: HCPCS

## 2022-08-16 PROCEDURE — 124N000002 HC R&B MH UMMC

## 2022-08-16 PROCEDURE — 90853 GROUP PSYCHOTHERAPY: CPT

## 2022-08-16 PROCEDURE — 250N000013 HC RX MED GY IP 250 OP 250 PS 637

## 2022-08-16 PROCEDURE — 99232 SBSQ HOSP IP/OBS MODERATE 35: CPT | Mod: GC | Performed by: PSYCHIATRY & NEUROLOGY

## 2022-08-16 PROCEDURE — 250N000013 HC RX MED GY IP 250 OP 250 PS 637: Performed by: PSYCHIATRY & NEUROLOGY

## 2022-08-16 PROCEDURE — 250N000013 HC RX MED GY IP 250 OP 250 PS 637: Performed by: HOSPITALIST

## 2022-08-16 RX ADMIN — NICOTINE POLACRILEX 4 MG: 4 GUM, CHEWING BUCCAL at 18:55

## 2022-08-16 RX ADMIN — ALBUTEROL SULFATE 2 PUFF: 90 AEROSOL, METERED RESPIRATORY (INHALATION) at 21:15

## 2022-08-16 RX ADMIN — BUPRENORPHINE AND NALOXONE 1 FILM: 2; .5 FILM, SOLUBLE BUCCAL; SUBLINGUAL at 10:42

## 2022-08-16 RX ADMIN — OLANZAPINE 10 MG: 10 TABLET, FILM COATED ORAL at 21:11

## 2022-08-16 RX ADMIN — LIDOCAINE PATCH 4% 1 PATCH: 40 PATCH TOPICAL at 10:43

## 2022-08-16 RX ADMIN — ACETAMINOPHEN 975 MG: 325 TABLET, FILM COATED ORAL at 16:19

## 2022-08-16 RX ADMIN — CETIRIZINE HYDROCHLORIDE 10 MG: 10 TABLET, FILM COATED ORAL at 10:43

## 2022-08-16 RX ADMIN — BUPRENORPHINE AND NALOXONE 1 FILM: 4; 1 FILM, SOLUBLE BUCCAL; SUBLINGUAL at 19:09

## 2022-08-16 RX ADMIN — NICOTINE POLACRILEX 4 MG: 4 GUM, CHEWING BUCCAL at 13:26

## 2022-08-16 RX ADMIN — PRAZOSIN HYDROCHLORIDE 1 MG: 1 CAPSULE ORAL at 21:11

## 2022-08-16 RX ADMIN — NICOTINE POLACRILEX 4 MG: 4 GUM, CHEWING BUCCAL at 14:12

## 2022-08-16 RX ADMIN — HYDROXYZINE HYDROCHLORIDE 25 MG: 25 TABLET ORAL at 21:11

## 2022-08-16 RX ADMIN — QUETIAPINE FUMARATE 100 MG: 100 TABLET ORAL at 21:12

## 2022-08-16 RX ADMIN — NICOTINE POLACRILEX 4 MG: 4 GUM, CHEWING BUCCAL at 12:40

## 2022-08-16 RX ADMIN — NICOTINE POLACRILEX 4 MG: 4 GUM, CHEWING BUCCAL at 17:59

## 2022-08-16 RX ADMIN — NAPROXEN 250 MG: 250 TABLET ORAL at 19:09

## 2022-08-16 RX ADMIN — NICOTINE POLACRILEX 4 MG: 4 GUM, CHEWING BUCCAL at 10:49

## 2022-08-16 RX ADMIN — NICOTINE POLACRILEX 4 MG: 4 GUM, CHEWING BUCCAL at 16:04

## 2022-08-16 RX ADMIN — MULTIPLE VITAMINS W/ MINERALS TAB 1 TABLET: TAB at 10:43

## 2022-08-16 RX ADMIN — POLYETHYLENE GLYCOL 3350 17 G: 17 POWDER, FOR SOLUTION ORAL at 10:42

## 2022-08-16 RX ADMIN — NICOTINE POLACRILEX 4 MG: 4 GUM, CHEWING BUCCAL at 21:12

## 2022-08-16 RX ADMIN — DESVENLAFAXINE SUCCINATE 50 MG: 50 TABLET, EXTENDED RELEASE ORAL at 10:42

## 2022-08-16 RX ADMIN — MELATONIN TAB 3 MG 3 MG: 3 TAB at 21:11

## 2022-08-16 ASSESSMENT — ACTIVITIES OF DAILY LIVING (ADL)
ADLS_ACUITY_SCORE: 29
LAUNDRY: WITH SUPERVISION
DRESS: INDEPENDENT
ADLS_ACUITY_SCORE: 29
ADLS_ACUITY_SCORE: 29
HYGIENE/GROOMING: INDEPENDENT
ADLS_ACUITY_SCORE: 29
ADLS_ACUITY_SCORE: 29
ORAL_HYGIENE: INDEPENDENT
ADLS_ACUITY_SCORE: 29

## 2022-08-16 NOTE — PROGRESS NOTES
----------------------------------------------------------------------------------------------------------  [facility]  Psychiatric Progress Note  Hospital Day #31    See Mendenhall MRN# [id number]   Age: 31 year old YOB: 1990   Date of Admission: 2022     Subjective   The patient was discussed with the treatment team and chart notes were reviewed.      Identifier: eSe Mendenhall is a 31 year old man with a past psychiatric diagnosis of  Bipolar 2 disorder, substance use disorder, depressive disorder, anxiety disorder, PTSD and ADHD.     Sleep:  7 hours (22 0600)   Prescribed Medications: Taken as prescribed  PRN Psychiatric Medications: acetaminophen, albuterol, alum & mag hydroxide-simethicone, hydrocortisone, hydrOXYzine, lidocaine 4%, nicotine, OLANZapine, QUEtiapine    Overnight Nursing Notes/Staff Report:      Patient interview:  See Mendenhall was interviewed in the conference room. He had some questions about rule 24 paperwork. He would be interested to do chemical dependency consult if it's helpful. He also shared the family dynamics and how that has affected him. Started suboxone 2 0.5g BID today, says this is helping.     -------------------------------------------------------------------------------------------------------------------------  Suicidal ideation: denies current or recent suicidal ideation or behaviors.  Homicidal ideation: denies current or recent homicidal ideation or behaviors.  Psychotic symptoms: Patient denies AH, VH, paranoia, delusions.     Medication side effects reported: No significant side effects.  Acute medical concerns: none     ROS   ROS was negative unless noted above.     Objective   Vitals:  Temp: 98  F (36.7  C) (Temp  Min: 98  F (36.7  C)  Max: 98  F (36.7  C))    (No data recorded)  SpO2: 97 % (SpO2  Min: 97 %  Max: 97 %)  Pulse: 78 (Pulse  Min: 78  Max: 80)  BP: 113/73  Systolic (24hrs), Av , Min:113  ", Max:125     Diastolic (24hrs), Av, Min:73, Max:78      Mental Status Examination:  Oriented to:  Person/Self, Situation, Date and location  General: Awake and Alert  Appearance:  appears stated age, Posture is relaxed in chair, Grooming is adequate and Dressed in street clothing  Behavior:  calm, cooperative and engaged  Eye Contact:  adequate  Psychomotor:  no abnormal motor symptoms appreciated; no catatonia present  Speech:  appropriate volume/tone, talkative and with good articulation  Language: Fluent in English with appropriate syntax and vocabulary.  Mood:  \"okay\"  Affect:  congruent with mood, labile, irritable and anxious  Thought Process:  linear and vague  Thought Content: No SI/HI/AH/VH; No apparent delusions  Associations:  intact  Insight:  fair due to recognizing need to go to CD  Judgment:  fair due to willingness to engage with treatment and go to CD  Attention Span: adequate for conversation  Concentration:  grossly intact  Recent and Remote Memory:  not formally assessed  Fund of Knowledge: average     Allergies     Allergies   Allergen Reactions     Other Drug Allergy (See Comments)      Fake metal        Labs & Imaging     No results found for this or any previous visit (from the past 24 hour(s)).       Assessment   Diagnostic Impression:  See Mendenhall is a 31 year old gentleman with a past psychiatric diagnosis of  Bipolar 2 disorder, substance use disorder, depressive disorder and anxiety disorder, PTSD and ADHD. He was admitted from the ER on 2022 where he was brought by the police due to concern for psychosis and SI with plan. This in the context of methamphetamine and cocaine use, unclear regarding medication non-adherence, he has significant history of substance use and psychosocial stressors including housing instability, unemployment, legal issues, trauma and chronic mental health issues.     He was brought to the ED with SI, erratic and impulsive behaviors and psychosis " (paranoid delusions and disorganized behavior/thought process) in the context of methamphetamine and cocaine use.  His last psychiatric hospitalization was in April/2022 at [facility] for psychosis in context of substance use.  He is currently followed by PCP Erinn Maradiaga at [facility]. Current psychosocial stressors include legal issues, trauma, chronic mental health issues, family dynamics and medical issues which he has been coping with by using substances, acting out to self and acting out to others.  Patient's support system includes sister Samaria and friend Wilbert.      Substance use does appear to be playing a contributing role in the patient's presentation. Medical history does not appear to be significant, although chart review indicates history of chronic back pain, prescribed anabolic steroids that may be playing a role in presentation. In utero exposure and developmental delay need to be assessed.       Psychosocially history of trauma in childhood, long history of substance use, currently being unemployed and experiencing housing insecurity, legal issues, not being able to see his daughter represent both precipitating and perpetuating factors contributing to presentation.      The MSE is notable for some persisting paranoia, mood lability, being guarded, tangentiality and limited attention span/concentration, hyper-talkativeness difficult to interrupt speech. He denies self injurious behaviors. His reported symptoms of VH, paranoid delusions and grossly disorganized thought process in the context of methamphetamine and cocaine use are suggestive of substance-induced psychosis, although definitive diagnosis is still in evolution and further collateral information from family/ outpatient provider is needed at this time; differential includes primary psychotic disorder exacerbated by substance use, PTSD, Bipolar Disorder, schizoaffective disorder.  Additionally, he has traits of  impulsive behaviors which interfere with his ability to adhere with the treatment plan.       Optimization of medications to target these symptom clusters in addition to evaluation of adequate community supports will be targets for treatment during this admission.      Pt has history of previous attempts and substance use representing heightened acute risk for self/others patient warrants inpatient psychiatric hospitalization to maintain his safety until door to door treatment can be arranged. Disposition pending clinical stabilization, medication optimization and development of an appropriate discharge plan, including CD treatment referral upon discharge.     Principal Diagnoses:   Substance induced psychosis, now resolved  Amphetamine Use Disorder, severe  Cocaine Use Disorder, severe  Sedative Hypnotic Use Disorder, severe  Opiate Use Disorder, unspecified severity  Major depressive disorder, recurrent episode, moderate   Generalized anxiety disorder    Psychiatric & Medical course:  See Mendenhall was admitted to [facility] on a 72 hour hold, transferred to [facility] following a conflict with another patient. Patient in question has been removed from [facility], so now Mr. Mendenhall has returned to [facility]. His PTA prazosin was continued.      See Mendenhall continued to meet criteria for inpatient hospitalization medication optimization, inpatient stabilization, and appropriate discharge planning.      7/19: added Seroquel 50 mg BEDTIME; 25 mg TID PRN   7/20: Quetiapine (Seroquel) increased to 100 mg, BEDTIME scheduled. Keep 25 mg TID PRN  7/20: melatonin 3mg scheduled per patient request  7/25: Pending confirmation of CARE referral and status on list with outpatient CM. If anticipated to be a prolonged wait for placement, will consider less restrictive options for CD.  7/26: Ongoing issues with outpatient CM Agatha not returning messages or answering calls from team or patient. Message left with CM  supervisor given persistent issues reaching CM.  7/27: OP CM strongly advocating for CARE placement given patient's history of several failed treatments and violent/aggressive behaviors in context of substance use. OP CM did confirm that she placed CARE referral. Pt continues to voice frustration about length of stay and delay in CARE referral as he was informed that the referral was placed over one week ago.   7/28: Returned to [facility] from [facility]. CARE placement planned, likely will be available within two weeks. Mr. Mendenhall requests audience with  (Agatha) to explain circumstances of relapse and admission, even if it will not change the course of treatment or discharge to CARE facility, still wishes to clarify details. Changed scheduled SEROQUEL to ZYPREXA 10 mg at bedtime. SEROQUEL ordered PRN  7/29: CARE placement planned, but Mr. Mendenhall continues to request audience with  (Agatha). Should have been placed on list for CARE facility previously as he is on hospital day 13. Will ensure this is done today.  scheduled to visit unit and speak with Mr. Mendenhall on 8/1. Persistent anxiety, request for valium denied due to CD history and need to maintain CARE eligibility. Increased PRN ZYPREXA to accommodate maximum total dose of 40 mg/day (along with scheduled). Complains of shoulder pain s/p rotator cuff surgery and biceps tear in 12/2021, request for tramadol denied due to CD history. Ordered PRN naproxen and PRN lidocaine cream instead.  8/1: Seasonal allergies; ordered PRN Cetirizine 10 mg   8/2: patient requested testosterone supplementation; ordered serum testosterone and SHBG  8/3: Added desvenlafaxine 25 mg daily and added PRN melatonin 3 mg to existing scheduled melatonin 3 mg QPM; Testosterone labs pending.  8/8: Testosterone  & Sex Hormone Binding Globulin levels within normal range; results shared with patient.  8/10: increased desvenlafaxine to 50 mg daily,  "changed lidocaine gel to patch for biceps pain  8/12: IPCD consult for suboxone and Code 3 exception : \"Please consider exception to patient activities off of secure unit policy (Code 3). Patient would be escorted by security.\"  8/15: Increased PRN Tylenol and Naproxen     Plan   Today's Changes (PRN medications):     Started suboxone 2-0.5 mg today for two doses, then increase per Dr. Roldan's note  _______________________________________________________________________  Psychiatric Management:    Prazosin 1mg at bedtime    Quetiapine 100mg at bedtime    Civil commitment with Das    Additional Planning:    Continue to monitor for and stabilize: irritable, psychosis and substance use    Patient will be treated in therapeutic milieu with appropriate individual and group therapies as described.    Scheduled Medications (summary):  Current Facility-Administered Medications   Medication Dose Route Frequency     buprenorphine HCl-naloxone HCl  1 Film Sublingual BID    Followed by     buprenorphine HCl-naloxone HCl  1 Film Sublingual BID     cetirizine  10 mg Oral Daily     desvenlafaxine  50 mg Oral Daily     lidocaine  1 patch Transdermal Q24H     lidocaine   Transdermal Q8H ALBA     melatonin  3 mg Oral At Bedtime     multivitamin w/minerals  1 tablet Oral Daily     OLANZapine  10 mg Oral At Bedtime     polyethylene glycol  17 g Oral Daily     prazosin  1 mg Oral At Bedtime     PRN Medications (summary):  Current Facility-Administered Medications   Medication Dose Route Frequency     acetaminophen  975 mg Oral Q6H PRN     albuterol  2 puff Inhalation Q6H PRN     alum & mag hydroxide-simethicone  30 mL Oral Q4H PRN     hydrocortisone   Topical BID PRN     hydrOXYzine  25 mg Oral Q4H PRN     melatonin  3 mg Oral At Bedtime PRN     naproxen  250 mg Oral 4x Daily PRN     nicotine  4 mg Buccal Q1H PRN     OLANZapine  5-10 mg Oral TID PRN    Or     OLANZapine  10 mg Intramuscular TID PRN     QUEtiapine  100 mg Oral At " Bedtime PRN     QUEtiapine  25-50 mg Oral TID PRN     Legal Status:    Das    Committed     SIO:    No    Pt has not required locked seclusion or restraints in the past 24 hours to maintain safety, please refer to RN documentation for further details.    Safety Assessment:   Behavioral Orders   Procedures     Assault precautions     Code 1 - Restrict to Unit     Code 3     Routine Programming     As clinically indicated     Status 15     Every 15 minutes.     Disposition:    Reason for ongoing admission:  Civil commitment pending placement for CD for direct transfer    Discharge location: TBD, pending clinical stabilization, medication optimization, and formulation of a safe discharge plan. Likely CARE, possibly within the next 14 days.      Discharge Medications: not ordered    Follow-up Appointments: not scheduled  ____________________________________________________________________  Pertinent Medical diagnoses and management:    None  ____________________________________________________________________  Patient seen and discussed with my resident physician, Dr. Theresa Reese MD and attending physician Dr. Galina De Jesus MD who are in agreement with my assessment and plan.    Nick Coates, MS3    Attestation:    The patient was seen and discussed with the medical student. I concur with the student's note.  Theresa Reese MD  PGY1 Psychiatry Resident      Attestation:  This patient has been seen and evaluated by me, Galina De Jesus MD.  I have discussed this patient with the house staff team including the resident and medical student and I agree with the findings and plan in this note.    I have reviewed today's vital signs, medications, labs and imaging. Galina De Jesus MD , PhD.

## 2022-08-16 NOTE — PROGRESS NOTES
08/16/22 0600   Group Therapy Session   Group Attendance attended group session   Time Session Began 1115   Time Session Ended 1200   Total Time patient participated (minutes) 45   Total # Attendees 5   Group Type expressive therapy   Group Topic Covered emotions/expression   Patient Response/Contribution cooperative with task   Pt participated in an non-directive open studio Art Therapy group. Pt was able to self initiate a project, choosing  To work with chaitanya. Pt is working on a very detailed chaitanya pizza and was enthusiastic about his project.  Pts mood was calm, pleasant participant.

## 2022-08-16 NOTE — PLAN OF CARE
"Pt slept in later than usual today, until about 10:30am. He appeared in good spirits upon waking, however, and was calm and polite in his interactions with writer and other staff. He had c/o right bicep pain. Scheduled lidocaine patch applied to manage pain, which he reported is \"barely\" effective. Pt has attended groups today and has been intermittently social with other patients on the unit. No safety concerns at this time.     Problem: Behavioral Health Plan of Care  Goal: Optimal Comfort and Wellbeing  Intervention: Monitor Pain and Promote Comfort  Recent Flowsheet Documentation  Taken 8/16/2022 1112 by Marci Almanza  Pain Management Interventions: medication (see MAR)     "

## 2022-08-16 NOTE — PLAN OF CARE
08/16/22 1502   Group Therapy Session   Group Attendance attended group session   Time Session Began 0200   Time Session Ended 0300   Total Time patient participated (minutes) 60   Total # Attendees 6   Group Type psychotherapeutic   Group Topic Covered coping skills/lifestyle management;emotions/expression;problem-solving   Group Session Detail During group, patient will be able to create a plan to use his/her/they strengths for a day, every day, or one week. Patients will explore how they can utilize their strengths in either new or familiar ways.   Patient Response/Contribution cooperative with task;discussed personal experience with topic;expressed understanding of topic   Patient Response Detail Patient was engaged and was able to verbalize what stood out to him from the topic. He shared thoughts on what coping skills  he will utilized when facing daily stressors.

## 2022-08-16 NOTE — PLAN OF CARE
Problem: Sleep Disturbance  Goal: Adequate Sleep/Rest  8/16/2022 0040 by Theresa Escobedo, RN  Outcome: Ongoing, Not Progressing     Pt appeared to have slept for 7 hours. Pt did not complain of pain, and no PRN medication was given, 15 minutes safety checks  was in place during shift. Staff will continue to offer support to pt.

## 2022-08-16 NOTE — PLAN OF CARE
08/16/22 0953   Individualization/Patient Specific Goals   Patient Personal Strengths community support;expressive of needs;resourceful;intellectual cognitive skills;positive vocational history;resilient   Patient Vulnerabilities adverse childhood experience(s);substance abuse/addiction;history of unsuccessful treatment;legal concerns;occupational insecurity;housing insecurity   Anxieties, Fears or Concerns Want to get out of hospital ASAP   Individualized Care Needs None   Patient-Specific Goals (Include Timeframe) 1. Evaluation/stabilization of mood/thought d/o symptoms 2. Safe with self/others 3. medication mgmt per MD's 4. CD treatment in place- locked facility 5. Legal issues clarified 6. Coordination of care with outpatient providers   Interprofessional Rounds   Participants CTC;nursing;patient;psychiatrist  (Med students, Pharm student)   Team Discussion   Participants Dr. De Jesus, Dr. Reese, Dr. Morfin, Dr. Miguel, Marci Almanza RN, Emiliana Colunga MA.LP, Med students, Pharm student   Progress Patient has been  stable.  He has been cooperative with meds. No behavioral issues.   Anticipated length of stay 14 days   Continued Stay Criteria/Rationale Patient is civilly committed with Das order. PLacement at CARE/Residential [place]/CD facility. Patient completed CD assessment- awaiting MA to be activated for placement options   Medical/Physical None   Plan Patient is on wait list for CARE,  CTC coordinating care with Hollie GLASER, [facility].  Due to long length of wait list, CTC working on possible admission to MI/CD residential treatment program   Rationale for change in precautions or plan No change in plan/precautions   Anticipated Discharge Disposition substance use treatment;another healthcare facility

## 2022-08-17 PROCEDURE — 93010 ELECTROCARDIOGRAM REPORT: CPT | Performed by: INTERNAL MEDICINE

## 2022-08-17 PROCEDURE — 250N000013 HC RX MED GY IP 250 OP 250 PS 637

## 2022-08-17 PROCEDURE — 250N000013 HC RX MED GY IP 250 OP 250 PS 637: Performed by: PSYCHIATRY & NEUROLOGY

## 2022-08-17 PROCEDURE — 250N000013 HC RX MED GY IP 250 OP 250 PS 637: Performed by: HOSPITALIST

## 2022-08-17 PROCEDURE — H2032 ACTIVITY THERAPY, PER 15 MIN: HCPCS

## 2022-08-17 PROCEDURE — 93005 ELECTROCARDIOGRAM TRACING: CPT

## 2022-08-17 PROCEDURE — 124N000002 HC R&B MH UMMC

## 2022-08-17 PROCEDURE — 99232 SBSQ HOSP IP/OBS MODERATE 35: CPT | Mod: GC | Performed by: PSYCHIATRY & NEUROLOGY

## 2022-08-17 RX ADMIN — MULTIPLE VITAMINS W/ MINERALS TAB 1 TABLET: TAB at 09:10

## 2022-08-17 RX ADMIN — ACETAMINOPHEN 975 MG: 325 TABLET, FILM COATED ORAL at 09:10

## 2022-08-17 RX ADMIN — POLYETHYLENE GLYCOL 3350 17 G: 17 POWDER, FOR SOLUTION ORAL at 09:10

## 2022-08-17 RX ADMIN — CETIRIZINE HYDROCHLORIDE 10 MG: 10 TABLET, FILM COATED ORAL at 09:10

## 2022-08-17 RX ADMIN — DESVENLAFAXINE SUCCINATE 50 MG: 50 TABLET, EXTENDED RELEASE ORAL at 09:10

## 2022-08-17 RX ADMIN — PRAZOSIN HYDROCHLORIDE 1 MG: 1 CAPSULE ORAL at 21:14

## 2022-08-17 RX ADMIN — QUETIAPINE FUMARATE 125 MG: 100 TABLET ORAL at 21:13

## 2022-08-17 RX ADMIN — NICOTINE POLACRILEX 4 MG: 4 GUM, CHEWING BUCCAL at 11:57

## 2022-08-17 RX ADMIN — NICOTINE POLACRILEX 4 MG: 4 GUM, CHEWING BUCCAL at 18:29

## 2022-08-17 RX ADMIN — BUPRENORPHINE AND NALOXONE 1 FILM: 4; 1 FILM, SOLUBLE BUCCAL; SUBLINGUAL at 19:03

## 2022-08-17 RX ADMIN — NICOTINE POLACRILEX 4 MG: 4 GUM, CHEWING BUCCAL at 09:10

## 2022-08-17 RX ADMIN — OLANZAPINE 10 MG: 10 TABLET, FILM COATED ORAL at 21:13

## 2022-08-17 RX ADMIN — OLANZAPINE 5 MG: 5 TABLET, FILM COATED ORAL at 11:56

## 2022-08-17 RX ADMIN — NICOTINE POLACRILEX 4 MG: 4 GUM, CHEWING BUCCAL at 11:15

## 2022-08-17 RX ADMIN — LIDOCAINE PATCH 4% 1 PATCH: 40 PATCH TOPICAL at 11:56

## 2022-08-17 RX ADMIN — BUPRENORPHINE AND NALOXONE 1 FILM: 4; 1 FILM, SOLUBLE BUCCAL; SUBLINGUAL at 09:10

## 2022-08-17 RX ADMIN — NAPROXEN 250 MG: 250 TABLET ORAL at 21:16

## 2022-08-17 RX ADMIN — MELATONIN TAB 3 MG 3 MG: 3 TAB at 21:13

## 2022-08-17 RX ADMIN — NICOTINE POLACRILEX 4 MG: 4 GUM, CHEWING BUCCAL at 17:02

## 2022-08-17 RX ADMIN — NICOTINE POLACRILEX 4 MG: 4 GUM, CHEWING BUCCAL at 21:13

## 2022-08-17 ASSESSMENT — ACTIVITIES OF DAILY LIVING (ADL)
LAUNDRY: WITH SUPERVISION
DRESS: INDEPENDENT
ADLS_ACUITY_SCORE: 29
ORAL_HYGIENE: INDEPENDENT
ADLS_ACUITY_SCORE: 29
ADLS_ACUITY_SCORE: 29
HYGIENE/GROOMING: INDEPENDENT
ADLS_ACUITY_SCORE: 29
LAUNDRY: WITH SUPERVISION
HYGIENE/GROOMING: INDEPENDENT
ADLS_ACUITY_SCORE: 29
DRESS: INDEPENDENT
ORAL_HYGIENE: INDEPENDENT
ADLS_ACUITY_SCORE: 29

## 2022-08-17 NOTE — PROGRESS NOTES
"    ----------------------------------------------------------------------------------------------------------  [facility]  Psychiatric Progress Note  Hospital Day #32    See Mendenhall MRN# [id number]   Age: 31 year old YOB: 1990   Date of Admission: 7/14/2022     Subjective   The patient was discussed with the treatment team and chart notes were reviewed.      Identifier: See Mendenhall is a 31 year old man with a past psychiatric diagnosis of  Bipolar 2 disorder, substance use disorder, depressive disorder, anxiety disorder, PTSD and ADHD.     Sleep:  7 hours (08/17/22 0600)   Prescribed Medications: Taken as prescribed  PRN Psychiatric Medications: acetaminophen, albuterol, alum & mag hydroxide-simethicone, hydrocortisone, hydrOXYzine, lidocaine 4%, nicotine, OLANZapine, QUEtiapine    Overnight Nursing Notes/Staff Report:  \"Pt appeared to have slept for 7hours. Pt did not complain of pain, and no PRN medication was given, 15 minutes safety checks  was in place during shift. Staff will continue to offer support to pt.\"    Patient interview:  See Mendenhall was interviewed in the conference room. He endorsed positive mood. Explained that he got everyone in the unit to sign a petition to get a recreation/gym room. Expressed understanding that he will have to cancel his orthopedic appointment on 8/22 if he is not discharged and will not be able to leave the unit for appointment. Requested higher dose of suboxone and we explained that addiction medicine is increasing dose. Requested placement in [facility] because it would be farther from [place] where he states \"I know where to find drugs.\" Recommended he speak to social work/. Requested increase in Seroquel for better anxiety management.     -------------------------------------------------------------------------------------------------------------------------  Suicidal ideation: " "denies current or recent suicidal ideation or behaviors.  Homicidal ideation: denies current or recent homicidal ideation or behaviors.  Psychotic symptoms: Patient denies AH, VH, paranoia, delusions.     Medication side effects reported: No significant side effects.  Acute medical concerns: none     ROS   ROS was negative unless noted above.     Objective   Vitals:  Temp: 98.5  F (36.9  C) (Temp  Min: 98.1  F (36.7  C)  Max: 98.5  F (36.9  C))    (No data recorded)  SpO2: 96 % (SpO2  Min: 96 %  Max: 96 %)  Pulse: 85 (Pulse  Min: 80  Max: 85)  BP: 111/71  Systolic (24hrs), Av , Min:111 , Max:141     Diastolic (24hrs), Av, Min:71, Max:84      Mental Status Examination:  Oriented to:  Person/Self, Situation, Date and location  General: Awake and Alert  Appearance:  appears stated age, Posture is relaxed in chair, Grooming is adequate and Dressed in street clothing  Behavior:  calm, cooperative and engaged  Eye Contact:  adequate  Psychomotor:  no abnormal motor symptoms appreciated; no catatonia present  Speech:  appropriate volume/tone, talkative and with good articulation  Language: Fluent in English with appropriate syntax and vocabulary.  Mood:  \"okay\"  Affect:  congruent with mood, labile, irritable and anxious  Thought Process:  linear and vague  Thought Content: No SI/HI/AH/VH; No apparent delusions  Associations:  intact  Insight:  fair due to recognizing need to go to CD  Judgment:  fair due to willingness to engage with treatment and go to CD  Attention Span: adequate for conversation  Concentration:  grossly intact  Recent and Remote Memory:  not formally assessed  Fund of Knowledge: average     Allergies     Allergies   Allergen Reactions     Other Drug Allergy (See Comments)      Fake metal        Labs & Imaging     No results found for this or any previous visit (from the past 24 hour(s)).       Assessment   Diagnostic Impression:  See Mendenhall is a 31 year old gentleman with a past " psychiatric diagnosis of  Bipolar 2 disorder, substance use disorder, depressive disorder and anxiety disorder, PTSD and ADHD. He was admitted from the ER on 07/16/2022 where he was brought by the police due to concern for psychosis and SI with plan. This in the context of methamphetamine and cocaine use, unclear regarding medication non-adherence, he has significant history of substance use and psychosocial stressors including housing instability, unemployment, legal issues, trauma and chronic mental health issues.     He was brought to the ED with SI, erratic and impulsive behaviors and psychosis (paranoid delusions and disorganized behavior/thought process) in the context of methamphetamine and cocaine use.  His last psychiatric hospitalization was in April/2022 at [facility] for psychosis in context of substance use.  He is currently followed by PCP Erinn Maradiaga [place]. Current psychosocial stressors include legal issues, trauma, chronic mental health issues, family dynamics and medical issues which he has been coping with by using substances, acting out to self and acting out to others.  Patient's support system includes sister Samaria and friend Wilbert.      Substance use does appear to be playing a contributing role in the patient's presentation. Medical history does not appear to be significant, although chart review indicates history of chronic back pain, prescribed anabolic steroids that may be playing a role in presentation. In utero exposure and developmental delay need to be assessed.       Psychosocially history of trauma in childhood, long history of substance use, currently being unemployed and experiencing housing insecurity, legal issues, not being able to see his daughter represent both precipitating and perpetuating factors contributing to presentation.      The MSE is notable for some persisting paranoia, mood lability, being guarded, tangentiality and limited attention  span/concentration, hyper-talkativeness difficult to interrupt speech. He denies self injurious behaviors. His reported symptoms of VH, paranoid delusions and grossly disorganized thought process in the context of methamphetamine and cocaine use are suggestive of substance-induced psychosis, although definitive diagnosis is still in evolution and further collateral information from family/ outpatient provider is needed at this time; differential includes primary psychotic disorder exacerbated by substance use, PTSD, Bipolar Disorder, schizoaffective disorder.  Additionally, he has traits of impulsive behaviors which interfere with his ability to adhere with the treatment plan.       Optimization of medications to target these symptom clusters in addition to evaluation of adequate community supports will be targets for treatment during this admission.      Pt has history of previous attempts and substance use representing heightened acute risk for self/others patient warrants inpatient psychiatric hospitalization to maintain his safety until door to door treatment can be arranged. Disposition pending clinical stabilization, medication optimization and development of an appropriate discharge plan, including CD treatment referral upon discharge.     Principal Diagnoses:   Substance induced psychosis, now resolved  Amphetamine Use Disorder, severe  Cocaine Use Disorder, severe  Sedative Hypnotic Use Disorder, severe  Opiate Use Disorder, unspecified severity  Major depressive disorder, recurrent episode, moderate   Generalized anxiety disorder    Psychiatric & Medical course:  See Mendenhall was admitted to [facility] on a 72 hour hold, transferred to Station 12 following a conflict with another patient. Patient in question has been removed from Station 20, so now Mr. Mendenhall has returned to Station 20. His PTA prazosin was continued.      See Mendenhall continued to meet criteria for inpatient hospitalization  medication optimization, inpatient stabilization, and appropriate discharge planning.      7/19: added Seroquel 50 mg BEDTIME; 25 mg TID PRN   7/20: Quetiapine (Seroquel) increased to 100 mg, BEDTIME scheduled. Keep 25 mg TID PRN  7/20: melatonin 3mg scheduled per patient request  7/25: Pending confirmation of CARE referral and status on list with outpatient CM. If anticipated to be a prolonged wait for placement, will consider less restrictive options for CD.  7/26: Ongoing issues with outpatient CM Agatha not returning messages or answering calls from team or patient. Message left with CM supervisor given persistent issues reaching CM.  7/27: OP CM strongly advocating for CARE placement given patient's history of several failed treatments and violent/aggressive behaviors in context of substance use. OP CM did confirm that she placed CARE referral. Pt continues to voice frustration about length of stay and delay in CARE referral as he was informed that the referral was placed over one week ago.   7/28: Returned to [facility] from [facility]. CARE placement planned, likely will be available within two weeks. Mr. Mendenhall requests audience with  (Agatha) to explain circumstances of relapse and admission, even if it will not change the course of treatment or discharge to CARE facility, still wishes to clarify details. Changed scheduled SEROQUEL to ZYPREXA 10 mg at bedtime. SEROQUEL ordered PRN  7/29: CARE placement planned, but Mr. Mendenhall continues to request audience with  (Agatha). Should have been placed on list for CARE facility previously as he is on hospital day 13. Will ensure this is done today.  scheduled to visit unit and speak with Mr. Mendenhall on 8/1. Persistent anxiety, request for valium denied due to CD history and need to maintain CARE eligibility. Increased PRN ZYPREXA to accommodate maximum total dose of 40 mg/day (along with scheduled). Complains of shoulder pain  "s/p rotator cuff surgery and biceps tear in 12/2021, request for tramadol denied due to CD history. Ordered PRN naproxen and PRN lidocaine cream instead.  8/1: Seasonal allergies; ordered PRN Cetirizine 10 mg   8/2: patient requested testosterone supplementation; ordered serum testosterone and SHBG  8/3: Added desvenlafaxine 25 mg daily and added PRN melatonin 3 mg to existing scheduled melatonin 3 mg QPM; Testosterone labs pending.  8/8: Testosterone  & Sex Hormone Binding Globulin levels within normal range; results shared with patient.  8/10: increased desvenlafaxine to 50 mg daily, changed lidocaine gel to patch for biceps pain  8/12: IPCD consult for suboxone and Code 3 exception : \"Please consider exception to patient activities off of secure unit policy (Code 3). Patient would be escorted by security.\"  8/15: Increased PRN Tylenol and Naproxen  8/17: Started suboxone 2-0.5 mg today for two doses; increase according to Dr. Roldan's consult note     Plan   Today's Changes (PRN medications):     Increase PM Seroquel PRN to 125 mg each evening  _______________________________________________________________________  Psychiatric Management:    Prazosin 1mg at bedtime    Quetiapine 100mg at bedtime    Civil commitment with Das    Additional Planning:    Continue to monitor for and stabilize: irritable, psychosis and substance use    Patient will be treated in therapeutic milieu with appropriate individual and group therapies as described.    Scheduled Medications (summary):  Current Facility-Administered Medications   Medication Dose Route Frequency     buprenorphine HCl-naloxone HCl  1 Film Sublingual BID     cetirizine  10 mg Oral Daily     desvenlafaxine  50 mg Oral Daily     lidocaine  1 patch Transdermal Q24H     lidocaine   Transdermal Q8H ALBA     melatonin  3 mg Oral At Bedtime     multivitamin w/minerals  1 tablet Oral Daily     OLANZapine  10 mg Oral At Bedtime     polyethylene glycol  17 g Oral Daily "     prazosin  1 mg Oral At Bedtime     PRN Medications (summary):  Current Facility-Administered Medications   Medication Dose Route Frequency     acetaminophen  975 mg Oral Q6H PRN     albuterol  2 puff Inhalation Q6H PRN     alum & mag hydroxide-simethicone  30 mL Oral Q4H PRN     hydrocortisone   Topical BID PRN     hydrOXYzine  25 mg Oral Q4H PRN     melatonin  3 mg Oral At Bedtime PRN     naproxen  250 mg Oral 4x Daily PRN     nicotine  4 mg Buccal Q1H PRN     OLANZapine  5-10 mg Oral TID PRN    Or     OLANZapine  10 mg Intramuscular TID PRN     QUEtiapine  100 mg Oral At Bedtime PRN     QUEtiapine  25-50 mg Oral TID PRN     Legal Status:    Das    Committed     SIO:    No    Pt has not required locked seclusion or restraints in the past 24 hours to maintain safety, please refer to RN documentation for further details.    Safety Assessment:   Behavioral Orders   Procedures     Assault precautions     Code 1 - Restrict to Unit     Code 3     Routine Programming     As clinically indicated     Status 15     Every 15 minutes.     Disposition:    Reason for ongoing admission:  Civil commitment pending placement for CD for direct transfer    Discharge location: TBD, pending clinical stabilization, medication optimization, and formulation of a safe discharge plan. Likely CARE, possibly within the next 14 days.      Discharge Medications: not ordered    Follow-up Appointments: not scheduled  ____________________________________________________________________  Pertinent Medical diagnoses and management:    None  ____________________________________________________________________  Patient seen and discussed with my resident physician, Dr. Theresa Reese MD and attending physician Dr. Galina De Jesus MD who are in agreement with my assessment and plan.    Nick Coates, MS3    Attestation:    The patient was seen and discussed with the medical student. I concur with the student's note.  Theresa Reese,  MD  PGY1 Psychiatry Resident    Attestation:  This patient has been seen and evaluated by me, Galina De Jesus MD.  I have discussed this patient with the house staff team including the resident and medical student and I agree with the findings and plan in this note.    I have reviewed today's vital signs, medications, labs and imaging. Galina De Jesus MD , PhD.

## 2022-08-17 NOTE — DISCHARGE INSTRUCTIONS
Behavioral Discharge Planning and Instructions    Summary:   You were admitted to [facility] on 7/16/22 with acute maría, and relapse on polysubstances  under the care of Dr. De Jesus.  You met with Dr. De Jesus and his team daily for ongoing psychiatric assessment and medication management.    Due to concerns for your safety, your provisional discharge was revoked for longer term stabilization.  You had opportunities to participate in therapeutic groups on the unit.   At this time your mood is stabilizing and you report you are not having thoughts or intent to harm yourself or others.   You will be provisionally discharged to [facility] treatment.    Disposition:  [facility]    Main Diagnosis:   Bipolar Affective Disorder  Polysubstance Use Disorder    Health Care Follow-up:   Appointments:  Your meds will be managed by Physician at [facility]  Your care team at [facility] and [facility]  will ensure that you are set up with outpatient Psychiatry/therapy.      Major Treatments, Procedures and Findings:   Medications were  managed throughout your stay. An internal medicine consult was completed during your stay. You had the opportunity to participate in treatment programming while on the unit including occupational therapy, mental health support and education and spiritual services.     Symptoms to Report:   Please report if you are experiencing increased aggression and/or confusion, problematic loss of sleep, worsening mood, or thoughts of suicide to your treatment team or notify your primary provider.   IF THE SYMPTOMS YOU ARE EXPERIENCING ARE A MEDICAL EMERGENCY, CALL 911 IMMEDIATELY    Lifestyle Adjustment:   1. Adjust your lifestyle to get enough sleep, relaxation, exercise and good nutrition.  Continue to develop healthy coping skills to decrease stress and promote a healthy and sober lifestyle.  2. Abstain from all substances of abuse.  3. Take medications as prescribed.  Please  "work with your doctor to discuss any concerns you have with your medications or side effects you may be experiencing.  4. Follow up with appointments as scheduled.      Resources:   [facility] Crisis:  Suicide and Crisis Response  The [facility] Crisis Response Unit (CRU) provides 24-hour phone and face-to-face crisis intervention and consultation. Call [phone].   The primary goal of the Crisis Response Unit is to assist in stabilizing the immediate crisis, ensure safety for the client, the family and/or the community, and assist with referrals to appropriate county or other agency staff as necessary.  Other crisis resources:  [place] Crisis Text Line: Text  Home  to 423849 to communicate with a trained crisis counselor  *[facility] Crisis: COPE: ([phone]) 24 hour mobile crisis support for people having a mental health crisis in [facility].   *Acute Psychiatric Services ([phone]). 24-hour walk-in crisis psychiatric support at [facility]; Emergency Medications Clinic available 7:30am - 2:00pm  *[facility]: Text  \"life\"  to 79316   A trained crisis counselor will respond immediately  *Crisis Connection: (687.207.5337)  24-hour confidential telephone counseling   *[facility] Emergency Room: [phone]  *[place] Recovery Connection (MRC) : [facility] connects people seeking recovery to resources that help foster and sustain long-term recovery. Whether you are seeking resources for treatment, transportation, housing, job training, education, health or other pathways to recovery, [facility] is a great place to start.    [phone]      [email or web address]     General Medication Instructions:   See your medication sheet(s) for instructions.   Take all medicines as directed.  Make no changes unless your doctor suggests them.   Go to all your doctor visits.  Be sure to have all your required lab tests. This way, your medicines can be refilled on " time.  Do not use any drugs not prescribed by your doctor.    Advance Directives:   Scanned document on file with [employer]? No scanned doc  Is document scanned? Pt states no documents  Honoring Choices Your Rights Handout: Informed and given  Was more information offered? Materials given    The Treatment team has appreciated the opportunity to work with you. If you have any questions or concerns about your recent admission, you can contact the unit which can receive your call 24 hours a day, 7 days a week. They will be able to get in touch with a Provider if needed. The unit number is [phone]

## 2022-08-17 NOTE — PROGRESS NOTES
Pt was an engaged participant in both verbal and nonverbal interventions in dance/movement psychotherapy.  Group cohesion supported socialization and affirmation.  This allowed for a rotation of leadership to practice listening to physical needs and reaching symbiotic connections,  Pt identified and discussed personal strengths and identity.  He was a  in both types of interventions and expressed appreciation for the therapeutic process.         08/17/22 1015   Expressive Therapy   Therapy Type dance/movement   Minutes of Treatment 50

## 2022-08-17 NOTE — PLAN OF CARE
"Pt denies SI.  Pt reported racing thoughts.  Pt requested and rec'd PRN Zyprexa.  Pt asked for and was ordered increase in HS Seroquel.  Pt has been on the phone, eating meals, social with peers.  No bx issues.    Problem: Plan of Care - These are the overarching goals to be used throughout the patient stay.    Goal: Plan of Care Review/Shift Note  Description: The Plan of Care Review/Shift note should be completed every shift.  The Outcome Evaluation is a brief statement about your assessment that the patient is improving, declining, or no change.  This information will be displayed automatically on your shift note.  Outcome: Ongoing, Not Progressing  Goal: Patient-Specific Goal (Individualized)  Description: You can add care plan individualizations to a care plan. Examples of Individualization might be:  \"Parent requests to be called daily at 9am for status\", \"I have a hard time hearing out of my right ear\", or \"Do not touch me to wake me up as it startles me\".  Outcome: Ongoing, Not Progressing  Goal: Absence of Hospital-Acquired Illness or Injury  Outcome: Ongoing, Not Progressing  Goal: Optimal Comfort and Wellbeing  Outcome: Ongoing, Not Progressing  Goal: Readiness for Transition of Care  Outcome: Ongoing, Not Progressing     Problem: Behavioral Health Plan of Care  Goal: Optimal Comfort and Wellbeing  Outcome: Ongoing, Not Progressing  Goal: Plan of Care Review  Outcome: Ongoing, Not Progressing  Goal: Patient-Specific Goal (Individualization)  Outcome: Ongoing, Not Progressing  Goal: Adheres to Safety Considerations for Self and Others  Outcome: Ongoing, Not Progressing  Goal: Absence of New-Onset Illness or Injury  Outcome: Ongoing, Not Progressing  Goal: Optimized Coping Skills in Response to Life Stressors  Outcome: Ongoing, Not Progressing  Goal: Develops/Participates in Therapeutic [place] to Support Successful Transition  Outcome: Ongoing, Not Progressing  Goal: Team Discussion  Outcome: Ongoing, Not " Progressing   Goal Outcome Evaluation:

## 2022-08-17 NOTE — PLAN OF CARE
08/16/22 2058   Group Therapy Session   Group Attendance attended group session   Time Session Began 2000   Time Session Ended 2100   Total Time patient participated (minutes) 60   Total # Attendees 8   Group Type psychotherapeutic   Group Topic Covered coping skills/lifestyle management   Group Session Detail The group participated in random questions which resulted in discussion concerning what their legacy will be, what motivates them, what they are proud of, what they ve learned from their biggest mistake, what they d like to do differently and how they define success.   Patient Response/Contribution cooperative with task;discussed personal experience with topic;expressed understanding of topic   Patient Response Detail Pt was engaged in the group, shared personal experience, demonstrated a sense of humor. Mild grandiosity present at times.

## 2022-08-17 NOTE — PLAN OF CARE
Assessment/Intervention/Current Symtoms and Care Coordination  The patient's care was discussed with the treatment team and chart notes were reviewed  Patient met with team.    Patient has been attending groups, compliant with meds, increasing suboxone    Patient completed CD assessment.  Patient has Medicare but needs MA for placement.    Obstacle with getting bank statements to assist in obtaining MA.    Awaiting word back from Milly DEVRIES regarding Rule 24 eligibility forms.  Once MA is activated, CD referrals will be sent out.       Discharge Plan or Goal  CARE facility VS residential treatment  Due to long wait time at CARE - supervisor is now agreeable to a possible residential outpatient program vs CARE.      Barriers to Discharge   County initially requiring placement to CARE facility due to h/o repeatedly elopement from residential placements but now willing to consider residential.    Referrals will be made once MA is activated.     Referral Status  None today     Legal Status     Civil commitment with Das order

## 2022-08-17 NOTE — PLAN OF CARE
Problem: Behavioral Health Plan of Care  Goal: Optimal Comfort and Wellbeing  Outcome: Ongoing, Progressing  Intervention: Provide Person-Centered Care  Recent Flowsheet Documentation  Taken 8/17/2022 1600 by Donny Gonzales RN  Trust Relationship/Rapport:    care explained    choices provided    emotional support provided    empathic listening provided    questions answered    questions encouraged    reassurance provided    thoughts/feelings acknowledged  Goal: Plan of Care Review  Recent Flowsheet Documentation  Taken 8/17/2022 1600 by Donny Gonzales RN  Plan of Care Reviewed With: patient  Patient Agreement with Plan of Care: agrees  Goal: Adheres to Safety Considerations for Self and Others  Intervention: Develop and Maintain Individualized Safety Plan  Recent Flowsheet Documentation  Taken 8/17/2022 1600 by Donny Gonzales RN  Safety Measures: safety rounds completed  Goal: Absence of New-Onset Illness or Injury  Intervention: Identify and Manage Fall Risk  Recent Flowsheet Documentation  Taken 8/17/2022 1600 by Donny Gonzales RN  Safety Measures: safety rounds completed  Goal: Develops/Participates in Therapeutic Fountain to Support Successful Transition  Intervention: Foster Therapeutic Fountain  Recent Flowsheet Documentation  Taken 8/17/2022 1600 by Donny Gonzales RN  Trust Relationship/Rapport:    care explained    choices provided    emotional support provided    empathic listening provided    questions answered    questions encouraged    reassurance provided    thoughts/feelings acknowledged      Patient isolative and withdrawn to his room at the beginning of the shift, he later came out around 1630 and hangout with peers in the lounge area. Patient was engaged with peers and staff, observed watching television and also did some coloring in the dinning room. Affect remained bright with calm cooperative mood. Patient remained on Civil commitment with Das order, was medication compliant.  "At the beginning of the shift patient complain of right shoulder pain 8/10 but refuse prn pain medication\"I can tolerate the pain right now\" but later requested prn naproxen for pain with some relief noted. Patient has new order for EKG to compare to baseline EKG taken last month, he's on medications that may cause prolong QT interval. RT did EKG test around 1618 and test result place in the chart.  Patient requested prn nicotine gums few times due to increase craving. Denies SI/SIB/HI and hallucination. On schedule suboxone, medication compliant and contract for safety. No assaultive behavior noted.      /84 (BP Location: Right arm, Patient Position: Sitting)   Pulse 68   Temp 97.3  F (36.3  C) (Temporal)   Resp 18   Ht 1.803 m (5' 11\")   Wt 97.7 kg (215 lb 4.8 oz)   SpO2 98%   BMI 30.03 kg/m                         "

## 2022-08-17 NOTE — PLAN OF CARE
"  Problem: Plan of Care - These are the overarching goals to be used throughout the patient stay.    Goal: Plan of Care Review/Shift Note  Outcome: Ongoing, Progressing   Goal Outcome Evaluation:    Pt spent the majority of the shift in the milieu socializing w/peers, making phone calls, ate dinner, and participated in group. Pt stated that he plans to discharge to \"[facility] residential rather than \"[facility]\". Otherwise pt has had a pleasant shift. Denied all psych sx's and took PRN Tylenol at 1619 for R bicep and back pain rating at a 8/10, PRN Naproxen at 1909, PRN Seroquel at 2112, and PRN hydroxyzine at 2111 along with PRN albuterol for SOB.                               "

## 2022-08-17 NOTE — PLAN OF CARE
Problem: Sleep Disturbance  Goal: Adequate Sleep/Rest  Outcome: Ongoing, Progressing    Pt appeared to have slept for 7hours. Pt did not complain of pain, and no PRN medication was given, 15 minutes safety checks  was in place during shift. Staff will continue to offer support to pt.

## 2022-08-18 LAB
ATRIAL RATE - MUSE: 83 BPM
DIASTOLIC BLOOD PRESSURE - MUSE: NORMAL MMHG
INTERPRETATION ECG - MUSE: NORMAL
P AXIS - MUSE: 57 DEGREES
PR INTERVAL - MUSE: 154 MS
QRS DURATION - MUSE: 82 MS
QT - MUSE: 374 MS
QTC - MUSE: 439 MS
R AXIS - MUSE: -2 DEGREES
SYSTOLIC BLOOD PRESSURE - MUSE: NORMAL MMHG
T AXIS - MUSE: 20 DEGREES
VENTRICULAR RATE- MUSE: 83 BPM

## 2022-08-18 PROCEDURE — 250N000013 HC RX MED GY IP 250 OP 250 PS 637: Performed by: PSYCHIATRY & NEUROLOGY

## 2022-08-18 PROCEDURE — 250N000013 HC RX MED GY IP 250 OP 250 PS 637

## 2022-08-18 PROCEDURE — G0177 OPPS/PHP; TRAIN & EDUC SERV: HCPCS

## 2022-08-18 PROCEDURE — 250N000013 HC RX MED GY IP 250 OP 250 PS 637: Performed by: HOSPITALIST

## 2022-08-18 PROCEDURE — 90853 GROUP PSYCHOTHERAPY: CPT

## 2022-08-18 PROCEDURE — 99232 SBSQ HOSP IP/OBS MODERATE 35: CPT | Mod: GC | Performed by: PSYCHIATRY & NEUROLOGY

## 2022-08-18 PROCEDURE — 124N000002 HC R&B MH UMMC

## 2022-08-18 RX ADMIN — BUPRENORPHINE AND NALOXONE 1 FILM: 4; 1 FILM, SOLUBLE BUCCAL; SUBLINGUAL at 09:57

## 2022-08-18 RX ADMIN — NAPROXEN 250 MG: 250 TABLET ORAL at 18:43

## 2022-08-18 RX ADMIN — NICOTINE POLACRILEX 4 MG: 4 GUM, CHEWING BUCCAL at 22:23

## 2022-08-18 RX ADMIN — BUPRENORPHINE AND NALOXONE 1 FILM: 4; 1 FILM, SOLUBLE BUCCAL; SUBLINGUAL at 19:00

## 2022-08-18 RX ADMIN — OLANZAPINE 5 MG: 5 TABLET, FILM COATED ORAL at 11:09

## 2022-08-18 RX ADMIN — NICOTINE POLACRILEX 4 MG: 4 GUM, CHEWING BUCCAL at 21:16

## 2022-08-18 RX ADMIN — NICOTINE POLACRILEX 4 MG: 4 GUM, CHEWING BUCCAL at 11:11

## 2022-08-18 RX ADMIN — NICOTINE POLACRILEX 4 MG: 4 GUM, CHEWING BUCCAL at 18:43

## 2022-08-18 RX ADMIN — NICOTINE POLACRILEX 4 MG: 4 GUM, CHEWING BUCCAL at 12:07

## 2022-08-18 RX ADMIN — LIDOCAINE PATCH 4% 1 PATCH: 40 PATCH TOPICAL at 09:56

## 2022-08-18 RX ADMIN — ACETAMINOPHEN 975 MG: 325 TABLET, FILM COATED ORAL at 11:09

## 2022-08-18 RX ADMIN — QUETIAPINE FUMARATE 125 MG: 100 TABLET ORAL at 22:22

## 2022-08-18 RX ADMIN — DESVENLAFAXINE SUCCINATE 50 MG: 50 TABLET, EXTENDED RELEASE ORAL at 09:57

## 2022-08-18 RX ADMIN — POLYETHYLENE GLYCOL 3350 17 G: 17 POWDER, FOR SOLUTION ORAL at 09:57

## 2022-08-18 RX ADMIN — QUETIAPINE FUMARATE 50 MG: 25 TABLET ORAL at 18:46

## 2022-08-18 RX ADMIN — MELATONIN TAB 3 MG 3 MG: 3 TAB at 21:02

## 2022-08-18 RX ADMIN — CETIRIZINE HYDROCHLORIDE 10 MG: 10 TABLET, FILM COATED ORAL at 09:57

## 2022-08-18 RX ADMIN — ACETAMINOPHEN 975 MG: 325 TABLET, FILM COATED ORAL at 21:13

## 2022-08-18 RX ADMIN — ALBUTEROL SULFATE 2 PUFF: 90 AEROSOL, METERED RESPIRATORY (INHALATION) at 21:18

## 2022-08-18 RX ADMIN — OLANZAPINE 10 MG: 10 TABLET, FILM COATED ORAL at 21:01

## 2022-08-18 RX ADMIN — MULTIPLE VITAMINS W/ MINERALS TAB 1 TABLET: TAB at 09:57

## 2022-08-18 RX ADMIN — PRAZOSIN HYDROCHLORIDE 1 MG: 1 CAPSULE ORAL at 21:07

## 2022-08-18 ASSESSMENT — ACTIVITIES OF DAILY LIVING (ADL)
HYGIENE/GROOMING: INDEPENDENT
LAUNDRY: WITH SUPERVISION
LAUNDRY: UNABLE TO COMPLETE
ADLS_ACUITY_SCORE: 29
DRESS: INDEPENDENT
ADLS_ACUITY_SCORE: 29
HYGIENE/GROOMING: INDEPENDENT
ADLS_ACUITY_SCORE: 29
ORAL_HYGIENE: INDEPENDENT
ADLS_ACUITY_SCORE: 29
ORAL_HYGIENE: INDEPENDENT
DRESS: SCRUBS (BEHAVIORAL HEALTH);INDEPENDENT
ADLS_ACUITY_SCORE: 29

## 2022-08-18 NOTE — PROGRESS NOTES
Behavioral Health  Note   Behavioral Health  Spirituality Group Note     Unit 20    Name: See Mendenhall    YOB: 1990   MRN: [id number]    Age: 31 year old     Patient attended -led group, which included discussion of spirituality, coping with illness and building resilience.   Patient attended group for 1.0 hrs.   patient demonstrated an appreciation of topic's application for their personal circumstances.     Raegan [facility]  Staff    Page [phone]

## 2022-08-18 NOTE — PROGRESS NOTES
08/17/22 2100   Group Therapy Session   Time Session Began 2010   Time Session Ended 2100   Total Time patient participated (minutes) 43   Total # Attendees 4   Group Type expressive therapy   Group Topic Covered relaxation techniques   Group Session Detail Evening Relaxation   Patient Response/Contribution cooperative with task   Patient Response Detail Cooperatively engaged in Evening Music Relaxation group to decrease anxiety and promote sleep.  Calm affect, appropriately engaged in session, responding well to the music.  Showed a sense of humor in group. Continues to present as more on the loose/disinhibited side, but pleasant.
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
## 2022-08-18 NOTE — PLAN OF CARE
Pt is visible in the milieu. His goal is to make phone calls and  attend all groups for this shift. Pleasant and cooperative, medication compliant. Pt took shower. He was medication compliant. Pt talks about future oriented goals  and was making resolve to do better. Pt denies SI/SIB/Hallucination. Rates his anxiety at 3/10 and depression at 6/10. Requested Zyprexa for anxiety and Tylenol for pain on his R arm that proved effective.         Problem: Behavioral Health Plan of Care  Goal: Patient-Specific Goal (Individualization)  Outcome: Ongoing, Progressing  Flowsheets (Taken 8/18/2022 1217)  Patient Personal Strengths:    expressive of emotions    insight into illness/situation     Problem: Behavioral Health Plan of Care  Goal: Adheres to Safety Considerations for Self and Others  Outcome: Ongoing, Progressing  Intervention: Develop and Maintain Individualized Safety Plan  Recent Flowsheet Documentation  Taken 8/18/2022 1130 by Fallon Dee, RN  Safety Measures: safety rounds completed   Goal Outcome Evaluation:    Plan of Care Reviewed With: patient

## 2022-08-18 NOTE — PLAN OF CARE
[facility] Occupational Therapy Group Intervention Note     08/18/22 1232   Group Therapy Session   Group Attendance attended group sessions x2   Group Type task skill;psychoeducation;life skill   Group Topic Covered coping skills/lifestyle management;leisure exploration/use of leisure time;relationship   Group Session Detail 1) clinic - coping skill exploration, creative expression within personally meaningful activities, and observation of social, cognitive, and kinesthetic performance skills    2) discussion and utilization of social emotional supports. education provided on various types of community resources and supportive relationship.    Patient Response/Contribution cooperative with task;discussed personal experience with topic;listened actively;offered helpful suggestions to peers   Patient Response Detail 1) Pleasant and cooperative. Appropriate and social with peers; provided positive feedback and encouragement to others.     2) Engaged in group discussion. Shared personal experiences with animals as emotional supports. Demonstrated positive reaction to therapy dog.      Opal Jaeger OT on 8/18/2022 at 12:33 PM

## 2022-08-18 NOTE — PROGRESS NOTES
"    ----------------------------------------------------------------------------------------------------------  [facility]  Psychiatric Progress Note  Hospital Day #33    See Mendenhall MRN# [id number]   Age: 31 year old YOB: 1990   Date of Admission: 7/14/2022     Subjective   The patient was discussed with the treatment team and chart notes were reviewed.      Identifier: See Mendenhall is a 31 year old man with a past psychiatric diagnosis of  Bipolar 2 disorder, substance use disorder, depressive disorder, anxiety disorder, PTSD and ADHD.     Sleep:  7 hours (08/18/22 0600)   Prescribed Medications: Taken as prescribed  PRN Psychiatric Medications: acetaminophen, albuterol, alum & mag hydroxide-simethicone, hydrocortisone, hydrOXYzine, lidocaine 4%, nicotine, OLANZapine, QUEtiapine    Overnight Nursing Notes/Staff Report:  \"Pt appeared to have slept for 7 hours. Pt did not complain of pain or discomfort, and no PRN medication was given, 15 minutes safety checks  was in place during shift. Staff will continue to offer support to pt.\"    Patient interview:  See Mendenhall was interviewed in the conference room. He told us about the petition that he signed yesterday. Feels that he is doing good as long as he is taking zyprexa. He also is feeling that suboxone is helping and asked why the dose is not back to PTA. This has been helping with craving a lot. He proposed to go to [employer] program\"[facility] [employer]\" which would help him transitioning.    -------------------------------------------------------------------------------------------------------------------------  Suicidal ideation: denies current or recent suicidal ideation or behaviors.  Homicidal ideation: denies current or recent homicidal ideation or behaviors.  Psychotic symptoms: Patient denies AH, VH, paranoia, delusions.     Medication side effects reported: No significant side effects.  Acute medical concerns: " "none     ROS   ROS was negative unless noted above.     Objective   Vitals:  Temp: 97.3  F (36.3  C) (Temp  Min: 96.7  F (35.9  C)  Max: 97.3  F (36.3  C))  Resp: 18 (Resp  Min: 18  Max: 18)  SpO2: 98 % (SpO2  Min: 96 %  Max: 98 %)  Pulse: 68 (Pulse  Min: 67  Max: 68)  BP: 138/84  Systolic (24hrs), Av , Min:124 , Max:138     Diastolic (24hrs), Av, Min:80, Max:84      Mental Status Examination:  Oriented to:  Person/Self, Situation, Date and location  General: Awake and Alert  Appearance:  appears stated age, Posture is relaxed in chair, Grooming is adequate and Dressed in street clothing  Behavior:  calm, cooperative and engaged  Eye Contact:  adequate  Psychomotor:  no abnormal motor symptoms appreciated; no catatonia present  Speech:  appropriate volume/tone, talkative and with good articulation  Language: Fluent in English with appropriate syntax and vocabulary.  Mood:  \"doing good as long as he is taking zyprexa\"  Affect:  appropriate, congruent with mood and broad/full  Thought Process:  linear, goal directed and perseveration  Thought Content: No SI/HI/AH/VH; No apparent delusions  Associations:  intact  Insight:  fair due to recognizing need to go to CD  Judgment:  fair due to willingness to engage with treatment and go to CD  Attention Span: adequate for conversation  Concentration:  grossly intact  Recent and Remote Memory:  not formally assessed  Fund of Knowledge: average     Allergies     Allergies   Allergen Reactions     Other Drug Allergy (See Comments)      Fake metal        Labs & Imaging     Results for orders placed or performed during the hospital encounter of 22 (from the past 24 hour(s))   EKG 12-lead, complete   Result Value Ref Range    Systolic Blood Pressure  mmHg    Diastolic Blood Pressure  mmHg    Ventricular Rate 83 BPM    Atrial Rate 83 BPM    NY Interval 154 ms    QRS Duration 82 ms     ms    QTc 439 ms    P Axis 57 degrees    R AXIS -2 degrees    T Axis 20 " degrees    Interpretation ECG       Sinus rhythm  Increased R/S ratio in V1, consider early transition or posterior infarct  Abnormal ECG  When compared with ECG of 16-JUL-2022 09:06,  No significant change was found            Assessment   Diagnostic Impression:  See Mendenhall is a 31 year old gentleman with a past psychiatric diagnosis of  Bipolar 2 disorder, substance use disorder, depressive disorder and anxiety disorder, PTSD and ADHD. He was admitted from the ER on 07/16/2022 where he was brought by the police due to concern for psychosis and SI with plan. This in the context of methamphetamine and cocaine use, unclear regarding medication non-adherence, he has significant history of substance use and psychosocial stressors including housing instability, unemployment, legal issues, trauma and chronic mental health issues.     He was brought to the ED with SI, erratic and impulsive behaviors and psychosis (paranoid delusions and disorganized behavior/thought process) in the context of methamphetamine and cocaine use.  His last psychiatric hospitalization was in April/2022 at [facility] for psychosis in context of substance use.  He is currently followed by PCP Erinn Maradiaga at [facility]. Current psychosocial stressors include legal issues, trauma, chronic mental health issues, family dynamics and medical issues which he has been coping with by using substances, acting out to self and acting out to others.  Patient's support system includes sister Samaria and friend Wilbert.      Substance use does appear to be playing a contributing role in the patient's presentation. Medical history does not appear to be significant, although chart review indicates history of chronic back pain, prescribed anabolic steroids that may be playing a role in presentation. In utero exposure and developmental delay need to be assessed.       Psychosocially history of trauma in childhood, long history of substance  use, currently being unemployed and experiencing housing insecurity, legal issues, not being able to see his daughter represent both precipitating and perpetuating factors contributing to presentation.      The MSE is notable for some persisting paranoia, mood lability, being guarded, tangentiality and limited attention span/concentration, hyper-talkativeness difficult to interrupt speech. He denies self injurious behaviors. His reported symptoms of VH, paranoid delusions and grossly disorganized thought process in the context of methamphetamine and cocaine use are suggestive of substance-induced psychosis, although definitive diagnosis is still in evolution and further collateral information from family/ outpatient provider is needed at this time; differential includes primary psychotic disorder exacerbated by substance use, PTSD, Bipolar Disorder, schizoaffective disorder.  Additionally, he has traits of impulsive behaviors which interfere with his ability to adhere with the treatment plan.       Optimization of medications to target these symptom clusters in addition to evaluation of adequate community supports will be targets for treatment during this admission.      Pt has history of previous attempts and substance use representing heightened acute risk for self/others patient warrants inpatient psychiatric hospitalization to maintain his safety until door to door treatment can be arranged. Disposition pending clinical stabilization, medication optimization and development of an appropriate discharge plan, including CD treatment referral upon discharge.     Principal Diagnoses:   Substance use disorder (stimuland, opioid, sedative hypnotic)    Secondary Diagnoses:   Substance induced psychosis, now resolved  Major depressive disorder, recurrent episode, moderate   Generalized anxiety disorder    Psychiatric & Medical course:  See Mendenhall was admitted to [facility] on a 72 hour hold, transferred to Station  12 following a conflict with another patient. Patient in question has been removed from Station 20, so now Mr. Mendenhall has returned to Station 20. His PTA prazosin was continued.      See Mendenhall continued to meet criteria for inpatient hospitalization medication optimization, inpatient stabilization, and appropriate discharge planning.      7/19: added Seroquel 50 mg BEDTIME; 25 mg TID PRN   7/20: Quetiapine (Seroquel) increased to 100 mg, BEDTIME scheduled. Keep 25 mg TID PRN  7/20: melatonin 3mg scheduled per patient request  7/25: Pending confirmation of CARE referral and status on list with outpatient CM. If anticipated to be a prolonged wait for placement, will consider less restrictive options for CD.  7/26: Ongoing issues with outpatient CM Agatha not returning messages or answering calls from team or patient. Message left with CM supervisor given persistent issues reaching CM.  7/27: OP CM strongly advocating for CARE placement given patient's history of several failed treatments and violent/aggressive behaviors in context of substance use. OP CM did confirm that she placed CARE referral. Pt continues to voice frustration about length of stay and delay in CARE referral as he was informed that the referral was placed over one week ago.   7/28: Returned to [facility] 20 from [facility]. CARE placement planned, likely will be available within two weeks. Mr. Mendenhall requests audience with  (Agatha) to explain circumstances of relapse and admission, even if it will not change the course of treatment or discharge to CARE facility, still wishes to clarify details. Changed scheduled SEROQUEL to ZYPREXA 10 mg at bedtime. SEROQUEL ordered PRN  7/29: CARE placement planned, but Mr. Mendenhall continues to request audience with  (Agatha). Should have been placed on list for CARE facility previously as he is on hospital day 13. Will ensure this is done today.  scheduled to visit unit  "and speak with Mr. Mendenhall on 8/1. Persistent anxiety, request for valium denied due to CD history and need to maintain CARE eligibility. Increased PRN ZYPREXA to accommodate maximum total dose of 40 mg/day (along with scheduled). Complains of shoulder pain s/p rotator cuff surgery and biceps tear in 12/2021, request for tramadol denied due to CD history. Ordered PRN naproxen and PRN lidocaine cream instead.  8/1: Seasonal allergies; ordered PRN Cetirizine 10 mg   8/2: patient requested testosterone supplementation; ordered serum testosterone and SHBG  8/3: Added desvenlafaxine 25 mg daily and added PRN melatonin 3 mg to existing scheduled melatonin 3 mg QPM; Testosterone labs pending.  8/8: Testosterone  & Sex Hormone Binding Globulin levels within normal range; results shared with patient.  8/10: increased desvenlafaxine to 50 mg daily, changed lidocaine gel to patch for biceps pain  8/12: IPCD consult for suboxone and Code 3 exception : \"Please consider exception to patient activities off of secure unit policy (Code 3). Patient would be escorted by security.\"  8/15: Increased PRN Tylenol and Naproxen  8/17: Started suboxone 2-0.5 mg today for two doses; increase according to Dr. Roldan's consult note     Plan   Today's Changes (PRN medications):     None  _______________________________________________________________________  Psychiatric Management:    Prazosin 1mg at bedtime    Quetiapine 100mg at bedtime    Civil commitment with Das    Additional Planning:    Continue to monitor for and stabilize: irritable, psychosis and substance use    Patient will be treated in therapeutic milieu with appropriate individual and group therapies as described.    Scheduled Medications (summary):  Current Facility-Administered Medications   Medication Dose Route Frequency     buprenorphine HCl-naloxone HCl  1 Film Sublingual BID     cetirizine  10 mg Oral Daily     desvenlafaxine  50 mg Oral Daily     lidocaine  1 patch " Transdermal Q24H     lidocaine   Transdermal Q8H ALBA     melatonin  3 mg Oral At Bedtime     multivitamin w/minerals  1 tablet Oral Daily     OLANZapine  10 mg Oral At Bedtime     polyethylene glycol  17 g Oral Daily     prazosin  1 mg Oral At Bedtime     PRN Medications (summary):  Current Facility-Administered Medications   Medication Dose Route Frequency     acetaminophen  975 mg Oral Q6H PRN     albuterol  2 puff Inhalation Q6H PRN     alum & mag hydroxide-simethicone  30 mL Oral Q4H PRN     hydrocortisone   Topical BID PRN     hydrOXYzine  25 mg Oral Q4H PRN     melatonin  3 mg Oral At Bedtime PRN     naproxen  250 mg Oral 4x Daily PRN     nicotine  4 mg Buccal Q1H PRN     OLANZapine  5-10 mg Oral TID PRN    Or     OLANZapine  10 mg Intramuscular TID PRN     QUEtiapine  125 mg Oral At Bedtime PRN     QUEtiapine  25-50 mg Oral TID PRN     Legal Status:    Das    Committed     SIO:    No    Pt has not required locked seclusion or restraints in the past 24 hours to maintain safety, please refer to RN documentation for further details.    Safety Assessment:   Behavioral Orders   Procedures     Assault precautions     Code 1 - Restrict to Unit     Code 3     Routine Programming     As clinically indicated     Status 15     Every 15 minutes.     Disposition:    Reason for ongoing admission:  Civil commitment pending placement for CD for direct transfer    Discharge location: TBD, pending clinical stabilization, medication optimization, and formulation of a safe discharge plan. Likely CARE, possibly within the next 14 days.      Discharge Medications: not ordered    Follow-up Appointments: not scheduled  ____________________________________________________________________  Pertinent Medical diagnoses and management:    None  ____________________________________________________________________  Patient seen and discussed with my resident physician, Dr. Theresa Reese MD and attending physician Dr. Galina De Jesus MD  who are in agreement with my assessment and plan.    Nick Coates, MS3    Attestation:   I was present with the medical student who participated in the service and in the documentation of the note. I have verified the history and personally performed the physical exam and medical decision making. I agree with the assessment and plan of care as documented in the note.    Rik Forbes, PGY-2 (Psychiatry)  [facility]    Attestation:  This patient has been seen and evaluated by me, Galina De Jesus MD.  I have discussed this patient with the house staff team including the resident and medical student and I agree with the findings and plan in this note.    I have reviewed today's vital signs, medications, labs and imaging. Galina De Jesus MD , PhD.

## 2022-08-18 NOTE — PLAN OF CARE
Assessment/Intervention/Current Symtoms and Care Coordination  The patient's care was discussed with the treatment team and chart notes were reviewed  Patient met with team.    Patient has been attending groups, compliant with meds, increasing suboxone     Patient completed CD assessment.  Patient has Medicare but needs MA for placement.     Obstacle with getting bank statements to assist in obtaining MA. Pt is still awaiting his MA being activated.  Once MA is active CD referrals will be sent out.     Pt called [facility] telling them that he wants to go to their program.  James the director - Phone; [phone] called to discuss the pt.  Returned call and left vm.       Discharge Plan or Goal  CARE facility VS residential treatment  Due to long wait time at CARE - supervisor is now agreeable to a possible residential outpatient program vs CARE.      Barriers to Discharge   County initially requiring placement to CARE facility due to h/o repeatedly elopement from residential placements but now willing to consider residential.    Referrals will be made once MA is activated.     Referral Status  None today     Legal Status     Civil commitment with Das order

## 2022-08-19 LAB — SARS-COV-2 RNA RESP QL NAA+PROBE: NEGATIVE

## 2022-08-19 PROCEDURE — 250N000013 HC RX MED GY IP 250 OP 250 PS 637

## 2022-08-19 PROCEDURE — 250N000013 HC RX MED GY IP 250 OP 250 PS 637: Performed by: PSYCHIATRY & NEUROLOGY

## 2022-08-19 PROCEDURE — 250N000013 HC RX MED GY IP 250 OP 250 PS 637: Performed by: HOSPITALIST

## 2022-08-19 PROCEDURE — U0005 INFEC AGEN DETEC AMPLI PROBE: HCPCS

## 2022-08-19 PROCEDURE — 124N000002 HC R&B MH UMMC

## 2022-08-19 PROCEDURE — 99232 SBSQ HOSP IP/OBS MODERATE 35: CPT | Mod: GC | Performed by: PSYCHIATRY & NEUROLOGY

## 2022-08-19 PROCEDURE — G0177 OPPS/PHP; TRAIN & EDUC SERV: HCPCS

## 2022-08-19 PROCEDURE — XW023U6 INTRODUCTION OF COVID-19 VACCINE INTO MUSCLE, PERCUTANEOUS APPROACH, NEW TECHNOLOGY GROUP 6: ICD-10-PCS | Performed by: PSYCHIATRY & NEUROLOGY

## 2022-08-19 RX ORDER — DIPHENHYDRAMINE HCL 25 MG
50 CAPSULE ORAL
Status: DISCONTINUED | OUTPATIENT
Start: 2022-08-19 | End: 2022-08-29 | Stop reason: HOSPADM

## 2022-08-19 RX ORDER — DIPHENHYDRAMINE HYDROCHLORIDE 50 MG/ML
50 INJECTION INTRAMUSCULAR; INTRAVENOUS
Status: DISCONTINUED | OUTPATIENT
Start: 2022-08-19 | End: 2022-08-29 | Stop reason: HOSPADM

## 2022-08-19 RX ADMIN — NICOTINE POLACRILEX 4 MG: 4 GUM, CHEWING BUCCAL at 14:09

## 2022-08-19 RX ADMIN — OLANZAPINE 5 MG: 5 TABLET, FILM COATED ORAL at 11:36

## 2022-08-19 RX ADMIN — CETIRIZINE HYDROCHLORIDE 10 MG: 10 TABLET, FILM COATED ORAL at 09:50

## 2022-08-19 RX ADMIN — ALBUTEROL SULFATE 2 PUFF: 90 AEROSOL, METERED RESPIRATORY (INHALATION) at 18:20

## 2022-08-19 RX ADMIN — LIDOCAINE PATCH 4% 1 PATCH: 40 PATCH TOPICAL at 09:50

## 2022-08-19 RX ADMIN — OLANZAPINE 10 MG: 10 TABLET, FILM COATED ORAL at 21:01

## 2022-08-19 RX ADMIN — NICOTINE POLACRILEX 4 MG: 4 GUM, CHEWING BUCCAL at 11:54

## 2022-08-19 RX ADMIN — NICOTINE POLACRILEX 4 MG: 4 GUM, CHEWING BUCCAL at 20:59

## 2022-08-19 RX ADMIN — HYDROXYZINE HYDROCHLORIDE 25 MG: 25 TABLET ORAL at 21:25

## 2022-08-19 RX ADMIN — ACETAMINOPHEN 975 MG: 325 TABLET, FILM COATED ORAL at 14:08

## 2022-08-19 RX ADMIN — MULTIPLE VITAMINS W/ MINERALS TAB 1 TABLET: TAB at 09:51

## 2022-08-19 RX ADMIN — POLYETHYLENE GLYCOL 3350 17 G: 17 POWDER, FOR SOLUTION ORAL at 09:50

## 2022-08-19 RX ADMIN — NAPROXEN 250 MG: 250 TABLET ORAL at 19:53

## 2022-08-19 RX ADMIN — PRAZOSIN HYDROCHLORIDE 1 MG: 1 CAPSULE ORAL at 21:01

## 2022-08-19 RX ADMIN — NICOTINE POLACRILEX 4 MG: 4 GUM, CHEWING BUCCAL at 19:53

## 2022-08-19 RX ADMIN — BUPRENORPHINE AND NALOXONE 1 FILM: 4; 1 FILM, SOLUBLE BUCCAL; SUBLINGUAL at 09:51

## 2022-08-19 RX ADMIN — DESVENLAFAXINE SUCCINATE 50 MG: 50 TABLET, EXTENDED RELEASE ORAL at 09:51

## 2022-08-19 RX ADMIN — MELATONIN TAB 3 MG 3 MG: 3 TAB at 21:01

## 2022-08-19 RX ADMIN — NICOTINE POLACRILEX 4 MG: 4 GUM, CHEWING BUCCAL at 18:20

## 2022-08-19 RX ADMIN — NICOTINE POLACRILEX 4 MG: 4 GUM, CHEWING BUCCAL at 09:51

## 2022-08-19 RX ADMIN — QUETIAPINE FUMARATE 125 MG: 100 TABLET ORAL at 21:05

## 2022-08-19 RX ADMIN — BUPRENORPHINE AND NALOXONE 1 FILM: 4; 1 FILM, SOLUBLE BUCCAL; SUBLINGUAL at 19:02

## 2022-08-19 ASSESSMENT — ACTIVITIES OF DAILY LIVING (ADL)
ADLS_ACUITY_SCORE: 29
ADLS_ACUITY_SCORE: 29
DRESS: INDEPENDENT
ORAL_HYGIENE: INDEPENDENT
ADLS_ACUITY_SCORE: 29
DRESS: INDEPENDENT
ADLS_ACUITY_SCORE: 29
HYGIENE/GROOMING: INDEPENDENT
ADLS_ACUITY_SCORE: 29
ORAL_HYGIENE: INDEPENDENT
ADLS_ACUITY_SCORE: 29
LAUNDRY: WITH SUPERVISION
LAUNDRY: WITH SUPERVISION
ADLS_ACUITY_SCORE: 29
HYGIENE/GROOMING: INDEPENDENT

## 2022-08-19 NOTE — PLAN OF CARE
Patient denied all psych symptoms and contracted for safety. He was isolative to room at start of shift but later came out to the [facility] where he spent the majority of time thereafter. In light of recent abnormal EKG, writer assessed patient at provider's request for any signs of chest pain, diaphoresis, increased anxiety among others but patient was in bed at that time and he denied all symptoms but was quite dismissive. He also denied such symptoms when reassessed later. PRNs given this shift: Nicotine gum x3, Seroquel and naproxen      Problem: Plan of Care - These are the overarching goals to be used throughout the patient stay.    Goal: Optimal Comfort and Wellbeing  Outcome: Ongoing, Progressing

## 2022-08-19 NOTE — PROGRESS NOTES
"    ----------------------------------------------------------------------------------------------------------  [facility]  Psychiatric Progress Note  Hospital Day #34    See Mendenhall MRN# [id number]   Age: 31 year old YOB: 1990   Date of Admission: 7/14/2022     Subjective   The patient was discussed with the treatment team and chart notes were reviewed.      Identifier: See Mendenhall is a 31 year old man with a past psychiatric diagnosis of  Bipolar 2 disorder, substance use disorder, depressive disorder, anxiety disorder, PTSD and ADHD.     Sleep:  7 hours (08/19/22 0700)   Prescribed Medications: Taken as prescribed  PRN Psychiatric Medications: acetaminophen, albuterol, alum & mag hydroxide-simethicone, hydrocortisone, hydrOXYzine, lidocaine 4%, nicotine, OLANZapine, QUEtiapine    Overnight Nursing Notes/Staff Report:  \"Pt appeared to have slept for 7 hours. Pt did not complain of pain or discomfort, and no PRN medication was given, 15 minutes safety checks  was in place during shift. Staff will continue to offer support to pt.\"    Patient interview:  See Mendenhall was interviewed in the conference room. States he is just waiting. Requests increased nicotine gum. Endorses arm pain, and discussed canceling his appointment on 8/22 as he will be in the hospital. He was reluctant, wanted staff to call [facility] to explain he is in the hospital; treatment team explained he scheduled appointment and should call to cancel, offered to speak to surgeon's office if they do not want to cancel appointment. Discussed continuing to work on getting bank statements mailed to prior residence. Requested increase in nicotine gum but explained he is at maximum. Mr. Mendenhall had no other concerns or requests.   -------------------------------------------------------------------------------------------------------------------------  Suicidal ideation: denies current " "or recent suicidal ideation or behaviors.  Homicidal ideation: denies current or recent homicidal ideation or behaviors.  Psychotic symptoms: Patient denies AH, VH, paranoia, delusions.     Medication side effects reported: No significant side effects.  Acute medical concerns: none     ROS   ROS was negative unless noted above.     Objective   Vitals:  Temp: 97.6  F (36.4  C) (Temp  Min: 97.6  F (36.4  C)  Max: 97.6  F (36.4  C))    (No data recorded)  SpO2: 95 % (SpO2  Min: 95 %  Max: 95 %)  Pulse: 76 (Pulse  Min: 76  Max: 76)  BP: (!) 146/83  Systolic (24hrs), Av , Min:145 , Max:146     Diastolic (24hrs), Av, Min:83, Max:84      Mental Status Examination:  Oriented to:  Person/Self, Situation, Date and location  General: Awake and Alert  Appearance:  appears stated age, Posture is slightly anxious leainig forward in chair with some tremor noted, Grooming is adequate and Dressed in street clothing  Behavior:  calm, cooperative and engaged  Eye Contact:  adequate  Psychomotor:  no abnormal motor symptoms appreciated; no catatonia present  Speech:  appropriate volume/tone, talkative and with good articulation  Language: Fluent in English with appropriate syntax and vocabulary.  Mood:  \"doing okay\"  Affect:  appropriate, congruent with mood and broad/full  Thought Process:  linear, goal directed and perseveration  Thought Content: No SI/HI/AH/VH; No apparent delusions  Associations:  intact  Insight:  fair due to recognizing need to go to CD  Judgment:  fair due to willingness to engage with treatment and go to CD  Attention Span: adequate for conversation  Concentration:  grossly intact  Recent and Remote Memory:  not formally assessed  Fund of Knowledge: average     Allergies     Allergies   Allergen Reactions     Other Drug Allergy (See Comments)      Fake metal        Labs & Imaging     No results found for this or any previous visit (from the past 24 hour(s)).       Assessment   Diagnostic " Impression:  See Mendenhall is a 31 year old gentleman with a past psychiatric diagnosis of  Bipolar 2 disorder, substance use disorder, depressive disorder and anxiety disorder, PTSD and ADHD. He was admitted from the ER on 07/16/2022 where he was brought by the police due to concern for psychosis and SI with plan. This in the context of methamphetamine and cocaine use, unclear regarding medication non-adherence, he has significant history of substance use and psychosocial stressors including housing instability, unemployment, legal issues, trauma and chronic mental health issues.     He was brought to the ED with SI, erratic and impulsive behaviors and psychosis (paranoid delusions and disorganized behavior/thought process) in the context of methamphetamine and cocaine use.  His last psychiatric hospitalization was in April/2022 at [facility] for psychosis in context of substance use.  He is currently followed by PCP Erinn Maradiaga at [facility]. Current psychosocial stressors include legal issues, trauma, chronic mental health issues, family dynamics and medical issues which he has been coping with by using substances, acting out to self and acting out to others.  Patient's support system includes sister Samaria and friend Wilbert.      Substance use does appear to be playing a contributing role in the patient's presentation. Medical history does not appear to be significant, although chart review indicates history of chronic back pain, prescribed anabolic steroids that may be playing a role in presentation. In utero exposure and developmental delay need to be assessed.       Psychosocially history of trauma in childhood, long history of substance use, currently being unemployed and experiencing housing insecurity, legal issues, not being able to see his daughter represent both precipitating and perpetuating factors contributing to presentation.      The MSE is notable for some persisting  paranoia, mood lability, being guarded, tangentiality and limited attention span/concentration, hyper-talkativeness difficult to interrupt speech. He denies self injurious behaviors. His reported symptoms of VH, paranoid delusions and grossly disorganized thought process in the context of methamphetamine and cocaine use are suggestive of substance-induced psychosis, although definitive diagnosis is still in evolution and further collateral information from family/ outpatient provider is needed at this time; differential includes primary psychotic disorder exacerbated by substance use, PTSD, Bipolar Disorder, schizoaffective disorder.  Additionally, he has traits of impulsive behaviors which interfere with his ability to adhere with the treatment plan.       Optimization of medications to target these symptom clusters in addition to evaluation of adequate community supports will be targets for treatment during this admission.      Pt has history of previous attempts and substance use representing heightened acute risk for self/others patient warrants inpatient psychiatric hospitalization to maintain his safety until door to door treatment can be arranged. Disposition pending clinical stabilization, medication optimization and development of an appropriate discharge plan, including CD treatment referral upon discharge.     Principal Diagnoses:   Substance use disorder (stimuland, opioid, sedative hypnotic)    Secondary Diagnoses:   Substance induced psychosis, now resolved  Major depressive disorder, recurrent episode, moderate   Generalized anxiety disorder    Psychiatric & Medical course:  See Mendenhall was admitted to [facility] on a 72 hour hold, transferred to Station 12 following a conflict with another patient. Patient in question has been removed from Station 20, so now Mr. Mendenhall has returned to Station 20. His PTA prazosin was continued.      See Mendenhall continued to meet criteria for inpatient  hospitalization medication optimization, inpatient stabilization, and appropriate discharge planning.      7/19: added Seroquel 50 mg BEDTIME; 25 mg TID PRN   7/20: Quetiapine (Seroquel) increased to 100 mg, BEDTIME scheduled. Keep 25 mg TID PRN  7/20: melatonin 3mg scheduled per patient request  7/25: Pending confirmation of CARE referral and status on list with outpatient CM. If anticipated to be a prolonged wait for placement, will consider less restrictive options for CD.  7/26: Ongoing issues with outpatient CM Agatha not returning messages or answering calls from team or patient. Message left with CM supervisor given persistent issues reaching CM.  7/27: OP CM strongly advocating for CARE placement given patient's history of several failed treatments and violent/aggressive behaviors in context of substance use. OP CM did confirm that she placed CARE referral. Pt continues to voice frustration about length of stay and delay in CARE referral as he was informed that the referral was placed over one week ago.   7/28: Returned to Station 20 from [facility] 12. CARE placement planned, likely will be available within two weeks. Mr. Mendenhall requests audience with  (Agatha) to explain circumstances of relapse and admission, even if it will not change the course of treatment or discharge to CARE facility, still wishes to clarify details. Changed scheduled SEROQUEL to ZYPREXA 10 mg at bedtime. SEROQUEL ordered PRN  7/29: CARE placement planned, but Mr. Mendenhall continues to request audience with  (Agatha). Should have been placed on list for CARE facility previously as he is on hospital day 13. Will ensure this is done today.  scheduled to visit unit and speak with Mr. Mendenhall on 8/1. Persistent anxiety, request for valium denied due to CD history and need to maintain CARE eligibility. Increased PRN ZYPREXA to accommodate maximum total dose of 40 mg/day (along with scheduled). Complains of  "shoulder pain s/p rotator cuff surgery and biceps tear in 12/2021, request for tramadol denied due to CD history. Ordered PRN naproxen and PRN lidocaine cream instead.  8/1: Seasonal allergies; ordered PRN Cetirizine 10 mg   8/2: patient requested testosterone supplementation; ordered serum testosterone and SHBG  8/3: Added desvenlafaxine 25 mg daily and added PRN melatonin 3 mg to existing scheduled melatonin 3 mg QPM; Testosterone labs pending.  8/8: Testosterone  & Sex Hormone Binding Globulin levels within normal range; results shared with patient.  8/10: increased desvenlafaxine to 50 mg daily, changed lidocaine gel to patch for biceps pain  8/12: IPCD consult for suboxone and Code 3 exception : \"Please consider exception to patient activities off of secure unit policy (Code 3). Patient would be escorted by security.\"  8/15: Increased PRN Tylenol and Naproxen  8/17: Started suboxone 2-0.5 mg today for two doses; increase according to Dr. Roldan's consult note     Plan   Today's Changes (PRN medications):     None  _______________________________________________________________________  Psychiatric Management:    Prazosin 1mg at bedtime    Quetiapine 100mg at bedtime    Civil commitment with Das    Additional Planning:    Continue to monitor for and stabilize: irritable, psychosis and substance use    Patient will be treated in therapeutic milieu with appropriate individual and group therapies as described.    Scheduled Medications (summary):  Current Facility-Administered Medications   Medication Dose Route Frequency     buprenorphine HCl-naloxone HCl  1 Film Sublingual BID     cetirizine  10 mg Oral Daily     [START ON 8/20/2022] COVID-19 mRNA vacc (PFIZER)  30 mcg Intramuscular Prior to discharge     desvenlafaxine  50 mg Oral Daily     lidocaine  1 patch Transdermal Q24H     lidocaine   Transdermal Q8H ALBA     melatonin  3 mg Oral At Bedtime     multivitamin w/minerals  1 tablet Oral Daily     OLANZapine "  10 mg Oral At Bedtime     polyethylene glycol  17 g Oral Daily     prazosin  1 mg Oral At Bedtime     PRN Medications (summary):  Current Facility-Administered Medications   Medication Dose Route Frequency     acetaminophen  975 mg Oral Q6H PRN     albuterol  2 puff Inhalation Q6H PRN     alum & mag hydroxide-simethicone  30 mL Oral Q4H PRN     diphenhydrAMINE  50 mg Oral Once PRN    Or     diphenhydrAMINE  50 mg IV/IM Once PRN     EPINEPHrine  0.3 mg Intramuscular Q5 Min PRN     hydrocortisone   Topical BID PRN     hydrOXYzine  25 mg Oral Q4H PRN     melatonin  3 mg Oral At Bedtime PRN     naproxen  250 mg Oral 4x Daily PRN     nicotine  4 mg Buccal Q1H PRN     OLANZapine  5-10 mg Oral TID PRN    Or     OLANZapine  10 mg Intramuscular TID PRN     QUEtiapine  125 mg Oral At Bedtime PRN     QUEtiapine  25-50 mg Oral TID PRN     Legal Status:    Das    Committed     SIO:    No    Pt has not required locked seclusion or restraints in the past 24 hours to maintain safety, please refer to RN documentation for further details.    Safety Assessment:   Behavioral Orders   Procedures     Assault precautions     Code 1 - Restrict to Unit     Code 3     Routine Programming     As clinically indicated     Status 15     Every 15 minutes.     Disposition:    Reason for ongoing admission:  Civil commitment pending placement for CD for direct transfer    Discharge location: TBD, pending clinical stabilization, medication optimization, and formulation of a safe discharge plan. Likely CARE, possibly within the next 14 days.      Discharge Medications: not ordered    Follow-up Appointments: not scheduled  ____________________________________________________________________  Pertinent Medical diagnoses and management:    None  ____________________________________________________________________  Patient seen and discussed with my resident physician, Dr. Theresa Reese MD and attending physician Dr. Galina De Jesus MD who are in  agreement with my assessment and plan.    Nick Coates, MS3    The patient was seen and discussed with the medical student. I concur with the student's note.  Theresa Reese MD  PGY1 Psychiatry Resident      Attestation:  This patient has been seen and evaluated by me, Galina De Jesus MD.  I have discussed this patient with the house staff team including the resident and medical student and I agree with the findings and plan in this note.    I have reviewed today's vital signs, medications, labs and imaging. Galina De Jesus MD , PhD.

## 2022-08-19 NOTE — PLAN OF CARE
Problem: Behavioral Health Plan of Care  Goal: Optimized Coping Skills in Response to Life Stressors  Outcome: Ongoing, Progressing  Intervention: Promote Effective Coping Strategies  Recent Flowsheet Documentation  Taken 8/18/2022 1800 by Josh Covarrubias RN  Supportive Measures:    self-care encouraged    self-reflection promoted  Goal: Develops/Participates in Therapeutic [place] to Support Successful Transition  Outcome: Ongoing, Progressing  Intervention: Foster Therapeutic [place]  Recent Flowsheet Documentation  Taken 8/18/2022 1800 by Josh Covarrubias RN  Trust Relationship/Rapport:    empathic listening provided    emotional support provided    questions encouraged    questions answered    No abnormal behavior observed during shift. Patient was pleasant, cooperative, engaged, and proactive with ADLs and unit activities. He attended and participated in group activities, ate 100% of dinner and HS snacks, contracted for safety, and was compliant with medications. He denied SI, HI, SIB, pain, and all other psych symptoms. He spent some time in the milieu watching TV and socializing with peers. His vitals were normal  and he was alert and oriented x4. No assault or psychotic behavior was observed. However, was demanding and medications seeking towards end of shift. Staff will continue to encourage and monitor.

## 2022-08-19 NOTE — PROGRESS NOTES
08/18/22 2200   Group Therapy Session   Group Attendance attended group session   Time Session Began 2015   Time Session Ended 2115   Total Time patient participated (minutes) 60   Total # Attendees 5   Group Type psychotherapeutic   Group Topic Covered emotions/expression;relaxation techniques;structured socialization   Group Session Detail Mindfulness   Patient Response/Contribution cooperative with task;discussed personal experience with topic;expressed readiness to alter behaviors;listened actively;offered helpful suggestions to peers   Mood reported as pretty good, he was very participatory in art and discussion. He made a thoughtful image with the metaphor of the egg. He gave positive feedback to peers and gave some book recommendations to writer and the group.

## 2022-08-19 NOTE — PLAN OF CARE
Pt has been in a calm, pleasant mood throughout this day shift. He has been appropriate in his interactions with staff; has been advocating appropriately for his needs/requests- Covid booster, assistance with discharge planning, etc. There are currently no safety concerns. No PRN medications requested or given beyond nicotine gum. Covid swab collected and submitted this shift.    Problem: Behavioral Health Plan of Care  Goal: Plan of Care Review  Recent Flowsheet Documentation  Taken 8/19/2022 1149 by Marci Almanza  Plan of Care Reviewed With: patient  Patient Agreement with Plan of Care: agrees

## 2022-08-19 NOTE — PLAN OF CARE
Problem: Sleep Disturbance  Goal: Adequate Sleep/Rest  Outcome: Ongoing, Progressing   Goal Outcome Evaluation:  Patient appears to have slept for 7 hours. No complaints of pain. No requests for prn medications. Staff will continue to monitor and offer support.

## 2022-08-19 NOTE — PLAN OF CARE
Assessment/Intervention/Current Symtoms and Care Coordination  The patient's care was discussed with the treatment team and chart notes were reviewed  Patient met with team.    Patient has been attending groups, compliant with meds, increasing suboxone per mD's     Patient completed CD assessment.  Kiet Browne working on Rule 24 eligibility to fund CD treatment.      Patient called [facility] telling them that he wants to go to their program.  James the director - Phone; [phone] called to discuss the patient.  Returned call and left vm.       Discharge Plan or Goal  CARE facility VS residential treatment  Due to long wait time at CARE - supervisor is now agreeable to a possible residential outpatient program vs CARE.      Barriers to Discharge   [facility] initially requiring placement to CARE facility due to h/o repeatedly elopement from residential placements but now willing to consider residential.    CD assessment has been completed. Manoj Browne working on funding.     Referral Status  None today     Legal Status     Civil commitment with Das order

## 2022-08-20 PROCEDURE — 250N000013 HC RX MED GY IP 250 OP 250 PS 637: Performed by: HOSPITALIST

## 2022-08-20 PROCEDURE — 250N000011 HC RX IP 250 OP 636: Performed by: PSYCHIATRY & NEUROLOGY

## 2022-08-20 PROCEDURE — 250N000013 HC RX MED GY IP 250 OP 250 PS 637: Performed by: PSYCHIATRY & NEUROLOGY

## 2022-08-20 PROCEDURE — 0054A HC ADMIN COVID VAC PFIZER TRS-SUCR, BOOSTER: CPT | Performed by: PSYCHIATRY & NEUROLOGY

## 2022-08-20 PROCEDURE — 250N000013 HC RX MED GY IP 250 OP 250 PS 637

## 2022-08-20 PROCEDURE — 91305 HC RX IP 250 OP 636: CPT | Performed by: PSYCHIATRY & NEUROLOGY

## 2022-08-20 PROCEDURE — G0177 OPPS/PHP; TRAIN & EDUC SERV: HCPCS

## 2022-08-20 PROCEDURE — 124N000002 HC R&B MH UMMC

## 2022-08-20 RX ADMIN — OLANZAPINE 10 MG: 5 TABLET, FILM COATED ORAL at 14:49

## 2022-08-20 RX ADMIN — BNT162B2 30 MCG: 0.23 INJECTION, SUSPENSION INTRAMUSCULAR at 13:00

## 2022-08-20 RX ADMIN — NICOTINE POLACRILEX 4 MG: 4 GUM, CHEWING BUCCAL at 20:54

## 2022-08-20 RX ADMIN — MELATONIN TAB 3 MG 3 MG: 3 TAB at 20:58

## 2022-08-20 RX ADMIN — PRAZOSIN HYDROCHLORIDE 1 MG: 1 CAPSULE ORAL at 20:49

## 2022-08-20 RX ADMIN — MULTIPLE VITAMINS W/ MINERALS TAB 1 TABLET: TAB at 12:03

## 2022-08-20 RX ADMIN — BUPRENORPHINE AND NALOXONE 1 FILM: 4; 1 FILM, SOLUBLE BUCCAL; SUBLINGUAL at 20:50

## 2022-08-20 RX ADMIN — ACETAMINOPHEN 975 MG: 325 TABLET, FILM COATED ORAL at 13:38

## 2022-08-20 RX ADMIN — NICOTINE POLACRILEX 4 MG: 4 GUM, CHEWING BUCCAL at 15:55

## 2022-08-20 RX ADMIN — DESVENLAFAXINE SUCCINATE 50 MG: 50 TABLET, EXTENDED RELEASE ORAL at 12:03

## 2022-08-20 RX ADMIN — NICOTINE POLACRILEX 4 MG: 4 GUM, CHEWING BUCCAL at 13:38

## 2022-08-20 RX ADMIN — NAPROXEN 250 MG: 250 TABLET ORAL at 15:55

## 2022-08-20 RX ADMIN — NICOTINE POLACRILEX 4 MG: 4 GUM, CHEWING BUCCAL at 14:49

## 2022-08-20 RX ADMIN — POLYETHYLENE GLYCOL 3350 17 G: 17 POWDER, FOR SOLUTION ORAL at 12:03

## 2022-08-20 RX ADMIN — QUETIAPINE FUMARATE 125 MG: 100 TABLET ORAL at 20:54

## 2022-08-20 RX ADMIN — CETIRIZINE HYDROCHLORIDE 10 MG: 10 TABLET, FILM COATED ORAL at 12:03

## 2022-08-20 RX ADMIN — LIDOCAINE PATCH 4% 1 PATCH: 40 PATCH TOPICAL at 12:03

## 2022-08-20 RX ADMIN — BUPRENORPHINE AND NALOXONE 1 FILM: 4; 1 FILM, SOLUBLE BUCCAL; SUBLINGUAL at 12:07

## 2022-08-20 RX ADMIN — OLANZAPINE 10 MG: 10 TABLET, FILM COATED ORAL at 20:50

## 2022-08-20 ASSESSMENT — ACTIVITIES OF DAILY LIVING (ADL)
HYGIENE/GROOMING: INDEPENDENT
ADLS_ACUITY_SCORE: 29
ADLS_ACUITY_SCORE: 29
ORAL_HYGIENE: INDEPENDENT
ADLS_ACUITY_SCORE: 29
DRESS: INDEPENDENT
DRESS: INDEPENDENT
ADLS_ACUITY_SCORE: 29
LAUNDRY: WITH SUPERVISION
ADLS_ACUITY_SCORE: 29
ORAL_HYGIENE: INDEPENDENT
LAUNDRY: WITH SUPERVISION
ADLS_ACUITY_SCORE: 29
HYGIENE/GROOMING: INDEPENDENT
ADLS_ACUITY_SCORE: 29

## 2022-08-20 NOTE — PLAN OF CARE
Problem: Sleep Disturbance  Goal: Adequate Sleep/Rest  Outcome: Ongoing, Progressing   Goal Outcome Evaluation:  Patient appears to have slept for 7 hours. No acute events during the shift. No requests for prn medications. Respirations non-labored during routine q 15 minute safety  checks.

## 2022-08-20 NOTE — PLAN OF CARE
Pt slept in late today- until about noon. Over the last few days, he has been sleeping in much later than usual. However, his affect and mood remain full range and calm/pleasant. He is appropriate in his interactions with other patients and staff. He attended OT group today and actively participated. No behavioral or safety-related concerns this shift.    Problem: Behavioral Health Plan of Care  Goal: Plan of Care Review  Recent Flowsheet Documentation  Taken 8/20/2022 1348 by Marci Almanza  Plan of Care Reviewed With: patient  Patient Agreement with Plan of Care: agrees

## 2022-08-21 PROCEDURE — 124N000002 HC R&B MH UMMC

## 2022-08-21 PROCEDURE — 250N000013 HC RX MED GY IP 250 OP 250 PS 637: Performed by: PSYCHIATRY & NEUROLOGY

## 2022-08-21 PROCEDURE — 250N000013 HC RX MED GY IP 250 OP 250 PS 637

## 2022-08-21 PROCEDURE — 250N000013 HC RX MED GY IP 250 OP 250 PS 637: Performed by: HOSPITALIST

## 2022-08-21 RX ADMIN — POLYETHYLENE GLYCOL 3350 17 G: 17 POWDER, FOR SOLUTION ORAL at 10:29

## 2022-08-21 RX ADMIN — PRAZOSIN HYDROCHLORIDE 1 MG: 1 CAPSULE ORAL at 20:58

## 2022-08-21 RX ADMIN — NICOTINE POLACRILEX 4 MG: 4 GUM, CHEWING BUCCAL at 11:56

## 2022-08-21 RX ADMIN — NICOTINE POLACRILEX 4 MG: 4 GUM, CHEWING BUCCAL at 12:49

## 2022-08-21 RX ADMIN — NICOTINE POLACRILEX 4 MG: 4 GUM, CHEWING BUCCAL at 10:30

## 2022-08-21 RX ADMIN — BUPRENORPHINE AND NALOXONE 1 FILM: 4; 1 FILM, SOLUBLE BUCCAL; SUBLINGUAL at 19:03

## 2022-08-21 RX ADMIN — NICOTINE POLACRILEX 4 MG: 4 GUM, CHEWING BUCCAL at 18:54

## 2022-08-21 RX ADMIN — NAPROXEN 250 MG: 250 TABLET ORAL at 12:48

## 2022-08-21 RX ADMIN — NICOTINE POLACRILEX 4 MG: 4 GUM, CHEWING BUCCAL at 14:55

## 2022-08-21 RX ADMIN — OLANZAPINE 10 MG: 5 TABLET, FILM COATED ORAL at 12:49

## 2022-08-21 RX ADMIN — HYDROXYZINE HYDROCHLORIDE 25 MG: 25 TABLET ORAL at 20:58

## 2022-08-21 RX ADMIN — LIDOCAINE PATCH 4% 1 PATCH: 40 PATCH TOPICAL at 10:29

## 2022-08-21 RX ADMIN — OLANZAPINE 10 MG: 10 TABLET, FILM COATED ORAL at 20:58

## 2022-08-21 RX ADMIN — MULTIPLE VITAMINS W/ MINERALS TAB 1 TABLET: TAB at 10:29

## 2022-08-21 RX ADMIN — QUETIAPINE FUMARATE 125 MG: 100 TABLET ORAL at 20:59

## 2022-08-21 RX ADMIN — BUPRENORPHINE AND NALOXONE 1 FILM: 4; 1 FILM, SOLUBLE BUCCAL; SUBLINGUAL at 10:29

## 2022-08-21 RX ADMIN — MELATONIN TAB 3 MG 3 MG: 3 TAB at 20:59

## 2022-08-21 RX ADMIN — CETIRIZINE HYDROCHLORIDE 10 MG: 10 TABLET, FILM COATED ORAL at 10:29

## 2022-08-21 RX ADMIN — NICOTINE POLACRILEX 4 MG: 4 GUM, CHEWING BUCCAL at 20:58

## 2022-08-21 RX ADMIN — ACETAMINOPHEN 975 MG: 325 TABLET, FILM COATED ORAL at 16:13

## 2022-08-21 RX ADMIN — NICOTINE POLACRILEX 4 MG: 4 GUM, CHEWING BUCCAL at 16:13

## 2022-08-21 RX ADMIN — NAPROXEN 250 MG: 250 TABLET ORAL at 21:44

## 2022-08-21 RX ADMIN — DESVENLAFAXINE SUCCINATE 50 MG: 50 TABLET, EXTENDED RELEASE ORAL at 10:29

## 2022-08-21 ASSESSMENT — ACTIVITIES OF DAILY LIVING (ADL)
ADLS_ACUITY_SCORE: 29
DRESS: INDEPENDENT
ADLS_ACUITY_SCORE: 29
ADLS_ACUITY_SCORE: 29
LAUNDRY: WITH SUPERVISION
ADLS_ACUITY_SCORE: 29
HYGIENE/GROOMING: INDEPENDENT
ORAL_HYGIENE: INDEPENDENT
ADLS_ACUITY_SCORE: 29

## 2022-08-21 NOTE — PLAN OF CARE
"Pt slept in late again and ate breakfast late. He appeared somewhat groggy/drowsy while eating breakfast. He stated he slept \"ok.\" He has been more withdrawn and isolative than usual throughout the shift today; however, he has been calm and pleasant in his interactions with staff. He requested and received PRN Naproxen ongoing right shoulder and right bicep pain. He also received PRN Zyprexa (10mg) for \"agitation.\" Both were reportedly effective. No safety concerns at this time.     Problem: Behavioral Health Plan of Care  Goal: Plan of Care Review  Recent Flowsheet Documentation  Taken 8/21/2022 1336 by Marci Almanza  Plan of Care Reviewed With: patient  Patient Agreement with Plan of Care: agrees     " Health Maintenance Summary     Topic Due On Due Status Completed On Postpone Until Reason    MAMMOGRAM - BREAST CANCER SCREENING Sep 18, 2016 Postponed Sep 18, 2015 Oct 10, 2017 Patient Refused    Pap Smear - Cervical Cancer Screening  May 16, 2022 Not Due May 16, 2017      Immunization - TDAP Pregnancy  Hidden       IMMUNIZATION - DTaP/Tdap/Td Oct 21, 2024 Not Due Oct 21, 2014      Immunization-Influenza Sep 1, 2017 Postponed  Apr 1, 2018 Patient Refused          Patient is due for topics as listed above, she wishes to decline at this time .

## 2022-08-21 NOTE — PLAN OF CARE
"Problem: Behavioral Health Plan of Care  Goal: Develops/Participates in Therapeutic [place] to Support Successful Transition  Outcome: Ongoing, Progressing     Patient is alert and oriented, calm and cooperative. Patient visible in the milieu at the beginning of the shift and he became withdrawn and isolative to his room after dinner. Patient took a nap and woke up for snacks and HS medications. When present in milieu patient was social and interactive with peers. Patient didn't attend in group this evening. Patient has a fair affect, normal speech and some insight. Patient appears neat and well-groomed. Patient voices his needs appropriately. Patient endorses depression 6/10 and anxiety 7/10. Patient denied having  SI/SIB/HI, auditory hallucinations, and thoughts of paranoia. Patient is eating, hydrating, and sleeping adequately.     Patient is medication compliant, doesn't need reminders. Medication side effects were not observed or reported this shift. Patient c/o pain on his right arm and received Naproxen 250 mg at the beginning of the shift and reported some relief. Patient denies any other physical discomfort. No acute medical concern. VSS /80   Pulse 77   Temp 98.7  F (37.1  C) (Oral)   Resp 18   Ht 1.803 m (5' 11\")   Wt 100.8 kg (222 lb 4.8 oz)   SpO2 95%   BMI 31.00 kg/m      "

## 2022-08-21 NOTE — PLAN OF CARE
Problem: Sleep Disturbance  Goal: Adequate Sleep/Rest  Outcome: Ongoing, Progressing   Goal Outcome Evaluation:  Patient endorsed no new concerns for the night. Appeared to be sleeping well during rounds. Slept for approximately 7 hours. No requests for prn's.

## 2022-08-22 PROCEDURE — 250N000013 HC RX MED GY IP 250 OP 250 PS 637: Performed by: PSYCHIATRY & NEUROLOGY

## 2022-08-22 PROCEDURE — 250N000013 HC RX MED GY IP 250 OP 250 PS 637

## 2022-08-22 PROCEDURE — 124N000002 HC R&B MH UMMC

## 2022-08-22 PROCEDURE — 250N000013 HC RX MED GY IP 250 OP 250 PS 637: Performed by: HOSPITALIST

## 2022-08-22 PROCEDURE — 99232 SBSQ HOSP IP/OBS MODERATE 35: CPT | Mod: GC | Performed by: PSYCHIATRY & NEUROLOGY

## 2022-08-22 PROCEDURE — H2032 ACTIVITY THERAPY, PER 15 MIN: HCPCS

## 2022-08-22 PROCEDURE — G0177 OPPS/PHP; TRAIN & EDUC SERV: HCPCS

## 2022-08-22 RX ORDER — BUPRENORPHINE AND NALOXONE 4; 1 MG/1; MG/1
1 FILM, SOLUBLE BUCCAL; SUBLINGUAL 2 TIMES DAILY
Status: COMPLETED | OUTPATIENT
Start: 2022-08-22 | End: 2022-08-22

## 2022-08-22 RX ORDER — BUPRENORPHINE AND NALOXONE 8; 2 MG/1; MG/1
1 FILM, SOLUBLE BUCCAL; SUBLINGUAL 2 TIMES DAILY
Status: DISCONTINUED | OUTPATIENT
Start: 2022-08-23 | End: 2022-08-29 | Stop reason: HOSPADM

## 2022-08-22 RX ORDER — BUPRENORPHINE AND NALOXONE 8; 2 MG/1; MG/1
1 FILM, SOLUBLE BUCCAL; SUBLINGUAL DAILY
Status: COMPLETED | OUTPATIENT
Start: 2022-08-22 | End: 2022-08-22

## 2022-08-22 RX ADMIN — QUETIAPINE FUMARATE 150 MG: 100 TABLET ORAL at 20:42

## 2022-08-22 RX ADMIN — NICOTINE POLACRILEX 4 MG: 4 GUM, CHEWING BUCCAL at 10:39

## 2022-08-22 RX ADMIN — BUPRENORPHINE AND NALOXONE 1 FILM: 4; 1 FILM, SOLUBLE BUCCAL; SUBLINGUAL at 13:30

## 2022-08-22 RX ADMIN — NAPROXEN 250 MG: 250 TABLET ORAL at 18:27

## 2022-08-22 RX ADMIN — DESVENLAFAXINE SUCCINATE 50 MG: 50 TABLET, EXTENDED RELEASE ORAL at 09:27

## 2022-08-22 RX ADMIN — CETIRIZINE HYDROCHLORIDE 10 MG: 10 TABLET, FILM COATED ORAL at 09:28

## 2022-08-22 RX ADMIN — NICOTINE POLACRILEX 4 MG: 4 GUM, CHEWING BUCCAL at 16:55

## 2022-08-22 RX ADMIN — ACETAMINOPHEN 975 MG: 325 TABLET, FILM COATED ORAL at 10:39

## 2022-08-22 RX ADMIN — POLYETHYLENE GLYCOL 3350 17 G: 17 POWDER, FOR SOLUTION ORAL at 10:41

## 2022-08-22 RX ADMIN — BUPRENORPHINE AND NALOXONE 1 FILM: 4; 1 FILM, SOLUBLE BUCCAL; SUBLINGUAL at 09:28

## 2022-08-22 RX ADMIN — LIDOCAINE PATCH 4% 1 PATCH: 40 PATCH TOPICAL at 09:27

## 2022-08-22 RX ADMIN — MULTIPLE VITAMINS W/ MINERALS TAB 1 TABLET: TAB at 09:28

## 2022-08-22 RX ADMIN — NICOTINE POLACRILEX 4 MG: 4 GUM, CHEWING BUCCAL at 19:27

## 2022-08-22 RX ADMIN — PRAZOSIN HYDROCHLORIDE 1 MG: 1 CAPSULE ORAL at 20:42

## 2022-08-22 RX ADMIN — BUPRENORPHINE AND NALOXONE 1 FILM: 8; 2 FILM BUCCAL; SUBLINGUAL at 19:01

## 2022-08-22 RX ADMIN — MELATONIN TAB 3 MG 3 MG: 3 TAB at 20:43

## 2022-08-22 RX ADMIN — NICOTINE POLACRILEX 4 MG: 4 GUM, CHEWING BUCCAL at 18:27

## 2022-08-22 RX ADMIN — NICOTINE POLACRILEX 4 MG: 4 GUM, CHEWING BUCCAL at 12:07

## 2022-08-22 RX ADMIN — NICOTINE POLACRILEX 4 MG: 4 GUM, CHEWING BUCCAL at 20:43

## 2022-08-22 RX ADMIN — ACETAMINOPHEN 975 MG: 325 TABLET, FILM COATED ORAL at 17:00

## 2022-08-22 RX ADMIN — ALBUTEROL SULFATE 2 PUFF: 90 AEROSOL, METERED RESPIRATORY (INHALATION) at 20:41

## 2022-08-22 RX ADMIN — OLANZAPINE 10 MG: 10 TABLET, FILM COATED ORAL at 20:42

## 2022-08-22 ASSESSMENT — ACTIVITIES OF DAILY LIVING (ADL)
ADLS_ACUITY_SCORE: 29
HYGIENE/GROOMING: HANDWASHING;INDEPENDENT
ADLS_ACUITY_SCORE: 29
ORAL_HYGIENE: INDEPENDENT
ORAL_HYGIENE: INDEPENDENT
ADLS_ACUITY_SCORE: 29
HYGIENE/GROOMING: HANDWASHING;SHOWER;INDEPENDENT
ADLS_ACUITY_SCORE: 29
DRESS: STREET CLOTHES;INDEPENDENT
LAUNDRY: WITH SUPERVISION
ADLS_ACUITY_SCORE: 29

## 2022-08-22 NOTE — PLAN OF CARE
Occupational Therapy Group Note:     08/22/22 1200   Group Therapy Session   Group Attendance attended group session   Time Session Began 1115   Time Session Ended 1215   Total Time patient participated (minutes) 45   Total # Attendees 6-7   Group Type expressive therapy   Group Topic Covered coping skills/lifestyle management   Group Session Detail OT clinic   Patient Response/Contribution cooperative with task;organized   Patient Response Detail Pt actively participated in occupational therapy clinic to facilitate coping skill exploration, creative expression within personally meaningful activities, and clinical observation of social, cognitive, and kinesthetic performance skills. Pt response: Independent to initiate, gather materials, sequence, and adjust to workspace demands as needed. Demonstrated good focus, planning, and attention to detail for selected creative expression task. Able to ask for assistance as needed, and socialized with peers and staff. Joked with peers and writer throughout, and his affect appeared to brighten in interactions.

## 2022-08-22 NOTE — PLAN OF CARE
Problem: Plan of Care - These are the overarching goals to be used throughout the patient stay.    Goal: Optimal Comfort and Wellbeing  Intervention: Monitor Pain and Promote Comfort  Recent Flowsheet Documentation  Taken 8/22/2022 1700 by Ede Mcnair, RN  Pain Management Interventions: medication (see MAR)     Problem: Behavioral Health Plan of Care  Goal: Plan of Care Review  Recent Flowsheet Documentation  Taken 8/22/2022 1700 by Ede Mcnair, RN  Plan of Care Reviewed With: patient  Patient Agreement with Plan of Care: agrees     Problem: Suicidal Behavior  Goal: Suicidal Behavior is Absent or Managed  Outcome: Ongoing, Progressing  Flowsheets (Taken 8/22/2022 1750)  Mutually Determined Action Steps (Suicidal Behavior Absent/Managed): sets future-oriented goal   Goal Outcome Evaluation:    Plan of Care Reviewed With: patient     Pleasant, cooperative and compliant with medications. Right upper arm pain rated at 5, PRN Tylenol given at 1700 and naproxen at 1830. Patient reported no anxiety or depression, no SI/HI, contracted for safety. Patient visible in milieu, social and interactive with peers and staff members.

## 2022-08-22 NOTE — PLAN OF CARE
Assessment/Intervention/Current Symtoms and Care Coordination  The patient's care was discussed with the treatment team and chart notes were reviewed  Patient met with team.  No changes in past few days.  Patient has been attending groups, compliant with meds, increasing suboxone per MD's     Received confirmation from Kiet Browne that patient has been found eligible for the behavioral health fund for CD treatment funding. Awaiting word back where referrals can be made.  Once recommendations have been made, referrals will be sent.      Patient called [facility] telling them that he wants to go to their program.  James the director - Phone; [phone] called to discuss the patient. Returned call and left .       Discharge Plan or Goal  Residential MI/CD treatment  Due to long wait time at [facility] - supervisor is now agreeable to a possible residential outpatient program vs CARE.      Barriers to Discharge   MI/CD Placement     Referral Status  None today     Legal Status     Civil commitment with Das order  Patient will require provisional discharge

## 2022-08-22 NOTE — PLAN OF CARE
"Problem: Behavioral Health Plan of Care  Goal: Develops/Participates in Therapeutic [place] to Support Successful Transition  Outcome: Ongoing, Progressing  Intervention: Foster Therapeutic [place]  Flowsheets (Taken 8/21/2022 1910)  Trust Relationship/Rapport:    emotional support provided    empathic listening provided    thoughts/feelings acknowledged    reassurance provided  Patient had good evening and was calm and cooperative. Patient is present in the milieu appearing interactive and social with peers and staff. Upon approach he is bright, has \"good\" mood and stated that he is feeling less anxious and depressed. He added that his depression is 2/2 to \"feeling stack\" in hospital and not be able to work or go anywhere. Patient denies auditory hallucinations, paranoia, thoughts of harming self/others. Patient attended and participated in therapy group this evening. Patient is eating, hydrating and sleeping adequately.    Patient is medication compliant with no reminders. No med side effects noted/reported. Patient continues to endorse pain on his RUE. He received tylenol 975 mg prn this shift. Patient denies any other physicial discomfort. No acute medical concern. VSS /80   Pulse 81   Temp 97.8  F (36.6  C) (Oral)   Resp 16   Ht 1.803 m (5' 11\")   Wt 102.7 kg (226 lb 8 oz)   SpO2 96%   BMI 31.59 kg/m      "

## 2022-08-22 NOTE — PROGRESS NOTES
"    ----------------------------------------------------------------------------------------------------------  [facility]  Psychiatric Progress Note  Hospital Day #37    See Mendenhall MRN# [id number]   Age: 31 year old YOB: 1990   Date of Admission: 7/14/2022     Subjective   The patient was discussed with the treatment team and chart notes were reviewed.      Identifier: 31 year old male with ADAM     Sleep:  6 hours (08/22/22 0600)   Prescribed Medications: Taken as prescribed  PRN Psychiatric Medications: acetaminophen, albuterol, alum & mag hydroxide-simethicone, hydrocortisone, hydrOXYzine, lidocaine 4%, nicotine, OLANZapine, QUEtiapine    Overnight Nursing Notes/Staff Report:  Patient had good evening and was calm and cooperative. Patient is present in the milieu appearing interactive and social with peers and staff. Upon approach he is bright, has \"good\" mood and stated that he is feeling less anxious and depressed. He added that his depression is 2/2 to \"feeling stack\" in hospital and not be able to work or go anywhere. Patient denies auditory hallucinations, paranoia, thoughts of harming self/others. Patient attended and participated in therapy group this evening. Patient is eating, hydrating and sleeping adequately    Patient interview:  Pt was seen in the interview room.  Pt says it was not bad over the weekend lawson that he was able to reschedule his surgery date to sept 19th.  PT says overall he is doing good.  He says his zyprexa is very helpful to help him stay calm.  PT also requested his seroquel increased as well as his suboxone.     -------------------------------------------------------------------------------------------------------------------------  Suicidal ideation: denies current or recent suicidal ideation or behaviors.  Homicidal ideation: denies current or recent homicidal ideation or behaviors.  Psychotic symptoms: Patient denies AH, VH, " "paranoia, delusions.     Medication side effects reported: No significant side effects.  Acute medical concerns: none     ROS   ROS was negative unless noted above.     Objective   Vitals:  Temp: 97.8  F (36.6  C) (Temp  Min: 97.8  F (36.6  C)  Max: 97.8  F (36.6  C))    (No data recorded)  SpO2: 96 % (SpO2  Min: 96 %  Max: 96 %)  Pulse: 81 (Pulse  Min: 81  Max: 81)  BP: 132/85  Systolic (24hrs), Av , Min:132 , Max:133     Diastolic (24hrs), Av, Min:80, Max:85      Mental Status Examination:  Oriented to:  Person/Self, Situation, Date and location  General: Awake and Alert  Appearance:  Appears stated age  Behavior:  calm, cooperative and engaged  Eye Contact:  adequate  Psychomotor:  no abnormal motor symptoms appreciated; no catatonia present  Speech:  appropriate volume/tone, talkative and with good articulation  Language: Fluent in English with appropriate syntax and vocabulary.  Mood:  \"doing okay\"  Affect:  appropriate, congruent with mood and broad/full  Thought Process:  linear, goal directed and perseveration  Thought Content: No SI/HI/AH/VH; No apparent delusions  Associations:  intact  Insight:  fair due to recognizing need to go to CD  Judgment:  fair due to willingness to engage with treatment and go to CD  Attention Span: adequate for conversation  Concentration:  grossly intact  Recent and Remote Memory:  not formally assessed  Fund of Knowledge: average     Allergies     Allergies   Allergen Reactions     Other Drug Allergy (See Comments)      Fake metal        Labs & Imaging     No results found for this or any previous visit (from the past 24 hour(s)).       Assessment   Diagnostic Impression:  See Mendenhall is a 31 year old gentleman with a past psychiatric diagnosis of  Bipolar 2 disorder, substance use disorder, depressive disorder and anxiety disorder, PTSD and ADHD. He was admitted from the ER on 2022 where he was brought by the police due to concern for psychosis and SI with " plan. This in the context of methamphetamine and cocaine use, unclear regarding medication non-adherence, he has significant history of substance use and psychosocial stressors including housing instability, unemployment, legal issues, trauma and chronic mental health issues.     He was brought to the ED with SI, erratic and impulsive behaviors and psychosis (paranoid delusions and disorganized behavior/thought process) in the context of methamphetamine and cocaine use.  His last psychiatric hospitalization was in April/2022 at [facility] for psychosis in context of substance use.  He is currently followed by PCP Erinn Maradiaga at [facility]. Current psychosocial stressors include legal issues, trauma, chronic mental health issues, family dynamics and medical issues which he has been coping with by using substances, acting out to self and acting out to others.  Patient's support system includes sister Samaria and friend Wilbert.      Substance use does appear to be playing a contributing role in the patient's presentation. Medical history does not appear to be significant, although chart review indicates history of chronic back pain, prescribed anabolic steroids that may be playing a role in presentation. In utero exposure and developmental delay need to be assessed.       Psychosocially history of trauma in childhood, long history of substance use, currently being unemployed and experiencing housing insecurity, legal issues, not being able to see his daughter represent both precipitating and perpetuating factors contributing to presentation.      The MSE is notable for some persisting paranoia, mood lability, being guarded, tangentiality and limited attention span/concentration, hyper-talkativeness difficult to interrupt speech. He denies self injurious behaviors. His reported symptoms of VH, paranoid delusions and grossly disorganized thought process in the context of methamphetamine and cocaine  use are suggestive of substance-induced psychosis, although definitive diagnosis is still in evolution and further collateral information from family/ outpatient provider is needed at this time; differential includes primary psychotic disorder exacerbated by substance use, PTSD, Bipolar Disorder, schizoaffective disorder.  Additionally, he has traits of impulsive behaviors which interfere with his ability to adhere with the treatment plan.       Optimization of medications to target these symptom clusters in addition to evaluation of adequate community supports will be targets for treatment during this admission.      Pt has history of previous attempts and substance use representing heightened acute risk for self/others patient warrants inpatient psychiatric hospitalization to maintain his safety until door to door treatment can be arranged. Disposition pending clinical stabilization, medication optimization and development of an appropriate discharge plan, including CD treatment referral upon discharge.     Principal Diagnoses:   Substance use disorder (stimuland, opioid, sedative hypnotic)    Secondary Diagnoses:   Substance induced psychosis, now resolved  Major depressive disorder, recurrent episode, moderate   Generalized anxiety disorder    Psychiatric & Medical course:  See Mendenhall was admitted to [facility] on a 72 hour hold, transferred to [facility] following a conflict with another patient. Patient in question has been removed from [facility], so now Mr. Mendenhall has returned to [facility]. His PTA prazosin was continued.      See Mendenhall continued to meet criteria for inpatient hospitalization medication optimization, inpatient stabilization, and appropriate discharge planning.      7/19: added Seroquel 50 mg BEDTIME; 25 mg TID PRN   7/20: Quetiapine (Seroquel) increased to 100 mg, BEDTIME scheduled. Keep 25 mg TID PRN  7/20: melatonin 3mg scheduled per patient request  7/25: Pending  confirmation of CARE referral and status on list with outpatient CM. If anticipated to be a prolonged wait for placement, will consider less restrictive options for CD.  7/26: Ongoing issues with outpatient CM Agatha not returning messages or answering calls from team or patient. Message left with CM supervisor given persistent issues reaching CM.  7/27: OP CM strongly advocating for CARE placement given patient's history of several failed treatments and violent/aggressive behaviors in context of substance use. OP CM did confirm that she placed CARE referral. Pt continues to voice frustration about length of stay and delay in CARE referral as he was informed that the referral was placed over one week ago.   7/28: Returned to [facility] from [facility]. CARE placement planned, likely will be available within two weeks. Mr. Mendenhall requests audience with  (Agatha) to explain circumstances of relapse and admission, even if it will not change the course of treatment or discharge to CARE facility, still wishes to clarify details. Changed scheduled SEROQUEL to ZYPREXA 10 mg at bedtime. SEROQUEL ordered PRN  7/29: CARE placement planned, but Mr. Mendenhall continues to request audience with  (Agatha). Should have been placed on list for CARE facility previously as he is on hospital day 13. Will ensure this is done today.  scheduled to visit unit and speak with Mr. Mendenhall on 8/1. Persistent anxiety, request for valium denied due to CD history and need to maintain CARE eligibility. Increased PRN ZYPREXA to accommodate maximum total dose of 40 mg/day (along with scheduled). Complains of shoulder pain s/p rotator cuff surgery and biceps tear in 12/2021, request for tramadol denied due to CD history. Ordered PRN naproxen and PRN lidocaine cream instead.  8/1: Seasonal allergies; ordered PRN Cetirizine 10 mg   8/2: patient requested testosterone supplementation; ordered serum testosterone and  "SHBG  8/3: Added desvenlafaxine 25 mg daily and added PRN melatonin 3 mg to existing scheduled melatonin 3 mg QPM; Testosterone labs pending.  8/8: Testosterone  & Sex Hormone Binding Globulin levels within normal range; results shared with patient.  8/10: increased desvenlafaxine to 50 mg daily, changed lidocaine gel to patch for biceps pain  8/12: IPCD consult for suboxone and Code 3 exception : \"Please consider exception to patient activities off of secure unit policy (Code 3). Patient would be escorted by security.\"  8/15: Increased PRN Tylenol and Naproxen  8/17: Started suboxone 2-0.5 mg today for two doses; increase according to Dr. Roldan's consult note         Plan   Today's Changes (PRN medications):     Increased Suboxone to 8-2 mg per 1 film    Increased Seroquel to 150 mg  _______________________________________________________________________  Psychiatric Management:    Prazosin 1mg at bedtime    Civil commitment with Das    Additional Planning:    Continue to monitor for and stabilize: irritable, psychosis and substance use    Patient will be treated in therapeutic milieu with appropriate individual and group therapies as described.    Scheduled Medications (summary):  Current Facility-Administered Medications   Medication Dose Route Frequency     buprenorphine HCl-naloxone HCl  1 Film Sublingual BID     cetirizine  10 mg Oral Daily     desvenlafaxine  50 mg Oral Daily     lidocaine  1 patch Transdermal Q24H     lidocaine   Transdermal Q8H ALBA     melatonin  3 mg Oral At Bedtime     multivitamin w/minerals  1 tablet Oral Daily     OLANZapine  10 mg Oral At Bedtime     polyethylene glycol  17 g Oral Daily     prazosin  1 mg Oral At Bedtime     PRN Medications (summary):  Current Facility-Administered Medications   Medication Dose Route Frequency     acetaminophen  975 mg Oral Q6H PRN     albuterol  2 puff Inhalation Q6H PRN     alum & mag hydroxide-simethicone  30 mL Oral Q4H PRN     " diphenhydrAMINE  50 mg Oral Once PRN    Or     diphenhydrAMINE  50 mg IV/IM Once PRN     EPINEPHrine  0.3 mg Intramuscular Q5 Min PRN     hydrocortisone   Topical BID PRN     hydrOXYzine  25 mg Oral Q4H PRN     melatonin  3 mg Oral At Bedtime PRN     naproxen  250 mg Oral 4x Daily PRN     nicotine  4 mg Buccal Q1H PRN     OLANZapine  5-10 mg Oral TID PRN    Or     OLANZapine  10 mg Intramuscular TID PRN     QUEtiapine  125 mg Oral At Bedtime PRN     QUEtiapine  25-50 mg Oral TID PRN     Consult:    Dr. Seo, from Medicine curbsided regarding patient's abnormal EKGs. As per Medicine, since the patient is asymptomatic, with normal vitals, only with five higher than normal BP readings from twenty-three, and regular heart rate and rhythm, it seems that EKG may be demonstrating more of a chronic condition. It is recommended for patient to follow-up with PCP and/or Cardiology once discharged. No urgency at this point.    Legal Status:    Das    Committed     SIO:    No    Pt has not required locked seclusion or restraints in the past 24 hours to maintain safety, please refer to RN documentation for further details.    Safety Assessment:   Behavioral Orders   Procedures     Assault precautions     Code 1 - Restrict to Unit     Code 3     Routine Programming     As clinically indicated     Status 15     Every 15 minutes.     Disposition:    Reason for ongoing admission:  Civil commitment pending placement for CD for direct transfer    Discharge location: D, pending clinical stabilization, medication optimization, and formulation of a safe discharge plan. Likely CARE, possibly within the next 14 days.      Discharge Medications: not ordered    Follow-up Appointments: not scheduled  ____________________________________________________________________  Pertinent Medical diagnoses and management:    None  ____________________________________________________________________      Theresa Reese MD  PGY1 Psychiatry  Resident      Attestation:  This patient has been seen and evaluated by me, Galina De Jesus MD.  I have discussed this patient with the house staff team including the resident and medical student and I agree with the findings and plan in this note.    I have reviewed today's vital signs, medications, labs and imaging. Galina De Jesus MD , PhD.

## 2022-08-22 NOTE — PLAN OF CARE
Patient appeared to be asleep through the night, he slept for 6 hrs. No request for PRN medication this shift.      Goal Outcome Evaluation:    Problem: Sleep Disturbance  Goal: Adequate Sleep/Rest  Outcome: Ongoing, Progressing

## 2022-08-22 NOTE — PROGRESS NOTES
08/22/22 1500   Group Therapy Session   Time Session Began 1410   Time Session Ended 1450   Total Time patient participated (minutes) 40   Total # Attendees 3-6   Group Type expressive therapy   Group Topic Covered relaxation techniques   Group Session Detail Afternoon Relaxation   Patient Response/Contribution cooperative with task   Patient Response Detail Cooperatively engaged in Afternoon Music Relaxation group to decrease anxiety and promote calm.      Appropriately engaged in session, responding well to the music.   See's affect was on the brighter, more exubherant side, but able to be easily contained with the intervention.

## 2022-08-22 NOTE — PLAN OF CARE
Goal Outcome Evaluation:     Plan of Care Reviewed With: patient     Patient calm, cooperative and med complaint. Visible in milieu most of the shift for meals, watching TV, community meeting and group therapy. Adequate foods and fluids, adequate groomed. PRN's Tylenol, nicotine gum given. Pt assault precautions, no behaviors observed or medical concerns at this time. Pt contracts for safety. Pt  Is appropriate with peers and staff. Pt demonstrates ability to communicate needs to staff.    NURSING ASSESSMENT  Pain: yes, 7/10, chronic right hand   Anxiety: denies  Depression: denies  SI: denies  SIB: denies  HI: denies  AVH: denies, did not appear to be responding to internal stimuli, did not demonstrate delusional thinking.  Mood/Affect: Flat  Medication: compliant, no side effects reported     Problem: Plan of Care - These are the overarching goals to be used throughout the patient stay.    Goal: Plan of Care Review/Shift Note  Description: The Plan of Care Review/Shift note should be completed every shift.  The Outcome Evaluation is a brief statement about your assessment that the patient is improving, declining, or no change.  This information will be displayed automatically on your shift note.  Outcome: Ongoing, Progressing  Flowsheets (Taken 8/22/2022 1400)  Plan of Care Reviewed With: patient   Goal Outcome Evaluation:    Plan of Care Reviewed With: patient

## 2022-08-23 PROCEDURE — 250N000013 HC RX MED GY IP 250 OP 250 PS 637

## 2022-08-23 PROCEDURE — G0177 OPPS/PHP; TRAIN & EDUC SERV: HCPCS

## 2022-08-23 PROCEDURE — 250N000013 HC RX MED GY IP 250 OP 250 PS 637: Performed by: PSYCHIATRY & NEUROLOGY

## 2022-08-23 PROCEDURE — 99232 SBSQ HOSP IP/OBS MODERATE 35: CPT | Mod: GC | Performed by: PSYCHIATRY & NEUROLOGY

## 2022-08-23 PROCEDURE — 124N000002 HC R&B MH UMMC

## 2022-08-23 RX ADMIN — MELATONIN TAB 3 MG 3 MG: 3 TAB at 21:24

## 2022-08-23 RX ADMIN — QUETIAPINE FUMARATE 150 MG: 100 TABLET ORAL at 21:16

## 2022-08-23 RX ADMIN — ACETAMINOPHEN 975 MG: 325 TABLET, FILM COATED ORAL at 11:09

## 2022-08-23 RX ADMIN — BUPRENORPHINE AND NALOXONE 1 FILM: 8; 2 FILM BUCCAL; SUBLINGUAL at 08:55

## 2022-08-23 RX ADMIN — BUPRENORPHINE AND NALOXONE 1 FILM: 8; 2 FILM BUCCAL; SUBLINGUAL at 19:02

## 2022-08-23 RX ADMIN — PRAZOSIN HYDROCHLORIDE 1 MG: 1 CAPSULE ORAL at 21:14

## 2022-08-23 RX ADMIN — CETIRIZINE HYDROCHLORIDE 10 MG: 10 TABLET, FILM COATED ORAL at 08:55

## 2022-08-23 RX ADMIN — NICOTINE POLACRILEX 4 MG: 4 GUM, CHEWING BUCCAL at 21:17

## 2022-08-23 RX ADMIN — NICOTINE POLACRILEX 4 MG: 4 GUM, CHEWING BUCCAL at 18:42

## 2022-08-23 RX ADMIN — OLANZAPINE 10 MG: 5 TABLET, FILM COATED ORAL at 13:15

## 2022-08-23 RX ADMIN — LIDOCAINE PATCH 4% 1 PATCH: 40 PATCH TOPICAL at 10:15

## 2022-08-23 RX ADMIN — NICOTINE POLACRILEX 4 MG: 4 GUM, CHEWING BUCCAL at 11:09

## 2022-08-23 RX ADMIN — NAPROXEN 250 MG: 250 TABLET ORAL at 21:44

## 2022-08-23 RX ADMIN — NICOTINE POLACRILEX 4 MG: 4 GUM, CHEWING BUCCAL at 12:21

## 2022-08-23 RX ADMIN — DESVENLAFAXINE SUCCINATE 50 MG: 50 TABLET, EXTENDED RELEASE ORAL at 08:55

## 2022-08-23 RX ADMIN — POLYETHYLENE GLYCOL 3350 17 G: 17 POWDER, FOR SOLUTION ORAL at 08:55

## 2022-08-23 RX ADMIN — MULTIPLE VITAMINS W/ MINERALS TAB 1 TABLET: TAB at 08:55

## 2022-08-23 RX ADMIN — NICOTINE POLACRILEX 4 MG: 4 GUM, CHEWING BUCCAL at 08:59

## 2022-08-23 RX ADMIN — OLANZAPINE 10 MG: 10 TABLET, FILM COATED ORAL at 21:13

## 2022-08-23 ASSESSMENT — ACTIVITIES OF DAILY LIVING (ADL)
ADLS_ACUITY_SCORE: 29
LAUNDRY: UNABLE TO COMPLETE
HYGIENE/GROOMING: INDEPENDENT
ADLS_ACUITY_SCORE: 29
ORAL_HYGIENE: INDEPENDENT
ORAL_HYGIENE: INDEPENDENT
DRESS: INDEPENDENT
DRESS: INDEPENDENT
ADLS_ACUITY_SCORE: 29
HYGIENE/GROOMING: HANDWASHING;INDEPENDENT
ADLS_ACUITY_SCORE: 29

## 2022-08-23 NOTE — PLAN OF CARE
"[facility] Occupational Therapy Group Intervention Note     08/23/22 1104   Group Therapy Session   Group Attendance attended group session   Time Session Began 1015   Time Session Ended 1100   Total Time patient participated (minutes) 40   Total # Attendees 6   Group Type task skill   Group Topic Covered relaxation techniques;self-care activities;coping skills/lifestyle management   Group Session Detail Chair Yoga. Purpose: Occupation-based coping skill exploration group through mindful movement to facilitate stress management, awakening and/or relaxation. Education was provided on the use of stretching, exercise, and meditation practice as a coping tool within daily/weekly routine.    Patient Response/Contribution cooperative with task;organized;discussed personal experience with topic   Patient Response Detail Independently followed and engaged in all of group. Verbalized feeling \"better\" at the end of group; particularly enjoyed the breathing techniques.      Opal Jaeger OT on 8/23/2022 at 11:05 AM      "

## 2022-08-23 NOTE — PLAN OF CARE
Pt was visible in the milieu. Pleasant, cooperative and medication compliant. Pt denied SI/SIB/Hallucinations . Complained of R arm pain . Rated his anxiety/depression both at 5/10, saying I just want to go to treatment.  Pt was happy to receive his insurance cards today.  Pt was able to attend and participate in groups.  He met his other goals of making calls for today.     Later the shift, pt was requested for Zyprexa for his racing thoughts.  No behavioral concerns at this time.     Problem: Behavioral Health Plan of Care  Goal: Patient-Specific Goal (Individualization)  Outcome: Ongoing, Progressing  Flowsheets (Taken 8/23/2022 1546)  Patient Personal Strengths:   expressive of emotions   expressive of needs     Problem: Behavioral Health Plan of Care  Goal: Adheres to Safety Considerations for Self and Others  Outcome: Ongoing, Progressing  Intervention: Develop and Maintain Individualized Safety Plan  Recent Flowsheet Documentation  Taken 8/23/2022 1500 by Fallon Dee RN  Safety Measures: safety rounds completed     Problem: Suicidal Behavior  Goal: Suicidal Behavior is Absent or Managed  Outcome: Ongoing, Progressing   Goal Outcome Evaluation:

## 2022-08-23 NOTE — PROGRESS NOTES
"    ----------------------------------------------------------------------------------------------------------  [facility]  Psychiatric Progress Note  Hospital Day #38    See Mendenhall MRN# [id number]   Age: 31 year old YOB: 1990   Date of Admission: 7/14/2022     Subjective   The patient was discussed with the treatment team and chart notes were reviewed.      Identifier: 31 year old male with ADAM     Sleep:  7 hours (08/23/22 0600)   Prescribed Medications: Taken as prescribed  PRN Psychiatric Medications: acetaminophen, albuterol, alum & mag hydroxide-simethicone, hydrocortisone, hydrOXYzine, lidocaine 4%, nicotine, OLANZapine, QUEtiapine    Overnight Nursing Notes/Staff Report:  \"Pleasant, cooperative and compliant with medications. Right upper arm pain rated at 5, PRN Tylenol given at 1700 and naproxen at 1830. Patient reported no anxiety or depression, no SI/HI, contracted for safety. Patient visible in milieu, social and interactive with peers and staff members.\"    Patient interview:  Patient reports that good things are coming his way. He feels like his depression and anxiety are getting better. He has heard back from the IRTS. He has been using the lidocaine patch, which works for his biceps pain. Patient reports that he wants to go up on his Buprenorphine, but the team mentions that the addiction team has set up a plan and will continue in that direcction. Patient reports that everything else is fine.   -------------------------------------------------------------------------------------------------------------------------  Suicidal ideation: denies current or recent suicidal ideation or behaviors.  Homicidal ideation: denies current or recent homicidal ideation or behaviors.  Psychotic symptoms: Patient denies AH, VH, paranoia, delusions.     Medication side effects reported: No significant side effects.  Acute medical concerns: none     ROS   ROS was " "negative unless noted above.     Objective   Vitals:  Temp: 98.7  F (37.1  C) (Temp  Min: 98.7  F (37.1  C)  Max: 98.7  F (37.1  C))  Resp: 16 (Resp  Min: 16  Max: 16)  SpO2: 93 % (SpO2  Min: 93 %  Max: 93 %)  Pulse: 97 (Pulse  Min: 97  Max: 97)  BP: (!) 142/91  Systolic (24hrs), Av , Min:136 , Max:142     Diastolic (24hrs), Av, Min:88, Max:91      Mental Status Examination:  Oriented to:  Person/Self, Situation, Date and location  General: Awake and Alert  Appearance:  Appears stated age  Behavior:  calm, cooperative and engaged  Eye Contact:  adequate  Psychomotor:  no abnormal motor symptoms appreciated; no catatonia present  Speech:  appropriate volume/tone, talkative and with good articulation  Language: Fluent in English with appropriate syntax and vocabulary.  Mood:  \"doing okay\"  Affect:  appropriate, congruent with mood and broad/full  Thought Process:  linear, goal directed and perseveration  Thought Content: No SI/HI/AH/VH; No apparent delusions  Associations:  intact  Insight:  fair due to recognizing need to go to CD  Judgment:  fair due to willingness to engage with treatment and go to CD  Attention Span: adequate for conversation  Concentration:  grossly intact  Recent and Remote Memory:  not formally assessed  Fund of Knowledge: average     Allergies     Allergies   Allergen Reactions     Other Drug Allergy (See Comments)      Fake metal        Labs & Imaging     No results found for this or any previous visit (from the past 24 hour(s)).       Assessment   Diagnostic Impression:  See Mendenhall is a 31 year old gentleman with a past psychiatric diagnosis of  Bipolar 2 disorder, substance use disorder, depressive disorder and anxiety disorder, PTSD and ADHD. He was admitted from the ER on 2022 where he was brought by the police due to concern for psychosis and SI with plan. This in the context of methamphetamine and cocaine use, unclear regarding medication non-adherence, he has " significant history of substance use and psychosocial stressors including housing instability, unemployment, legal issues, trauma and chronic mental health issues.     He was brought to the ED with SI, erratic and impulsive behaviors and psychosis (paranoid delusions and disorganized behavior/thought process) in the context of methamphetamine and cocaine use.  His last psychiatric hospitalization was in April/2022 at [facility] for psychosis in context of substance use.  He is currently followed by PCP Erinn Maradiaga at [facility]. Current psychosocial stressors include legal issues, trauma, chronic mental health issues, family dynamics and medical issues which he has been coping with by using substances, acting out to self and acting out to others.  Patient's support system includes sister Samaria and friend Wilbert.      Substance use does appear to be playing a contributing role in the patient's presentation. Medical history does not appear to be significant, although chart review indicates history of chronic back pain, prescribed anabolic steroids that may be playing a role in presentation. In utero exposure and developmental delay need to be assessed.       Psychosocially history of trauma in childhood, long history of substance use, currently being unemployed and experiencing housing insecurity, legal issues, not being able to see his daughter represent both precipitating and perpetuating factors contributing to presentation.      The MSE is notable for some persisting paranoia, mood lability, being guarded, tangentiality and limited attention span/concentration, hyper-talkativeness difficult to interrupt speech. He denies self injurious behaviors. His reported symptoms of VH, paranoid delusions and grossly disorganized thought process in the context of methamphetamine and cocaine use are suggestive of substance-induced psychosis, although definitive diagnosis is still in evolution and further  collateral information from family/ outpatient provider is needed at this time; differential includes primary psychotic disorder exacerbated by substance use, PTSD, Bipolar Disorder, schizoaffective disorder.  Additionally, he has traits of impulsive behaviors which interfere with his ability to adhere with the treatment plan.       Optimization of medications to target these symptom clusters in addition to evaluation of adequate community supports will be targets for treatment during this admission.      Pt has history of previous attempts and substance use representing heightened acute risk for self/others patient warrants inpatient psychiatric hospitalization to maintain his safety until door to door treatment can be arranged. Disposition pending clinical stabilization, medication optimization and development of an appropriate discharge plan, including CD treatment referral upon discharge.     Principal Diagnoses:   Substance use disorder (stimuland, opioid, sedative hypnotic)    Secondary Diagnoses:   Substance induced psychosis, now resolved  Major depressive disorder, recurrent episode, moderate   Generalized anxiety disorder    Psychiatric & Medical course:  See Mendenhall was admitted to [facility] on a 72 hour hold, transferred to [facility] following a conflict with another patient. Patient in question has been removed from [facility], so now Mr. Mendenhall has returned to [facility]. His PTA prazosin was continued.      See Mendenhall continued to meet criteria for inpatient hospitalization medication optimization, inpatient stabilization, and appropriate discharge planning.      7/19: added Seroquel 50 mg BEDTIME; 25 mg TID PRN   7/20: Quetiapine (Seroquel) increased to 100 mg, BEDTIME scheduled. Keep 25 mg TID PRN  7/20: melatonin 3mg scheduled per patient request  7/25: Pending confirmation of CARE referral and status on list with outpatient CM. If anticipated to be a prolonged wait for placement,  will consider less restrictive options for CD.  7/26: Ongoing issues with outpatient CM Agatha not returning messages or answering calls from team or patient. Message left with CM supervisor given persistent issues reaching CM.  7/27: OP CM strongly advocating for CARE placement given patient's history of several failed treatments and violent/aggressive behaviors in context of substance use. OP CM did confirm that she placed CARE referral. Pt continues to voice frustration about length of stay and delay in CARE referral as he was informed that the referral was placed over one week ago.   7/28: Returned to [facility] from [facility]. CARE placement planned, likely will be available within two weeks. Mr. Mendenhall requests audience with  (Agatha) to explain circumstances of relapse and admission, even if it will not change the course of treatment or discharge to CARE facility, still wishes to clarify details. Changed scheduled SEROQUEL to ZYPREXA 10 mg at bedtime. SEROQUEL ordered PRN  7/29: CARE placement planned, but Mr. Mendenhall continues to request audience with  (Agatha). Should have been placed on list for CARE facility previously as he is on hospital day 13. Will ensure this is done today.  scheduled to visit unit and speak with Mr. Mendenhall on 8/1. Persistent anxiety, request for valium denied due to CD history and need to maintain CARE eligibility. Increased PRN ZYPREXA to accommodate maximum total dose of 40 mg/day (along with scheduled). Complains of shoulder pain s/p rotator cuff surgery and biceps tear in 12/2021, request for tramadol denied due to CD history. Ordered PRN naproxen and PRN lidocaine cream instead.  8/1: Seasonal allergies; ordered PRN Cetirizine 10 mg   8/2: patient requested testosterone supplementation; ordered serum testosterone and SHBG  8/3: Added desvenlafaxine 25 mg daily and added PRN melatonin 3 mg to existing scheduled melatonin 3 mg QPM;  "Testosterone labs pending.  8/8: Testosterone  & Sex Hormone Binding Globulin levels within normal range; results shared with patient.  8/10: increased desvenlafaxine to 50 mg daily, changed lidocaine gel to patch for biceps pain  8/12: IPCD consult for suboxone and Code 3 exception : \"Please consider exception to patient activities off of secure unit policy (Code 3). Patient would be escorted by security.\"  8/15: Increased PRN Tylenol and Naproxen  8/17: Started suboxone 2-0.5 mg today for two doses; increase according to Dr. Roldan's consult note         Plan   Today's Changes (PRN medications):     Increased Suboxone to 8-2 mg per 1 film    Increased Seroquel to 150 mg  _______________________________________________________________________  Psychiatric Management:    Prazosin 1mg at bedtime    Civil commitment with Das    Additional Planning:    Continue to monitor for and stabilize: irritable, psychosis and substance use    Patient will be treated in therapeutic milieu with appropriate individual and group therapies as described.    Scheduled Medications (summary):  Current Facility-Administered Medications   Medication Dose Route Frequency     buprenorphine HCl-naloxone HCl  1 Film Sublingual BID     cetirizine  10 mg Oral Daily     desvenlafaxine  50 mg Oral Daily     lidocaine  1 patch Transdermal Q24H     lidocaine   Transdermal Q8H ALBA     melatonin  3 mg Oral At Bedtime     multivitamin w/minerals  1 tablet Oral Daily     OLANZapine  10 mg Oral At Bedtime     polyethylene glycol  17 g Oral Daily     prazosin  1 mg Oral At Bedtime     PRN Medications (summary):  Current Facility-Administered Medications   Medication Dose Route Frequency     acetaminophen  975 mg Oral Q6H PRN     albuterol  2 puff Inhalation Q6H PRN     alum & mag hydroxide-simethicone  30 mL Oral Q4H PRN     diphenhydrAMINE  50 mg Oral Once PRN    Or     diphenhydrAMINE  50 mg IV/IM Once PRN     EPINEPHrine  0.3 mg Intramuscular Q5 Min " PRN     hydrocortisone   Topical BID PRN     hydrOXYzine  25 mg Oral Q4H PRN     melatonin  3 mg Oral At Bedtime PRN     naproxen  250 mg Oral 4x Daily PRN     nicotine  4 mg Buccal Q1H PRN     OLANZapine  5-10 mg Oral TID PRN    Or     OLANZapine  10 mg Intramuscular TID PRN     QUEtiapine  150 mg Oral At Bedtime PRN     QUEtiapine  25-50 mg Oral TID PRN     Consult:    Dr. Seo, from Medicine curbsided regarding patient's abnormal EKGs. As per Medicine, since the patient is asymptomatic, with normal vitals, only with five higher than normal BP readings from twenty-three, and regular heart rate and rhythm, it seems that EKG may be demonstrating more of a chronic condition. It is recommended for patient to follow-up with PCP and/or Cardiology once discharged. No urgency at this point.    Legal Status:    Das    Committed     SIO:    No    Pt has not required locked seclusion or restraints in the past 24 hours to maintain safety, please refer to RN documentation for further details.    Safety Assessment:   Behavioral Orders   Procedures     Assault precautions     Code 1 - Restrict to Unit     Code 3     Routine Programming     As clinically indicated     Status 15     Every 15 minutes.     Disposition:    Reason for ongoing admission:  Civil commitment pending placement for CD for direct transfer    Discharge location: TBD, pending clinical stabilization, medication optimization, and formulation of a safe discharge plan. Likely CARE, possibly within the next 14 days.      Discharge Medications: not ordered    Follow-up Appointments: not scheduled  ____________________________________________________________________  Pertinent Medical diagnoses and management:    None  ____________________________________________________________________      Theresa Reese MD  PGY1 Psychiatry Resident    Attestation:  This patient has been seen and evaluated by me, Galina De Jesus MD.  I have discussed this patient with the  house staff team including the resident and medical student and I agree with the findings and plan in this note.    I have reviewed today's vital signs, medications, labs and imaging. Galina De Jseus MD , PhD.

## 2022-08-23 NOTE — PROGRESS NOTES
08/22/22 2100   Group Therapy Session   Group Attendance attended group session   Time Session Began 2000   Time Session Ended 2100   Total Time patient participated (minutes) 30   Total # Attendees 5   Group Type expressive therapy   Group Topic Covered emotions/expression   Patient Response/Contribution cooperative with task     Art Therapy directive was to create a personal self symbol and three images symbolizing pts past, present and future using lines, shapes and colors.  Goals of directive: to create a visual personal narrative, to express parts of self/identity, to identify personal strengths and goals, emotional expression, media exploration.   Pt was an engaged participant, focused on drawing for the full duration of group. Pt finished drawings and briefly shared with author and group. Pt created images with roads representing pt feeling stuck (past), needing to stop and reflect (present) and going down a positive path (future) Pt included his daughters name in the artwork and talked about how he is saving his artwork to show to his daughter some day to help explain that pt was working on his health during the time of his stay on the unit. Pts mood was calm, pleasant participant.

## 2022-08-23 NOTE — PLAN OF CARE
Patient appeared sleeping during shift, intermittently awake for bathroom use and repositioning, no prn medications or snacks given during shift, safety checks are in placed.   Problem: Sleep Disturbance  Goal: Adequate Sleep/Rest  Outcome: Ongoing, Progressing   Goal Outcome Evaluation:

## 2022-08-23 NOTE — PLAN OF CARE
08/23/22 1148   Individualization/Patient Specific Goals   Patient Personal Strengths expressive of emotions;insight into illness/situation;humor;resilient;resourceful;medication/treatment adherence;motivated for treatment   Patient Vulnerabilities adverse childhood experience(s);substance abuse/addiction;history of unsuccessful treatment;legal concerns;occupational insecurity;housing insecurity   Anxieties, Fears or Concerns Want to get out of hospital ASAP   Individualized Care Needs None   Patient-Specific Goals (Include Timeframe) Transfer to Residential treatment   Interprofessional Rounds   Participants CTC;nursing;patient;psychiatrist;OT   Team Discussion   Participants Dr. De Jesus, Dr. Reese, Dr. Morfin, Dr. Arias, Ashley Romero RN, Emiliana Colunga MA.LP, Pharm student   Progress Patient has been  stable.  He has been cooperative with meds. No behavioral issues. [facility] authorized funding /placement for residential MI/CD treatment. Likely transfer to [facility] this week   Anticipated length of stay 3-5 days   Continued Stay Criteria/Rationale Patient is civilly committed with Das order. PLacement at Residential MI/CD facility. Awaiting acceptance   Medical/Physical Arm pain-   Plan Referrals have been made to [facility], [facility]. Records will be sent today for review   Rationale for change in precautions or plan No change in plan/precautions   Anticipated Discharge Disposition substance use treatment;another healthcare facility

## 2022-08-24 PROCEDURE — 124N000002 HC R&B MH UMMC

## 2022-08-24 PROCEDURE — 250N000013 HC RX MED GY IP 250 OP 250 PS 637: Performed by: PSYCHIATRY & NEUROLOGY

## 2022-08-24 PROCEDURE — 250N000013 HC RX MED GY IP 250 OP 250 PS 637

## 2022-08-24 PROCEDURE — 99231 SBSQ HOSP IP/OBS SF/LOW 25: CPT | Mod: GC | Performed by: PSYCHIATRY & NEUROLOGY

## 2022-08-24 PROCEDURE — G0177 OPPS/PHP; TRAIN & EDUC SERV: HCPCS

## 2022-08-24 RX ADMIN — ALBUTEROL SULFATE 2 PUFF: 90 AEROSOL, METERED RESPIRATORY (INHALATION) at 19:24

## 2022-08-24 RX ADMIN — NICOTINE POLACRILEX 4 MG: 4 GUM, CHEWING BUCCAL at 21:03

## 2022-08-24 RX ADMIN — NAPROXEN 250 MG: 250 TABLET ORAL at 19:26

## 2022-08-24 RX ADMIN — OLANZAPINE 10 MG: 5 TABLET, FILM COATED ORAL at 19:26

## 2022-08-24 RX ADMIN — CETIRIZINE HYDROCHLORIDE 10 MG: 10 TABLET, FILM COATED ORAL at 08:52

## 2022-08-24 RX ADMIN — PRAZOSIN HYDROCHLORIDE 1 MG: 1 CAPSULE ORAL at 21:42

## 2022-08-24 RX ADMIN — MULTIPLE VITAMINS W/ MINERALS TAB 1 TABLET: TAB at 08:52

## 2022-08-24 RX ADMIN — HYDROXYZINE HYDROCHLORIDE 25 MG: 25 TABLET ORAL at 22:31

## 2022-08-24 RX ADMIN — NICOTINE POLACRILEX 4 MG: 4 GUM, CHEWING BUCCAL at 13:58

## 2022-08-24 RX ADMIN — ACETAMINOPHEN 975 MG: 325 TABLET, FILM COATED ORAL at 10:04

## 2022-08-24 RX ADMIN — NICOTINE POLACRILEX 4 MG: 4 GUM, CHEWING BUCCAL at 21:51

## 2022-08-24 RX ADMIN — ACETAMINOPHEN 975 MG: 325 TABLET, FILM COATED ORAL at 19:22

## 2022-08-24 RX ADMIN — POLYETHYLENE GLYCOL 3350 17 G: 17 POWDER, FOR SOLUTION ORAL at 08:52

## 2022-08-24 RX ADMIN — QUETIAPINE FUMARATE 50 MG: 25 TABLET ORAL at 14:18

## 2022-08-24 RX ADMIN — QUETIAPINE FUMARATE 150 MG: 100 TABLET ORAL at 21:43

## 2022-08-24 RX ADMIN — LIDOCAINE PATCH 4% 1 PATCH: 40 PATCH TOPICAL at 08:51

## 2022-08-24 RX ADMIN — DESVENLAFAXINE SUCCINATE 50 MG: 50 TABLET, EXTENDED RELEASE ORAL at 08:52

## 2022-08-24 RX ADMIN — OLANZAPINE 10 MG: 10 TABLET, FILM COATED ORAL at 21:43

## 2022-08-24 RX ADMIN — BUPRENORPHINE AND NALOXONE 1 FILM: 8; 2 FILM BUCCAL; SUBLINGUAL at 19:05

## 2022-08-24 RX ADMIN — NAPROXEN 250 MG: 250 TABLET ORAL at 12:46

## 2022-08-24 RX ADMIN — OLANZAPINE 5 MG: 5 TABLET, FILM COATED ORAL at 12:05

## 2022-08-24 RX ADMIN — NICOTINE POLACRILEX 4 MG: 4 GUM, CHEWING BUCCAL at 12:05

## 2022-08-24 RX ADMIN — BUPRENORPHINE AND NALOXONE 1 FILM: 8; 2 FILM BUCCAL; SUBLINGUAL at 08:52

## 2022-08-24 RX ADMIN — MELATONIN TAB 3 MG 3 MG: 3 TAB at 21:42

## 2022-08-24 RX ADMIN — NICOTINE POLACRILEX 4 MG: 4 GUM, CHEWING BUCCAL at 08:52

## 2022-08-24 ASSESSMENT — ACTIVITIES OF DAILY LIVING (ADL)
ADLS_ACUITY_SCORE: 29
LAUNDRY: UNABLE TO COMPLETE
ADLS_ACUITY_SCORE: 29
DRESS: INDEPENDENT
ADLS_ACUITY_SCORE: 29
HYGIENE/GROOMING: HANDWASHING;INDEPENDENT
ADLS_ACUITY_SCORE: 29
HYGIENE/GROOMING: INDEPENDENT
ADLS_ACUITY_SCORE: 29
ORAL_HYGIENE: INDEPENDENT
ADLS_ACUITY_SCORE: 29
ORAL_HYGIENE: INDEPENDENT
DRESS: INDEPENDENT
ADLS_ACUITY_SCORE: 29

## 2022-08-24 NOTE — PLAN OF CARE
Pt presents with a bright affect. Pleasant and cooperative with staff, medication compliant. He attends/participated in meetings and groups. Pt rated his anxiety and depression both at 6/10. Denied SI/SIB/hallucinations. Pt was anxious about waiting for an  IRTS. He was making calls and coordinates with the CTC.  Pt complains of  pain on his R biceps. PRN Tylenol and Naproxen requested/administered.     Problem: Behavioral Health Plan of Care  Goal: Patient-Specific Goal (Individualization)  Outcome: Ongoing, Progressing  Flowsheets (Taken 8/24/2022 1317)  Patient Personal Strengths:   expressive of emotions   insight into illness/situation     Problem: Behavioral Health Plan of Care  Goal: Adheres to Safety Considerations for Self and Others  Outcome: Ongoing, Progressing  Intervention: Develop and Maintain Individualized Safety Plan  Recent Flowsheet Documentation  Taken 8/24/2022 1254 by Fallon Dee RN  Safety Measures: safety rounds completed     Problem: Suicidal Behavior  Goal: Suicidal Behavior is Absent or Managed  Outcome: Ongoing, Progressing   Goal Outcome Evaluation:    Plan of Care Reviewed With: patient

## 2022-08-24 NOTE — PLAN OF CARE
Patient appeared sleeping without concerns, intermittently awake for bathroom use and repositioning, no prn or snacks given during shift, safety checks are in place.   Problem: Sleep Disturbance  Goal: Adequate Sleep/Rest  Outcome: Ongoing, Progressing   Goal Outcome Evaluation:

## 2022-08-24 NOTE — PROGRESS NOTES
"    ----------------------------------------------------------------------------------------------------------  [facility]  Psychiatric Progress Note  Hospital Day #39    See Mendenhall MRN# [id number]   Age: 31 year old YOB: 1990   Date of Admission: 7/14/2022     Subjective   The patient was discussed with the treatment team and chart notes were reviewed.      Identifier: 31 year old male with ADAM     Sleep:  6.75 hours (08/24/22 0600)   Prescribed Medications: Taken as prescribed  PRN Psychiatric Medications: acetaminophen, albuterol, alum & mag hydroxide-simethicone, hydrocortisone, hydrOXYzine, lidocaine 4%, nicotine, OLANZapine, QUEtiapine    Overnight Nursing Notes/Staff Report:  \" Pt was happy to receive his insurance cards today.  Pt was able to attend and participate in groups.  He met his other goals of making calls for today. Patient was visible in milieu after supper, he was social and interactive with peers and staff members. Right upper arm pain rated at 10/10, pain managed with PRN Naproxen.\"    Patient interview:  Patient reports he coordinated his mail to be routed to the hospital and got his new insurance, which is a relief. Looking to go to [facility], and working with [facility]. Sleep is improving. His aunt is coming from [place], and will drop off clothes for treatment.  He likes the olanzapine three times a day, and was interested in going up to 20 mg of his suboxone like it used to be.  Patient reports that everything else is fine.   -------------------------------------------------------------------------------------------------------------------------  Suicidal ideation: denies current or recent suicidal ideation or behaviors.  Homicidal ideation: denies current or recent homicidal ideation or behaviors.  Psychotic symptoms: Patient denies AH, VH, paranoia, delusions.     Medication side effects reported: No significant side effects.  Acute " "medical concerns: none     ROS   ROS was negative unless noted above.     Objective   Vitals:  Temp: 97.6  F (36.4  C) (Temp  Min: 97.6  F (36.4  C)  Max: 98.4  F (36.9  C))  Resp: 16 (Resp  Min: 16  Max: 16)  SpO2: 97 % (SpO2  Min: 97 %  Max: 98 %)  Pulse: 70 (Pulse  Min: 70  Max: 84)  BP: (!) 147/78  Systolic (24hrs), Av , Min:122 , Max:147     Diastolic (24hrs), Av, Min:75, Max:78      Mental Status Examination:  Oriented to:  Person/Self, Situation, Date and location  General: Awake and Alert  Appearance:  Appears stated age  Behavior:  calm, cooperative and engaged  Eye Contact:  adequate  Psychomotor:  no abnormal motor symptoms appreciated; no catatonia present  Speech:  appropriate volume/tone, talkative and with good articulation  Language: Fluent in English with appropriate syntax and vocabulary.  Mood:  \"doing fine\"  Affect:  appropriate, congruent with mood and broad/full  Thought Process:  linear, goal directed and perseveration  Thought Content: No SI/HI/AH/VH; No apparent delusions  Associations:  intact  Insight:  fair due to recognizing need to go to CD  Judgment:  good due to willingness to engage with treatment and go to CD  Attention Span: adequate for conversation  Concentration:  grossly intact  Recent and Remote Memory:  not formally assessed  Fund of Knowledge: average     Allergies     Allergies   Allergen Reactions     Other Drug Allergy (See Comments)      Fake metal        Labs & Imaging     No results found for this or any previous visit (from the past 24 hour(s)).       Assessment   Diagnostic Impression:  See Mendenhall is a 31 year old gentleman with a past psychiatric diagnosis of  Bipolar 2 disorder, substance use disorder, depressive disorder and anxiety disorder, PTSD and ADHD. He was admitted from the ER on 2022 where he was brought by the police due to concern for psychosis and SI with plan. This in the context of methamphetamine and cocaine use, unclear " regarding medication non-adherence, he has significant history of substance use and psychosocial stressors including housing instability, unemployment, legal issues, trauma and chronic mental health issues.     He was brought to the ED with SI, erratic and impulsive behaviors and psychosis (paranoid delusions and disorganized behavior/thought process) in the context of methamphetamine and cocaine use.  His last psychiatric hospitalization was in April/2022 at [facility] for psychosis in context of substance use.  He is currently followed by PCP Erinn Maradiaga at [facility]. Current psychosocial stressors include legal issues, trauma, chronic mental health issues, family dynamics and medical issues which he has been coping with by using substances, acting out to self and acting out to others.  Patient's support system includes sister Samaria and friend Wilbert.      Substance use does appear to be playing a contributing role in the patient's presentation. Medical history does not appear to be significant, although chart review indicates history of chronic back pain, prescribed anabolic steroids that may be playing a role in presentation. In utero exposure and developmental delay need to be assessed.       Psychosocially history of trauma in childhood, long history of substance use, currently being unemployed and experiencing housing insecurity, legal issues, not being able to see his daughter represent both precipitating and perpetuating factors contributing to presentation.      The MSE is notable for some persisting paranoia, mood lability, being guarded, tangentiality and limited attention span/concentration, hyper-talkativeness difficult to interrupt speech. He denies self injurious behaviors. His reported symptoms of VH, paranoid delusions and grossly disorganized thought process in the context of methamphetamine and cocaine use are suggestive of substance-induced psychosis, although definitive  diagnosis is still in evolution and further collateral information from family/ outpatient provider is needed at this time; differential includes primary psychotic disorder exacerbated by substance use, PTSD, Bipolar Disorder, schizoaffective disorder.  Additionally, he has traits of impulsive behaviors which interfere with his ability to adhere with the treatment plan.       Optimization of medications to target these symptom clusters in addition to evaluation of adequate community supports will be targets for treatment during this admission.      Pt has history of previous attempts and substance use representing heightened acute risk for self/others patient warrants inpatient psychiatric hospitalization to maintain his safety until door to door treatment can be arranged. Disposition pending clinical stabilization, medication optimization and development of an appropriate discharge plan, including CD treatment referral upon discharge.     Principal Diagnoses:   Substance use disorder (stimuland, opioid, sedative hypnotic)    Secondary Diagnoses:   Substance induced psychosis, now resolved  Major depressive disorder, recurrent episode, moderate   Generalized anxiety disorder    Psychiatric & Medical course:  See Mendenhall was admitted to [facility] on a 72 hour hold, transferred to [facility] following a conflict with another patient. Patient in question has been removed from Station [facility], so now Mr. Mendenhall has returned to [facility] 20. His PTA prazosin was continued.      See Mendenhall continued to meet criteria for inpatient hospitalization medication optimization, inpatient stabilization, and appropriate discharge planning.      7/19: added Seroquel 50 mg BEDTIME; 25 mg TID PRN   7/20: Quetiapine (Seroquel) increased to 100 mg, BEDTIME scheduled. Keep 25 mg TID PRN  7/20: melatonin 3mg scheduled per patient request  7/25: Pending confirmation of CARE referral and status on list with outpatient CM. If  anticipated to be a prolonged wait for placement, will consider less restrictive options for CD.  7/26: Ongoing issues with outpatient CM Agatha not returning messages or answering calls from team or patient. Message left with CM supervisor given persistent issues reaching CM.  7/27: OP CM strongly advocating for CARE placement given patient's history of several failed treatments and violent/aggressive behaviors in context of substance use. OP CM did confirm that she placed CARE referral. Pt continues to voice frustration about length of stay and delay in CARE referral as he was informed that the referral was placed over one week ago.   7/28: Returned to [facility] from [facility]. CARE placement planned, likely will be available within two weeks. Mr. Mendenhall requests audience with  (Agatha) to explain circumstances of relapse and admission, even if it will not change the course of treatment or discharge to CARE facility, still wishes to clarify details. Changed scheduled SEROQUEL to ZYPREXA 10 mg at bedtime. SEROQUEL ordered PRN  7/29: CARE placement planned, but Mr. Mendenhall continues to request audience with  (Agatha). Should have been placed on list for CARE facility previously as he is on hospital day 13. Will ensure this is done today.  scheduled to visit unit and speak with Mr. Mendenhall on 8/1. Persistent anxiety, request for valium denied due to CD history and need to maintain CARE eligibility. Increased PRN ZYPREXA to accommodate maximum total dose of 40 mg/day (along with scheduled). Complains of shoulder pain s/p rotator cuff surgery and biceps tear in 12/2021, request for tramadol denied due to CD history. Ordered PRN naproxen and PRN lidocaine cream instead.  8/1: Seasonal allergies; ordered PRN Cetirizine 10 mg   8/2: patient requested testosterone supplementation; ordered serum testosterone and SHBG  8/3: Added desvenlafaxine 25 mg daily and added PRN melatonin 3 mg  "to existing scheduled melatonin 3 mg QPM; Testosterone labs pending.  8/8: Testosterone  & Sex Hormone Binding Globulin levels within normal range; results shared with patient.  8/10: increased desvenlafaxine to 50 mg daily, changed lidocaine gel to patch for biceps pain  8/12: IPCD consult for suboxone and Code 3 exception : \"Please consider exception to patient activities off of secure unit policy (Code 3). Patient would be escorted by security.\"  8/15: Increased PRN Tylenol and Naproxen  8/17: Started suboxone 2-0.5 mg today for two doses; increase according to Dr. Roldan's consult note         Plan   Today's Changes (PRN medications):     none  _______________________________________________________________________  Psychiatric Management:    Civil commitment with Das    Additional Planning:    Continue to monitor for and stabilize: irritable, psychosis and substance use    Patient will be treated in therapeutic milieu with appropriate individual and group therapies as described.    Scheduled Medications (summary):  Current Facility-Administered Medications   Medication Dose Route Frequency     buprenorphine HCl-naloxone HCl  1 Film Sublingual BID     cetirizine  10 mg Oral Daily     desvenlafaxine  50 mg Oral Daily     lidocaine  1 patch Transdermal Q24H     lidocaine   Transdermal Q8H ALBA     melatonin  3 mg Oral At Bedtime     multivitamin w/minerals  1 tablet Oral Daily     OLANZapine  10 mg Oral At Bedtime     polyethylene glycol  17 g Oral Daily     prazosin  1 mg Oral At Bedtime     PRN Medications (summary):  Current Facility-Administered Medications   Medication Dose Route Frequency     acetaminophen  975 mg Oral Q6H PRN     albuterol  2 puff Inhalation Q6H PRN     alum & mag hydroxide-simethicone  30 mL Oral Q4H PRN     diphenhydrAMINE  50 mg Oral Once PRN    Or     diphenhydrAMINE  50 mg IV/IM Once PRN     EPINEPHrine  0.3 mg Intramuscular Q5 Min PRN     hydrocortisone   Topical BID PRN     " hydrOXYzine  25 mg Oral Q4H PRN     melatonin  3 mg Oral At Bedtime PRN     naproxen  250 mg Oral 4x Daily PRN     nicotine  4 mg Buccal Q1H PRN     OLANZapine  5-10 mg Oral TID PRN    Or     OLANZapine  10 mg Intramuscular TID PRN     QUEtiapine  150 mg Oral At Bedtime PRN     QUEtiapine  25-50 mg Oral TID PRN     Consult:    Dr. Seo, from Medicine curbsided regarding patient's abnormal EKGs. As per Medicine, since the patient is asymptomatic, with normal vitals, only with five higher than normal BP readings from twenty-three, and regular heart rate and rhythm, it seems that EKG may be demonstrating more of a chronic condition. It is recommended for patient to follow-up with PCP and/or Cardiology once discharged. No urgency at this point.    Legal Status:    Das    Committed     SIO:    No    Pt has not required locked seclusion or restraints in the past 24 hours to maintain safety, please refer to RN documentation for further details.    Safety Assessment:   Behavioral Orders   Procedures     Assault precautions     Code 1 - Restrict to Unit     Code 3     Routine Programming     As clinically indicated     Status 15     Every 15 minutes.     Disposition:    Reason for ongoing admission:  Civil commitment pending placement for CD for direct transfer    Discharge location: [facility], pending clinical stabilization, medication optimization, and formulation of a safe discharge plan. Possibly [facility] treatment    Discharge Medications: not ordered    Follow-up Appointments: not scheduled  ____________________________________________________________________  Pertinent Medical diagnoses and management:    None  ____________________________________________________________________    This patient was seen and evaluated with attending Dr. De Jesus, who agrees with the assessment and plan.    Frankie Krause MD  Psychiatry Resident Physician, PGY-2  [facility]      Attestation:  This patient has been seen and  evaluated by me, Galina De Jesus MD.  I have discussed this patient with the house staff team including the resident and medical student and I agree with the findings and plan in this note.    I have reviewed today's vital signs, medications, labs and imaging. Galina De Jesus MD , PhD.

## 2022-08-24 NOTE — PLAN OF CARE
Problem: Behavioral Health Plan of Care  Goal: Adheres to Safety Considerations for Self and Others  Outcome: Ongoing, Progressing  Flowsheets (Taken 8/23/2022 1928)  Adheres to Safety Considerations for Self and Others: making progress toward outcome     Problem: Suicidal Behavior  Goal: Suicidal Behavior is Absent or Managed  Outcome: Ongoing, Progressing  Flowsheets (Taken 8/23/2022 1928)  Mutually Determined Action Steps (Suicidal Behavior Absent/Managed): sets future-oriented goal     Problem: Behavioral Health Plan of Care  Goal: Absence of New-Onset Illness or Injury  Outcome: Ongoing, Progressing   Goal Outcome Evaluation:    Plan of Care Reviewed With: patient     Patient was pleasant, cooperative, and compliant with medications. Patient was visible in milieu after supper, he was social and interactive with peers and staff members. Right upper arm pain rated at 10/10, pain managed with PRN Naproxen. He reported no anxiety or depression, no SI/HI, contracted for safety.

## 2022-08-24 NOTE — PLAN OF CARE
BEH Occupational Therapy Group Intervention Note     08/24/22 1232   Group Therapy Session   Group Attendance attended group session   Time Session Began 1115   Time Session Ended 1200   Total Time patient participated (minutes) 40   Total # Attendees 4   Group Type recreation;task skill   Group Topic Covered cognitive activities;structured socialization;leisure exploration/use of leisure time   Group Session Detail Leisure exploration and participation group offered for increased intrinsic motivation to engage in social, non-obligatory occupations via a group game. Structured group was used to promote positive milieu interaction and collaboration.    Patient Response/Contribution cooperative with task;organized   Patient Response Detail Congruent- pleasant affect yet quite physically restless. Reported difficulty sitting still. Full participation; sequencing and attention to detail WNL. Social with peers.      Opal Jaeger OT on 8/24/2022 at 12:33 PM

## 2022-08-24 NOTE — PLAN OF CARE
Assessment/Intervention/Current Symtoms and Care Coordination  The patient's care was discussed with the treatment team and chart notes were reviewed  Patient met with team.  No changes in past few days. Patient has been stable.  Patient has been attending groups, compliant with meds, increasing suboxone per MD's     Received confirmation from Kiet Browne that patient has been found eligible for the behavioral health fund for CD treatment funding.  Referrals made to [facility].  Records have been faxed. Awaiting review of records.    Discharge Plan or Goal  Residential MI/CD treatment  Due to long wait time at CARE - supervisor is now agreeable to a possible residential outpatient program vs CARE.   Psychiatric f/u care in place.     Barriers to Discharge   MI/CD Placement     Referral Status  None today     Legal Status     Civil commitment with Das order  Patient will require provisional discharge

## 2022-08-25 PROCEDURE — 250N000013 HC RX MED GY IP 250 OP 250 PS 637

## 2022-08-25 PROCEDURE — G0177 OPPS/PHP; TRAIN & EDUC SERV: HCPCS

## 2022-08-25 PROCEDURE — 124N000002 HC R&B MH UMMC

## 2022-08-25 PROCEDURE — 250N000013 HC RX MED GY IP 250 OP 250 PS 637: Performed by: PSYCHIATRY & NEUROLOGY

## 2022-08-25 PROCEDURE — 99232 SBSQ HOSP IP/OBS MODERATE 35: CPT | Mod: GC | Performed by: PSYCHIATRY & NEUROLOGY

## 2022-08-25 RX ORDER — QUETIAPINE FUMARATE 200 MG/1
200 TABLET, FILM COATED ORAL
Status: DISCONTINUED | OUTPATIENT
Start: 2022-08-25 | End: 2022-08-26

## 2022-08-25 RX ADMIN — CETIRIZINE HYDROCHLORIDE 10 MG: 10 TABLET, FILM COATED ORAL at 09:10

## 2022-08-25 RX ADMIN — ALBUTEROL SULFATE 2 PUFF: 90 AEROSOL, METERED RESPIRATORY (INHALATION) at 21:36

## 2022-08-25 RX ADMIN — ACETAMINOPHEN 975 MG: 325 TABLET, FILM COATED ORAL at 09:09

## 2022-08-25 RX ADMIN — NICOTINE POLACRILEX 4 MG: 4 GUM, CHEWING BUCCAL at 11:58

## 2022-08-25 RX ADMIN — OLANZAPINE 10 MG: 10 TABLET, FILM COATED ORAL at 20:25

## 2022-08-25 RX ADMIN — ACETAMINOPHEN 975 MG: 325 TABLET, FILM COATED ORAL at 21:35

## 2022-08-25 RX ADMIN — NICOTINE POLACRILEX 4 MG: 4 GUM, CHEWING BUCCAL at 09:09

## 2022-08-25 RX ADMIN — DESVENLAFAXINE SUCCINATE 50 MG: 50 TABLET, EXTENDED RELEASE ORAL at 09:09

## 2022-08-25 RX ADMIN — MELATONIN TAB 3 MG 3 MG: 3 TAB at 21:11

## 2022-08-25 RX ADMIN — NICOTINE POLACRILEX 4 MG: 4 GUM, CHEWING BUCCAL at 14:08

## 2022-08-25 RX ADMIN — QUETIAPINE 200 MG: 200 TABLET, FILM COATED ORAL at 21:11

## 2022-08-25 RX ADMIN — LIDOCAINE PATCH 4% 1 PATCH: 40 PATCH TOPICAL at 09:09

## 2022-08-25 RX ADMIN — NAPROXEN 250 MG: 250 TABLET ORAL at 21:35

## 2022-08-25 RX ADMIN — POLYETHYLENE GLYCOL 3350 17 G: 17 POWDER, FOR SOLUTION ORAL at 09:07

## 2022-08-25 RX ADMIN — NICOTINE POLACRILEX 4 MG: 4 GUM, CHEWING BUCCAL at 21:35

## 2022-08-25 RX ADMIN — BUPRENORPHINE AND NALOXONE 1 FILM: 8; 2 FILM BUCCAL; SUBLINGUAL at 09:09

## 2022-08-25 RX ADMIN — PRAZOSIN HYDROCHLORIDE 1 MG: 1 CAPSULE ORAL at 21:11

## 2022-08-25 RX ADMIN — HYDROXYZINE HYDROCHLORIDE 25 MG: 25 TABLET ORAL at 13:10

## 2022-08-25 RX ADMIN — NAPROXEN 250 MG: 250 TABLET ORAL at 13:10

## 2022-08-25 RX ADMIN — NICOTINE POLACRILEX 4 MG: 4 GUM, CHEWING BUCCAL at 13:10

## 2022-08-25 RX ADMIN — MULTIPLE VITAMINS W/ MINERALS TAB 1 TABLET: TAB at 09:09

## 2022-08-25 RX ADMIN — OLANZAPINE 10 MG: 5 TABLET, FILM COATED ORAL at 09:11

## 2022-08-25 RX ADMIN — OLANZAPINE 10 MG: 5 TABLET, FILM COATED ORAL at 13:09

## 2022-08-25 RX ADMIN — BUPRENORPHINE AND NALOXONE 1 FILM: 8; 2 FILM BUCCAL; SUBLINGUAL at 19:21

## 2022-08-25 ASSESSMENT — ACTIVITIES OF DAILY LIVING (ADL)
ADLS_ACUITY_SCORE: 29
HYGIENE/GROOMING: INDEPENDENT
DRESS: SCRUBS (BEHAVIORAL HEALTH);INDEPENDENT
ADLS_ACUITY_SCORE: 29
LAUNDRY: WITH SUPERVISION
ORAL_HYGIENE: INDEPENDENT
ADLS_ACUITY_SCORE: 29

## 2022-08-25 NOTE — PLAN OF CARE
"  Problem: Plan of Care - These are the overarching goals to be used throughout the patient stay.    Goal: Plan of Care Review/Shift Note  Description: The Plan of Care Review/Shift note should be completed every shift.  The Outcome Evaluation is a brief statement about your assessment that the patient is improving, declining, or no change.  This information will be displayed automatically on your shift note.  Outcome: Ongoing, Progressing  Flowsheets (Taken 8/25/2022 1001)  Plan of Care Reviewed With: patient  Overall Patient Progress: improving     Problem: Behavioral Health Plan of Care  Goal: Plan of Care Review  Outcome: Ongoing, Progressing  Flowsheets (Taken 8/25/2022 1001)  Plan of Care Reviewed With: patient  Overall Patient Progress: improving  Patient Agreement with Plan of Care: agrees  Goal: Adheres to Safety Considerations for Self and Others  Outcome: Ongoing, Progressing  Flowsheets (Taken 8/25/2022 1001)  Adheres to Safety Considerations for Self and Others: making progress toward outcome   Goal Outcome Evaluation:    Plan of Care Reviewed With: patient     Overall Patient Progress: improving  Patient visible in the milieu.Sociable with peers.Dog therapy session 1 pm.Right deltoid pain,PRN Tylenol & Naproxen given per patient request.Lidocaine topical analgesic topical patch applied.Appetite good,adequate intake.Patient reports agitation,PRN Zyprexa 10 mg given x 2 and Hydroxyzine x 1.Patient was irritated because one of the peers kept writing on his papers.Patient's goal is to take shower.Patient denies SI/SIB/AH/VH.  Vital signs:  Temp: 98  F (36.7  C) Temp src: Oral BP: 136/81 Pulse: 78   Resp: 18 SpO2: 98 % O2 Device: None (Room air)   Height: 180.3 cm (5' 11\") Weight: 104.2 kg (229 lb 11.2 oz)  Estimated body mass index is 32.04 kg/m  as calculated from the following:    Height as of this encounter: 1.803 m (5' 11\").    Weight as of this encounter: 104.2 kg (229 lb 11.2 oz).               "

## 2022-08-25 NOTE — PLAN OF CARE
Problem: Plan of Care - These are the overarching goals to be used throughout the patient stay.    Goal: Absence of Hospital-Acquired Illness or Injury  Intervention: Prevent Skin Injury  Recent Flowsheet Documentation  Taken 8/24/2022 1915 by Ede Mcnair, RN  Body Position: position changed independently     Problem: Behavioral Health Plan of Care  Goal: Optimal Comfort and Wellbeing  Intervention: Monitor Pain and Promote Comfort  Recent Flowsheet Documentation  Taken 8/24/2022 1915 by Ede Mcnair RN  Pain Management Interventions: medication (see MAR)     Problem: Suicidal Behavior  Goal: Suicidal Behavior is Absent or Managed  Outcome: Ongoing, Progressing   Goal Outcome Evaluation:    Plan of Care Reviewed With: patient     Patient was visible in milieu, he was social and interactive with peers and staff members. Able to communicate needs and feelings. Upper right arm pain rated at 10, pain managed with PRN Tylenol and Naproxen. Increased anxiety rated at 8, requested for PRN Zyprexa at 1915, Seroquel PRN at HS. Depression at 8, denied SI/HI, contracted for safety. Pleasant , cooperative, and compliant with medications.

## 2022-08-25 NOTE — PROGRESS NOTES
"    ----------------------------------------------------------------------------------------------------------  [facility]  Psychiatric Progress Note  Hospital Day #40    See Mendenhall MRN# [id number]   Age: 31 year old YOB: 1990   Date of Admission: 7/14/2022     Subjective   The patient was discussed with the treatment team and chart notes were reviewed.      Identifier: 31 year old male with ADAM     Sleep:  7 hours (08/25/22 0600)   Prescribed Medications: Taken as prescribed  PRN Psychiatric Medications: acetaminophen, albuterol, alum & mag hydroxide-simethicone, hydrocortisone, hydrOXYzine, lidocaine 4%, nicotine, OLANZapine, QUEtiapine    Overnight Nursing Notes/Staff Report:  \"Able to communicate needs and feelings. Upper right arm pain rated at 10, pain managed with PRN Tylenol and Naproxen. Increased anxiety rated at 8, requested for PRN Zyprexa at 1915, Seroquel PRN at HS. Depression at 8, denied SI/HI, contracted for safety. Pleasant , cooperative, and compliant with medications.\"  - Declined from [facility]  - Accepted at [facility]    Patient interview:  Patient reports his mood has been getting progressively better. He thinks the medications are helping but he's still getting \"agitated by past trauma\" and feels his thoughts racing. He'd like to increase quetiapine or olanzapine to a higher dose to help. Endorsed \"a lot\" of mental restlessness, but not really physical. Has state funding for a treatment center and had an interview this morning. He'll continue to work with staff for further coordination.    -------------------------------------------------------------------------------------------------------------------------  Suicidal ideation: denies current or recent suicidal ideation or behaviors.  Homicidal ideation: denies current or recent homicidal ideation or behaviors.  Psychotic symptoms: Patient denies AH, VH, paranoia, " "delusions.     Medication side effects reported: No significant side effects  Acute medical concerns: none     ROS   ROS was negative unless noted above.     Objective   Vitals:  Temp: 98  F (36.7  C) (Temp  Min: 98  F (36.7  C)  Max: 98  F (36.7  C))  Resp: 18 (Resp  Min: 18  Max: 18)  SpO2: 98 % (SpO2  Min: 98 %  Max: 98 %)  Pulse: 78 (Pulse  Min: 78  Max: 78)  BP: 136/81  Systolic (24hrs), Av , Min:136 , Max:136     Diastolic (24hrs), Av, Min:81, Max:81      Mental Status Examination:  Oriented to:  Person/Self, Situation, Date and location  General: Awake and Alert  Appearance:  Appears stated age  Behavior:  calm, cooperative and engaged  Eye Contact:  adequate  Psychomotor:  no abnormal motor symptoms appreciated; no catatonia present  Speech:  appropriate volume/tone, talkative and with good articulation  Language: Fluent in English with appropriate syntax and vocabulary.  Mood:  \"getting better\"  Affect:  appropriate, congruent with mood and broad/full  Thought Process:  linear, goal directed and perseveration  Thought Content: No SI/HI/AH/VH; No apparent delusions  Associations:  intact  Insight:  fair due to recognizing need to go to CD  Judgment:  good due to willingness to engage with treatment and go to CD  Attention Span: adequate for conversation  Concentration:  grossly intact  Recent and Remote Memory:  not formally assessed  Fund of Knowledge: average     Allergies     Allergies   Allergen Reactions     Other Drug Allergy (See Comments)      Fake metal        Labs & Imaging     No results found for this or any previous visit (from the past 24 hour(s)).       Assessment   Diagnostic Impression:  See Mendenhall is a 31 year old gentleman with a past psychiatric diagnosis of  Bipolar 2 disorder, substance use disorder, depressive disorder and anxiety disorder, PTSD and ADHD. He was admitted from the ER on 2022 where he was brought by the police due to concern for psychosis and SI with " plan. This in the context of methamphetamine and cocaine use, unclear regarding medication non-adherence, he has significant history of substance use and psychosocial stressors including housing instability, unemployment, legal issues, trauma and chronic mental health issues.     He was brought to the ED with SI, erratic and impulsive behaviors and psychosis (paranoid delusions and disorganized behavior/thought process) in the context of methamphetamine and cocaine use.  His last psychiatric hospitalization was in April/2022 at [facility] for psychosis in context of substance use.  He is currently followed by PCP Erinn Maradiaga at [facility]. Current psychosocial stressors include legal issues, trauma, chronic mental health issues, family dynamics and medical issues which he has been coping with by using substances, acting out to self and acting out to others.  Patient's support system includes sister Samaria and friend Wilbert.      Substance use does appear to be playing a contributing role in the patient's presentation. Medical history does not appear to be significant, although chart review indicates history of chronic back pain, prescribed anabolic steroids that may be playing a role in presentation. In utero exposure and developmental delay need to be assessed.       Psychosocially history of trauma in childhood, long history of substance use, currently being unemployed and experiencing housing insecurity, legal issues, not being able to see his daughter represent both precipitating and perpetuating factors contributing to presentation.      The MSE is notable for some persisting paranoia, mood lability, being guarded, tangentiality and limited attention span/concentration, hyper-talkativeness difficult to interrupt speech. He denies self injurious behaviors. His reported symptoms of VH, paranoid delusions and grossly disorganized thought process in the context of methamphetamine and cocaine  use are suggestive of substance-induced psychosis, although definitive diagnosis is still in evolution and further collateral information from family/ outpatient provider is needed at this time; differential includes primary psychotic disorder exacerbated by substance use, PTSD, Bipolar Disorder, schizoaffective disorder.  Additionally, he has traits of impulsive behaviors which interfere with his ability to adhere with the treatment plan.       Optimization of medications to target these symptom clusters in addition to evaluation of adequate community supports will be targets for treatment during this admission.      Pt has history of previous attempts and substance use representing heightened acute risk for self/others patient warrants inpatient psychiatric hospitalization to maintain his safety until door to door treatment can be arranged. Disposition pending clinical stabilization, medication optimization and development of an appropriate discharge plan, including CD treatment referral upon discharge.     Principal Diagnoses:   Substance use disorder (stimuland, opioid, sedative hypnotic)    Secondary Diagnoses:   Substance induced psychosis, now resolved  Major depressive disorder, recurrent episode, moderate   Generalized anxiety disorder    Psychiatric & Medical course:  See Mendenhall was admitted to [facility] on a 72 hour hold, transferred to [facility] following a conflict with another patient. Patient in question has been removed from [facility], so now Mr. Mendenhall has returned to [facility] 20. His PTA prazosin was continued.      See Mendenhall continued to meet criteria for inpatient hospitalization medication optimization, inpatient stabilization, and appropriate discharge planning.      7/19: added Seroquel 50 mg BEDTIME; 25 mg TID PRN   7/20: Quetiapine (Seroquel) increased to 100 mg, BEDTIME scheduled. Keep 25 mg TID PRN  7/20: melatonin 3mg scheduled per patient request  7/25: Pending  confirmation of CARE referral and status on list with outpatient CM. If anticipated to be a prolonged wait for placement, will consider less restrictive options for CD.  7/26: Ongoing issues with outpatient CM Agatha not returning messages or answering calls from team or patient. Message left with CM supervisor given persistent issues reaching CM.  7/27: OP CM strongly advocating for CARE placement given patient's history of several failed treatments and violent/aggressive behaviors in context of substance use. OP CM did confirm that she placed CARE referral. Pt continues to voice frustration about length of stay and delay in CARE referral as he was informed that the referral was placed over one week ago.   7/28: Returned to [facility] from [facility]. CARE placement planned, likely will be available within two weeks. Mr. Mendenhall requests audience with  (Agatha) to explain circumstances of relapse and admission, even if it will not change the course of treatment or discharge to CARE facility, still wishes to clarify details. Changed scheduled SEROQUEL to ZYPREXA 10 mg at bedtime. SEROQUEL ordered PRN  7/29: CARE placement planned, but Mr. Mendenhall continues to request audience with  (Agatha). Should have been placed on list for CARE facility previously as he is on hospital day 13. Will ensure this is done today.  scheduled to visit unit and speak with Mr. Mendenhall on 8/1. Persistent anxiety, request for valium denied due to CD history and need to maintain CARE eligibility. Increased PRN ZYPREXA to accommodate maximum total dose of 40 mg/day (along with scheduled). Complains of shoulder pain s/p rotator cuff surgery and biceps tear in 12/2021, request for tramadol denied due to CD history. Ordered PRN naproxen and PRN lidocaine cream instead.  8/1: Seasonal allergies; ordered PRN Cetirizine 10 mg   8/2: patient requested testosterone supplementation; ordered serum testosterone and  "SHBG  8/3: Added desvenlafaxine 25 mg daily and added PRN melatonin 3 mg to existing scheduled melatonin 3 mg QPM; Testosterone labs pending.  8/8: Testosterone  & Sex Hormone Binding Globulin levels within normal range; results shared with patient.  8/10: increased desvenlafaxine to 50 mg daily, changed lidocaine gel to patch for biceps pain  8/12: IPCD consult for suboxone and Code 3 exception : \"Please consider exception to patient activities off of secure unit policy (Code 3). Patient would be escorted by security.\"  8/15: Increased PRN Tylenol and Naproxen  8/17: Started suboxone 2-0.5 mg today for two doses; increase according to Dr. Roldan's consult note  8/25: Increased quetiapine at bedtime to 200 mg         Plan   Today's Changes (PRN medications):     Combined PRN quetiapine into one 200 mg dose  _______________________________________________________________________  Psychiatric Management:    Civil commitment with Das    Additional Planning:    Continue to monitor for and stabilize: irritable, psychosis and substance use    Patient will be treated in therapeutic milieu with appropriate individual and group therapies as described.    Scheduled Medications (summary):  Current Facility-Administered Medications   Medication Dose Route Frequency     buprenorphine HCl-naloxone HCl  1 Film Sublingual BID     cetirizine  10 mg Oral Daily     desvenlafaxine  50 mg Oral Daily     lidocaine  1 patch Transdermal Q24H     lidocaine   Transdermal Q8H ALBA     melatonin  3 mg Oral At Bedtime     multivitamin w/minerals  1 tablet Oral Daily     OLANZapine  10 mg Oral At Bedtime     polyethylene glycol  17 g Oral Daily     prazosin  1 mg Oral At Bedtime     PRN Medications (summary):  Current Facility-Administered Medications   Medication Dose Route Frequency     acetaminophen  975 mg Oral Q6H PRN     albuterol  2 puff Inhalation Q6H PRN     alum & mag hydroxide-simethicone  30 mL Oral Q4H PRN     diphenhydrAMINE  50 " mg Oral Once PRN    Or     diphenhydrAMINE  50 mg IV/IM Once PRN     EPINEPHrine  0.3 mg Intramuscular Q5 Min PRN     hydrocortisone   Topical BID PRN     hydrOXYzine  25 mg Oral Q4H PRN     melatonin  3 mg Oral At Bedtime PRN     naproxen  250 mg Oral 4x Daily PRN     nicotine  4 mg Buccal Q1H PRN     OLANZapine  5-10 mg Oral TID PRN    Or     OLANZapine  10 mg Intramuscular TID PRN     QUEtiapine  200 mg Oral At Bedtime PRN     Consult:    Dr. Seo, from Medicine curbsided regarding patient's abnormal EKGs. As per Medicine, since the patient is asymptomatic, with normal vitals, only with five higher than normal BP readings from twenty-three, and regular heart rate and rhythm, it seems that EKG may be demonstrating more of a chronic condition. It is recommended for patient to follow-up with PCP and/or Cardiology once discharged. No urgency at this point.    Legal Status:    Das    Committed     SIO:    No    Pt has not required locked seclusion or restraints in the past 24 hours to maintain safety, please refer to RN documentation for further details.    Safety Assessment:   Behavioral Orders   Procedures     Assault precautions     Code 1 - Restrict to Unit     Code 3     Routine Programming     As clinically indicated     Status 15     Every 15 minutes.     Disposition:    Reason for ongoing admission:  Civil commitment pending placement for CD for direct transfer    Discharge location: [facility], pending clinical stabilization, medication optimization, and formulation of a safe discharge plan. Possibly [facility] treatment    Discharge Medications: not ordered    Follow-up Appointments: not scheduled  ____________________________________________________________________  Pertinent Medical diagnoses and management:    R  Ruptured biceps tendon  o Follow-up with surgery in September  ____________________________________________________________________    This patient was seen and evaluated with attending Dr. De Jesus,  who agrees with the assessment and plan.     Frankie Krause MD  Psychiatry Resident Physician, PGY-2  [facility]      Attestation:  This patient has been seen and evaluated by me, Galina De Jesus MD.  I have discussed this patient with the house staff team including the resident and medical student and I agree with the findings and plan in this note.    I have reviewed today's vital signs, medications, labs and imaging. Galina De Jesus MD , PhD.

## 2022-08-25 NOTE — PLAN OF CARE
Patient appears sleeping without concerns, intermittently awake for bathroom use and repositioning, no prn's or snacks given, safety checks are in place, will continue to monitor condition.   Problem: Sleep Disturbance  Goal: Adequate Sleep/Rest  Outcome: Ongoing, Progressing   Goal Outcome Evaluation:

## 2022-08-25 NOTE — PLAN OF CARE
"Assessment/Intervention/Current Symtoms and Care Coordination  The patient's care was discussed with the treatment team and chart notes were reviewed  Patient met with team. Reports he is doing well.  No behavioral issues    Patient interviewed with [facility]- was declined due to their \"not being able to meet patient's mental health needs\"  Patient has been accepted to [facility]- placed on ~ 1 week wait list.       Discharge Plan or Goal  Residential MI/CD treatment  Due to long wait time at CARE - supervisor is now agreeable to a possible residential outpatient program vs CARE.   Psychiatric f/u care in place.     Barriers to Discharge   MI/CD Placement- [facility] - on wait list     Referral Status  None today     Legal Status     Civil commitment with Das order  Patient will require provisional discharge  "

## 2022-08-25 NOTE — PLAN OF CARE
[facility] Occupational Therapy Group Intervention Note     08/25/22 1614   Group Therapy Session   Group Attendance attended group session   Total Time patient participated (minutes) 90   Group Type task skill;psychoeducation   Group Topic Covered coping skills/lifestyle management;relaxation techniques   Group Session Detail 1) clinic - coping skill exploration, creative expression within personally meaningful activities, and observation of social, cognitive, and kinesthetic performance skills    2) Guided discussion and utilization of calming / grounding activities for self regulation. Education was provided on various types of sensory modalities and use within vergara routine.    Patient Response/Contribution cooperative with task;organized;discussed personal experience with topic   Patient Response Detail 1) Calm even demeanor. IND with all projects. No significant change noted.     2) Pt was receptive to information and use of modalities including engagement with canine therapist.      Opal Jaeger OT on 8/25/2022 at 4:15 PM

## 2022-08-25 NOTE — PROGRESS NOTES
Behavioral Health  Note   Behavioral Health  Spirituality Group Note     Unit 20    Name: See Mendenhall    YOB: 1990   MRN: [id number]    Age: 31 year old     Patient attended -led group, which included discussion of spirituality, coping with illness and building resilience.   Patient attended group for 1.0 hrs.   The patient actively participated in group discussion     Raegan [facility]  Staff    Page [phone]

## 2022-08-26 PROCEDURE — 250N000013 HC RX MED GY IP 250 OP 250 PS 637

## 2022-08-26 PROCEDURE — 124N000002 HC R&B MH UMMC

## 2022-08-26 PROCEDURE — 250N000013 HC RX MED GY IP 250 OP 250 PS 637: Performed by: PSYCHIATRY & NEUROLOGY

## 2022-08-26 PROCEDURE — 90853 GROUP PSYCHOTHERAPY: CPT

## 2022-08-26 PROCEDURE — 99231 SBSQ HOSP IP/OBS SF/LOW 25: CPT | Mod: GC | Performed by: PSYCHIATRY & NEUROLOGY

## 2022-08-26 RX ORDER — QUETIAPINE FUMARATE 25 MG/1
25-50 TABLET, FILM COATED ORAL 2 TIMES DAILY PRN
Status: DISCONTINUED | OUTPATIENT
Start: 2022-08-26 | End: 2022-08-29 | Stop reason: HOSPADM

## 2022-08-26 RX ORDER — QUETIAPINE FUMARATE 200 MG/1
200 TABLET, FILM COATED ORAL AT BEDTIME
Status: DISCONTINUED | OUTPATIENT
Start: 2022-08-26 | End: 2022-08-29 | Stop reason: HOSPADM

## 2022-08-26 RX ADMIN — ALBUTEROL SULFATE 2 PUFF: 90 AEROSOL, METERED RESPIRATORY (INHALATION) at 20:37

## 2022-08-26 RX ADMIN — QUETIAPINE FUMARATE 50 MG: 25 TABLET ORAL at 12:55

## 2022-08-26 RX ADMIN — MULTIPLE VITAMINS W/ MINERALS TAB 1 TABLET: TAB at 08:43

## 2022-08-26 RX ADMIN — ACETAMINOPHEN 975 MG: 325 TABLET, FILM COATED ORAL at 10:04

## 2022-08-26 RX ADMIN — QUETIAPINE FUMARATE 50 MG: 25 TABLET ORAL at 20:36

## 2022-08-26 RX ADMIN — LIDOCAINE PATCH 4% 1 PATCH: 40 PATCH TOPICAL at 10:07

## 2022-08-26 RX ADMIN — QUETIAPINE 200 MG: 200 TABLET, FILM COATED ORAL at 21:11

## 2022-08-26 RX ADMIN — OLANZAPINE 10 MG: 5 TABLET, FILM COATED ORAL at 09:51

## 2022-08-26 RX ADMIN — ALBUTEROL SULFATE 2 PUFF: 90 AEROSOL, METERED RESPIRATORY (INHALATION) at 09:55

## 2022-08-26 RX ADMIN — OLANZAPINE 10 MG: 10 TABLET, FILM COATED ORAL at 21:10

## 2022-08-26 RX ADMIN — NAPROXEN 250 MG: 250 TABLET ORAL at 10:02

## 2022-08-26 RX ADMIN — PRAZOSIN HYDROCHLORIDE 1 MG: 1 CAPSULE ORAL at 21:11

## 2022-08-26 RX ADMIN — CETIRIZINE HYDROCHLORIDE 10 MG: 10 TABLET, FILM COATED ORAL at 08:43

## 2022-08-26 RX ADMIN — OLANZAPINE 10 MG: 5 TABLET, FILM COATED ORAL at 18:57

## 2022-08-26 RX ADMIN — NICOTINE POLACRILEX 4 MG: 4 GUM, CHEWING BUCCAL at 09:51

## 2022-08-26 RX ADMIN — BUPRENORPHINE AND NALOXONE 1 FILM: 8; 2 FILM BUCCAL; SUBLINGUAL at 19:07

## 2022-08-26 RX ADMIN — NICOTINE POLACRILEX 4 MG: 4 GUM, CHEWING BUCCAL at 12:22

## 2022-08-26 RX ADMIN — NICOTINE POLACRILEX 4 MG: 4 GUM, CHEWING BUCCAL at 08:43

## 2022-08-26 RX ADMIN — POLYETHYLENE GLYCOL 3350 17 G: 17 POWDER, FOR SOLUTION ORAL at 08:43

## 2022-08-26 RX ADMIN — ACETAMINOPHEN 975 MG: 325 TABLET, FILM COATED ORAL at 18:57

## 2022-08-26 RX ADMIN — MELATONIN TAB 3 MG 3 MG: 3 TAB at 21:10

## 2022-08-26 RX ADMIN — NICOTINE POLACRILEX 4 MG: 4 GUM, CHEWING BUCCAL at 21:05

## 2022-08-26 RX ADMIN — HYDROXYZINE HYDROCHLORIDE 25 MG: 25 TABLET ORAL at 09:54

## 2022-08-26 RX ADMIN — BUPRENORPHINE AND NALOXONE 1 FILM: 8; 2 FILM BUCCAL; SUBLINGUAL at 08:43

## 2022-08-26 RX ADMIN — DESVENLAFAXINE SUCCINATE 50 MG: 50 TABLET, EXTENDED RELEASE ORAL at 08:43

## 2022-08-26 ASSESSMENT — ACTIVITIES OF DAILY LIVING (ADL)
ADLS_ACUITY_SCORE: 29
ORAL_HYGIENE: INDEPENDENT
ADLS_ACUITY_SCORE: 29
HYGIENE/GROOMING: INDEPENDENT
ADLS_ACUITY_SCORE: 29
LAUNDRY: WITH SUPERVISION

## 2022-08-26 NOTE — PLAN OF CARE
"  Problem: Plan of Care - These are the overarching goals to be used throughout the patient stay.    Goal: Plan of Care Review/Shift Note  Outcome: Ongoing, Progressing  Flowsheets (Taken 8/25/2022 2100)  Plan of Care Reviewed With: patient   Goal Outcome Evaluation:    Plan of Care Reviewed With: patient      Pt slept in until dinner time and spent the rest of the shift in the milieu socializing w/peers, watching TV, and ate dinner. Pt has been accepted to \"[facility] and is currently on a 1 week wait list. Denied SI/SIB or hallucinations. Pt took multiple PRNs this shift including Tylenol, Albuterol, Naproxen and Seroquel.  "

## 2022-08-26 NOTE — PLAN OF CARE
Assessment/Intervention/Current Symtoms and Care Coordination  The patient's care was discussed with the treatment team and chart notes were reviewed  Patient met with team. Reports he is doing well but feeling very anxious- has been rumination about his lack of family /social support and his current life situation.    Patient has been accepted to [facility]- placed on ~ 1 week wait list.  VM left for admissions.  Care continues to be closely coordinated with [facility]        Discharge Plan or Goal  Residential MI/CD treatment  Due to long wait time at [facility] - supervisor is now agreeable to a possible residential outpatient program vs CARE.   Psychiatric f/u care in place.     Barriers to Discharge   MI/CD Placement- [facility] - on wait list     Referral Status  None today     Legal Status     Civil commitment with Das order  Patient will require provisional discharge

## 2022-08-26 NOTE — PROGRESS NOTES
"    ----------------------------------------------------------------------------------------------------------  [facility]  Psychiatric Progress Note  Hospital Day #41    See Mendenhall MRN# [id number]   Age: 31 year old YOB: 1990   Date of Admission: 7/14/2022     Subjective   The patient was discussed with the treatment team and chart notes were reviewed.      Identifier: 31 year old male with ADAM     Sleep:  7 hours (08/26/22 0600)   Prescribed Medications: Taken as prescribed  PRN Psychiatric Medications: acetaminophen, albuterol, alum & mag hydroxide-simethicone, hydrocortisone, hydrOXYzine, lidocaine 4%, nicotine, OLANZapine, QUEtiapine    Overnight Nursing Notes/Staff Report:    Pt slept in until dinner time and spent the rest of the shift in the milieu socializing w/peers, watching TV, and ate dinner. Pt has been accepted to \"[facility]\" and is currently on a 1 week wait list. Denied SI/SIB or hallucinations. Pt took multiple PRNs this shift including Tylenol, Albuterol, Naproxen and Seroquel.    No PRN's were given or requested. Pt appeared to have slept 7 hours.    Patient interview:  Patient reports that his night was \"good\" although he did have a period of severe agitation. He said that he wakes up with more anxiety in the morning, referencing lack of support has a factor. He said he took hydroxyzine for these reasons. He does agree that getting out of here will help with these symptoms. Discussed focusing on going day by day and reframing, he has plans for the future and wants to \"use vocational rehab to work in a different field, get a fresh start.\" He expressed wanting support from his family and is hopeful that if he can show a long period of sobriety he can gain back trust. He thinks a treatment center will really help him, one that has a longer stay to get better support. He requested that he has PRN seroquel again to help with agitation.    " "-------------------------------------------------------------------------------------------------------------------------  Suicidal ideation: denies current or recent suicidal ideation or behaviors.  Homicidal ideation: denies current or recent homicidal ideation or behaviors.  Psychotic symptoms: Patient denies AH, VH, paranoia, delusions.     Medication side effects reported: No significant side effects  Acute medical concerns: none     ROS   ROS was negative unless noted above.     Objective   Vitals:  Temp: 98.9  F (37.2  C) (Temp  Min: 98.9  F (37.2  C)  Max: 98.9  F (37.2  C))  Resp: 16 (Resp  Min: 16  Max: 16)  SpO2: 96 % (SpO2  Min: 96 %  Max: 96 %)  Pulse: 78 (Pulse  Min: 78  Max: 78)  BP: 118/74  Systolic (24hrs), Av , Min:118 , Max:118     Diastolic (24hrs), Av, Min:74, Max:74      Mental Status Examination:  Oriented to:  Person/Self, Situation, Date and location  General: Awake and Alert  Appearance:  Appears stated age  Behavior:  calm, cooperative and engaged  Eye Contact:  adequate  Psychomotor:  no abnormal motor symptoms appreciated; no catatonia present  Speech:  appropriate volume/tone, talkative and with good articulation  Language: Fluent in English with appropriate syntax and vocabulary.  Mood:  \"good\"  Affect:  appropriate, congruent with mood and broad/full  Thought Process:  linear, goal directed and perseveration  Thought Content: No SI/HI/AH/VH; No apparent delusions  Associations:  intact  Insight:  fair due to recognizing mental health needs, along with need to go to CD tx   Judgment:  good due to willingness to engage with treatment, manage medications and go to CD tx   Attention Span: adequate for conversation  Concentration:  grossly intact  Recent and Remote Memory:  not formally assessed  Fund of Knowledge: average     Allergies     Allergies   Allergen Reactions     Other Drug Allergy (See Comments)      Fake metal        Labs & Imaging     No results found for this or " any previous visit (from the past 24 hour(s)).       Assessment   Diagnostic Impression:  See Mendenhall is a 31 year old gentleman with a past psychiatric diagnosis of  Bipolar 2 disorder, substance use disorder, depressive disorder and anxiety disorder, PTSD and ADHD. He was admitted from the ER on 07/16/2022 where he was brought by the police due to concern for psychosis and SI with plan. This in the context of methamphetamine and cocaine use, unclear regarding medication non-adherence, he has significant history of substance use and psychosocial stressors including housing instability, unemployment, legal issues, trauma and chronic mental health issues.     He was brought to the ED with SI, erratic and impulsive behaviors and psychosis (paranoid delusions and disorganized behavior/thought process) in the context of methamphetamine and cocaine use.  His last psychiatric hospitalization was in April/2022 at [facility] for psychosis in context of substance use.  He is currently followed by PCP Erinn Maradiaga at [facility]. Current psychosocial stressors include legal issues, trauma, chronic mental health issues, family dynamics and medical issues which he has been coping with by using substances, acting out to self and acting out to others.  Patient's support system includes sister Samaria and friend Wilbert.      Substance use does appear to be playing a contributing role in the patient's presentation. Medical history does not appear to be significant, although chart review indicates history of chronic back pain, prescribed anabolic steroids that may be playing a role in presentation. In utero exposure and developmental delay need to be assessed.       Psychosocially history of trauma in childhood, long history of substance use, currently being unemployed and experiencing housing insecurity, legal issues, not being able to see his daughter represent both precipitating and perpetuating factors  contributing to presentation.      The MSE is notable for some persisting paranoia, mood lability, being guarded, tangentiality and limited attention span/concentration, hyper-talkativeness difficult to interrupt speech. He denies self injurious behaviors. His reported symptoms of VH, paranoid delusions and grossly disorganized thought process in the context of methamphetamine and cocaine use are suggestive of substance-induced psychosis, although definitive diagnosis is still in evolution and further collateral information from family/ outpatient provider is needed at this time; differential includes primary psychotic disorder exacerbated by substance use, PTSD, Bipolar Disorder, schizoaffective disorder.  Additionally, he has traits of impulsive behaviors which interfere with his ability to adhere with the treatment plan.       Optimization of medications to target these symptom clusters in addition to evaluation of adequate community supports will be targets for treatment during this admission.      Pt has history of previous attempts and substance use representing heightened acute risk for self/others patient warrants inpatient psychiatric hospitalization to maintain his safety until door to door treatment can be arranged. Disposition pending clinical stabilization, medication optimization and development of an appropriate discharge plan, including CD treatment referral upon discharge.     Principal Diagnoses:   Substance use disorder (stimulant, opioid, sedative hypnotic)    Secondary Diagnoses:   Substance induced psychosis, now resolved  Major depressive disorder, recurrent episode, moderate   Generalized anxiety disorder    Psychiatric & Medical course:  See Mendenhall was [facility] to [facility] on a 72 hour hold, transferred to [facility] following a conflict with another patient. Patient in question has been removed from [facility], so now Mr. Mendenhall has returned to [facility]. His PTA prazosin was  continued.      See Mendenhall continued to meet criteria for inpatient hospitalization medication optimization, inpatient stabilization, and appropriate discharge planning.      7/19: added Seroquel 50 mg BEDTIME; 25 mg TID PRN   7/20: Quetiapine (Seroquel) increased to 100 mg, BEDTIME scheduled. Keep 25 mg TID PRN  7/20: melatonin 3mg scheduled per patient request  7/25: Pending confirmation of CARE referral and status on list with outpatient CM. If anticipated to be a prolonged wait for placement, will consider less restrictive options for CD.  7/26: Ongoing issues with outpatient CM Agatha not returning messages or answering calls from team or patient. Message left with CM supervisor given persistent issues reaching CM.  7/27: OP CM strongly advocating for CARE placement given patient's history of several failed treatments and violent/aggressive behaviors in context of substance use. OP CM did confirm that she placed CARE referral. Pt continues to voice frustration about length of stay and delay in CARE referral as he was informed that the referral was placed over one week ago.   7/28: Returned to [facility] from [facility]. CARE placement planned, likely will be available within two weeks. Mr. Mendenhall requests audience with  (Agatha) to explain circumstances of relapse and admission, even if it will not change the course of treatment or discharge to CARE facility, still wishes to clarify details. Changed scheduled SEROQUEL to ZYPREXA 10 mg at bedtime. SEROQUEL ordered PRN  7/29: CARE placement planned, but Mr. Mendenhall continues to request audience with  (Agatha). Should have been placed on list for CARE facility previously as he is on hospital day 13. Will ensure this is done today.  scheduled to visit unit and speak with Mr. Mendenhall on 8/1. Persistent anxiety, request for valium denied due to CD history and need to maintain CARE eligibility. Increased PRN ZYPREXA to  "accommodate maximum total dose of 40 mg/day (along with scheduled). Complains of shoulder pain s/p rotator cuff surgery and biceps tear in 12/2021, request for tramadol denied due to CD history. Ordered PRN naproxen and PRN lidocaine cream instead.  8/1: Seasonal allergies; ordered PRN Cetirizine 10 mg   8/2: patient requested testosterone supplementation; ordered serum testosterone and SHBG  8/3: Added desvenlafaxine 25 mg daily and added PRN melatonin 3 mg to existing scheduled melatonin 3 mg QPM; Testosterone labs pending.  8/8: Testosterone  & Sex Hormone Binding Globulin levels within normal range; results shared with patient.  8/10: increased desvenlafaxine to 50 mg daily, changed lidocaine gel to patch for biceps pain  8/12: IPCD consult for suboxone and Code 3 exception : \"Please consider exception to patient activities off of secure unit policy (Code 3). Patient would be escorted by security.\"  8/15: Increased PRN Tylenol and Naproxen  8/17: Started suboxone 2-0.5 mg today for two doses; increase according to Dr. Roldan's consult note  8/25: Increased quetiapine at bedtime to 200 mg    8/26: scheduled quetiapine 200mg at bedtime and added PRN quetiapine 25-50. Accepted to CD treatment center.      Plan   Today's Changes (PRN medications):     Scheduled bedtime quetiapine 200mg    Added back PRN quetiapine 25-50mg   _______________________________________________________________________  Psychiatric Management:    Civil commitment with Das    Additional Planning:    Continue to monitor for and stabilize: irritable, psychosis and substance use    Patient will be treated in therapeutic milieu with appropriate individual and group therapies as described.    Scheduled Medications (summary):  Current Facility-Administered Medications   Medication Dose Route Frequency     buprenorphine HCl-naloxone HCl  1 Film Sublingual BID     cetirizine  10 mg Oral Daily     desvenlafaxine  50 mg Oral Daily     lidocaine  1 " patch Transdermal Q24H     lidocaine   Transdermal Q8H ALBA     melatonin  3 mg Oral At Bedtime     multivitamin w/minerals  1 tablet Oral Daily     OLANZapine  10 mg Oral At Bedtime     polyethylene glycol  17 g Oral Daily     prazosin  1 mg Oral At Bedtime     PRN Medications (summary):  Current Facility-Administered Medications   Medication Dose Route Frequency     acetaminophen  975 mg Oral Q6H PRN     albuterol  2 puff Inhalation Q6H PRN     alum & mag hydroxide-simethicone  30 mL Oral Q4H PRN     diphenhydrAMINE  50 mg Oral Once PRN    Or     diphenhydrAMINE  50 mg IV/IM Once PRN     EPINEPHrine  0.3 mg Intramuscular Q5 Min PRN     hydrocortisone   Topical BID PRN     hydrOXYzine  25 mg Oral Q4H PRN     melatonin  3 mg Oral At Bedtime PRN     naproxen  250 mg Oral 4x Daily PRN     nicotine  4 mg Buccal Q1H PRN     OLANZapine  5-10 mg Oral TID PRN    Or     OLANZapine  10 mg Intramuscular TID PRN     QUEtiapine  200 mg Oral At Bedtime PRN     Consult:    Dr. Seo, from Medicine curbsided regarding patient's abnormal EKGs. As per Medicine, since the patient is asymptomatic, with normal vitals, only with five higher than normal BP readings from twenty-three, and regular heart rate and rhythm, it seems that EKG may be demonstrating more of a chronic condition. It is recommended for patient to follow-up with PCP and/or Cardiology once discharged. No urgency at this point.    Legal Status:    [facility]    Committed     SIO:    No    Pt has not required locked seclusion or restraints in the past 24 hours to maintain safety, please refer to RN documentation for further details.    Safety Assessment:   Behavioral Orders   Procedures     Assault precautions     Code 1 - Restrict to Unit     Code 3     Routine Programming     As clinically indicated     Status 15     Every 15 minutes.     Disposition:    Reason for ongoing admission:  Civil commitment pending placement for CD for direct transfer    Discharge [facility]: [facility],  pending clinical stabilization, medication optimization, and formulation of a safe discharge plan. Possibly [facility] treatment    Discharge Medications: not ordered    Follow-up Appointments: not scheduled  ____________________________________________________________________  Pertinent Medical diagnoses and management:    R  Ruptured biceps tendon  o Follow-up with surgery in September  ____________________________________________________________________    This patient was seen and evaluated with attending Dr. De Jesus, who agrees with the assessment and plan.       Josi Smith MD  PGY1 Psychiatry Resident     Attestation:  This patient has been seen and evaluated by me, Galina De Jesus MD.  I have discussed this patient with the house staff team including the resident and medical student and I agree with the findings and plan in this note.    I have reviewed today's vital signs, medications, labs and imaging. Galina De Jesus MD , PhD.

## 2022-08-26 NOTE — PLAN OF CARE
Problem: Plan of Care - These are the overarching goals to be used throughout the patient stay.    Goal: Plan of Care Review/Shift Note  Description: The Plan of Care Review/Shift note should be completed every shift.  The Outcome Evaluation is a brief statement about your assessment that the patient is improving, declining, or no change.  This information will be displayed automatically on your shift note.  Outcome: Ongoing, Progressing  Flowsheets (Taken 8/26/2022 0956)  Plan of Care Reviewed With: patient  Overall Patient Progress: improving  Goal: Absence of Hospital-Acquired Illness or Injury  Outcome: Ongoing, Progressing     Problem: Behavioral Health Plan of Care  Goal: Plan of Care Review  Outcome: Ongoing, Progressing  Flowsheets (Taken 8/26/2022 0956)  Plan of Care Reviewed With: patient  Overall Patient Progress: improving  Goal: Adheres to Safety Considerations for Self and Others  Outcome: Ongoing, Progressing  Flowsheets (Taken 8/26/2022 0956)  Adheres to Safety Considerations for Self and Others: making progress toward outcome  Goal: Optimized Coping Skills in Response to Life Stressors  Outcome: Ongoing, Progressing  Flowsheets (Taken 8/26/2022 0956)  Optimized Coping Skills in Response to Life Stressors: making progress toward outcome  Goal: Develops/Participates in Therapeutic [place] to Support Successful Transition  Outcome: Ongoing, Progressing  Flowsheets (Taken 8/26/2022 0956)  Develops/Participates in Therapeutic [place] to Support Successful Transition: making progress toward outcome   Goal Outcome Evaluation:    Plan of Care Reviewed With: patient     Overall Patient Progress: improving  Patient has been visible in the milieu,by Dining area coloring to keep himself calm.Rates anxiety @ 8/10 and depression 5/10.PRN Zyprexa and Hydroxyzine given with relief.Patient has been accepted @ [facility].  Vital signs:  Temp: 97.5  F (36.4  C) Temp src: Oral BP: 118/74 Pulse: 78   Resp: 18 SpO2:  "95 % O2 Device: None (Room air)   Height: 180.3 cm (5' 11\") Weight: 104.2 kg (229 lb 11.2 oz)  Estimated body mass index is 32.04 kg/m  as calculated from the following:    Height as of this encounter: 1.803 m (5' 11\").    Weight as of this encounter: 104.2 kg (229 lb 11.2 oz).               "

## 2022-08-26 NOTE — PROGRESS NOTES
08/26/22 1115   Group Therapy Session   Group Attendance attended group session   Time Session Began 1115   Time Session Ended 1215   Total Time patient participated (minutes) 60   Total # Attendees 5   Group Type psychotherapeutic   Group Topic Covered coping skills/lifestyle management   Group Session Detail Pt was able to verbally share within structured timelines and provide useful feedback for peers as well as relate to issues peers raised in their sharing   Patient Response/Contribution able to recall/repeat info presented;cooperative with task;expressed readiness to alter behaviors;expressed understanding of topic;listened actively;organized   Patient Response Detail engaged

## 2022-08-26 NOTE — PLAN OF CARE
Problem: Sleep Disturbance  Goal: Adequate Sleep/Rest  Outcome: Ongoing, Progressing   Goal Outcome Evaluation:    No PRN's were given or requested. Pt appeared to have slept 7 hours.

## 2022-08-27 PROCEDURE — 250N000013 HC RX MED GY IP 250 OP 250 PS 637: Performed by: PSYCHIATRY & NEUROLOGY

## 2022-08-27 PROCEDURE — 124N000002 HC R&B MH UMMC

## 2022-08-27 PROCEDURE — 250N000013 HC RX MED GY IP 250 OP 250 PS 637

## 2022-08-27 RX ADMIN — LIDOCAINE PATCH 4% 1 PATCH: 40 PATCH TOPICAL at 09:43

## 2022-08-27 RX ADMIN — NICOTINE POLACRILEX 4 MG: 4 GUM, CHEWING BUCCAL at 11:27

## 2022-08-27 RX ADMIN — ACETAMINOPHEN 975 MG: 325 TABLET, FILM COATED ORAL at 09:35

## 2022-08-27 RX ADMIN — OLANZAPINE 10 MG: 5 TABLET, FILM COATED ORAL at 12:28

## 2022-08-27 RX ADMIN — QUETIAPINE FUMARATE 50 MG: 25 TABLET ORAL at 11:27

## 2022-08-27 RX ADMIN — PRAZOSIN HYDROCHLORIDE 1 MG: 1 CAPSULE ORAL at 20:10

## 2022-08-27 RX ADMIN — ALBUTEROL SULFATE 2 PUFF: 90 AEROSOL, METERED RESPIRATORY (INHALATION) at 04:10

## 2022-08-27 RX ADMIN — MULTIPLE VITAMINS W/ MINERALS TAB 1 TABLET: TAB at 09:35

## 2022-08-27 RX ADMIN — NAPROXEN 250 MG: 250 TABLET ORAL at 20:10

## 2022-08-27 RX ADMIN — NICOTINE POLACRILEX 4 MG: 4 GUM, CHEWING BUCCAL at 09:37

## 2022-08-27 RX ADMIN — NICOTINE POLACRILEX 4 MG: 4 GUM, CHEWING BUCCAL at 19:20

## 2022-08-27 RX ADMIN — OLANZAPINE 10 MG: 5 TABLET, FILM COATED ORAL at 04:06

## 2022-08-27 RX ADMIN — BUPRENORPHINE AND NALOXONE 1 FILM: 8; 2 FILM BUCCAL; SUBLINGUAL at 19:20

## 2022-08-27 RX ADMIN — QUETIAPINE 200 MG: 200 TABLET, FILM COATED ORAL at 20:09

## 2022-08-27 RX ADMIN — NICOTINE POLACRILEX 4 MG: 4 GUM, CHEWING BUCCAL at 12:29

## 2022-08-27 RX ADMIN — MELATONIN TAB 3 MG 3 MG: 3 TAB at 20:09

## 2022-08-27 RX ADMIN — ALBUTEROL SULFATE 2 PUFF: 90 AEROSOL, METERED RESPIRATORY (INHALATION) at 20:26

## 2022-08-27 RX ADMIN — OLANZAPINE 10 MG: 10 TABLET, FILM COATED ORAL at 19:20

## 2022-08-27 RX ADMIN — ACETAMINOPHEN 975 MG: 325 TABLET, FILM COATED ORAL at 04:09

## 2022-08-27 RX ADMIN — POLYETHYLENE GLYCOL 3350 17 G: 17 POWDER, FOR SOLUTION ORAL at 09:43

## 2022-08-27 RX ADMIN — NICOTINE POLACRILEX 4 MG: 4 GUM, CHEWING BUCCAL at 20:26

## 2022-08-27 RX ADMIN — DESVENLAFAXINE SUCCINATE 50 MG: 50 TABLET, EXTENDED RELEASE ORAL at 09:35

## 2022-08-27 RX ADMIN — BUPRENORPHINE AND NALOXONE 1 FILM: 8; 2 FILM BUCCAL; SUBLINGUAL at 09:36

## 2022-08-27 RX ADMIN — CETIRIZINE HYDROCHLORIDE 10 MG: 10 TABLET, FILM COATED ORAL at 09:35

## 2022-08-27 ASSESSMENT — ACTIVITIES OF DAILY LIVING (ADL)
ORAL_HYGIENE: INDEPENDENT
DRESS: INDEPENDENT
ADLS_ACUITY_SCORE: 29
LAUNDRY: WITH SUPERVISION
ADLS_ACUITY_SCORE: 29
HYGIENE/GROOMING: INDEPENDENT
ADLS_ACUITY_SCORE: 29

## 2022-08-27 NOTE — PLAN OF CARE
Problem: Plan of Care - These are the overarching goals to be used throughout the patient stay.    Goal: Absence of Hospital-Acquired Illness or Injury  Outcome: Ongoing, Progressing     Problem: Plan of Care - These are the overarching goals to be used throughout the patient stay.    Goal: Absence of Hospital-Acquired Illness or Injury  Intervention: Prevent Skin Injury  Recent Flowsheet Documentation  Taken 8/26/2022 1839 by Ede Mcnair, RN  Body Position: position changed independently     Problem: Suicidal Behavior  Goal: Suicidal Behavior is Absent or Managed  Outcome: Ongoing, Progressing  Flowsheets (Taken 8/26/2022 1910)  Mutually Determined Action Steps (Suicidal Behavior Absent/Managed): sets future-oriented goal     Problem: Behavioral Health Plan of Care  Goal: Adheres to Safety Considerations for Self and Others  Intervention: Develop and Maintain Individualized Safety Plan  Recent Flowsheet Documentation  Taken 8/26/2022 1839 by Ede Mcnair, RN  Safety Measures: environmental rounds completed   Goal Outcome Evaluation:    Plan of Care Reviewed With: patient     In milieu at supper time, presented with irritability due to room search while he was asleep. Requested for PRN Zyprexa then seroquel for increased anxiety rated at 8, depression 8, denied SI/HI. Calm and cooperative, and compliant with medications. Upper right arm pain rated at 10, appeared in no distress. Social and interactive with peers and staff members.

## 2022-08-27 NOTE — PLAN OF CARE
"Pt denies SI.  Pt has mad no comments to about people taking his papers.  Pt on phone, eating, went to afternoon group.    Problem: Plan of Care - These are the overarching goals to be used throughout the patient stay.    Goal: Plan of Care Review/Shift Note  Description: The Plan of Care Review/Shift note should be completed every shift.  The Outcome Evaluation is a brief statement about your assessment that the patient is improving, declining, or no change.  This information will be displayed automatically on your shift note.  Outcome: Ongoing, Not Progressing  Goal: Patient-Specific Goal (Individualized)  Description: You can add care plan individualizations to a care plan. Examples of Individualization might be:  \"Parent requests to be called daily at 9am for status\", \"I have a hard time hearing out of my right ear\", or \"Do not touch me to wake me up as it startles me\".  Outcome: Ongoing, Not Progressing  Goal: Absence of Hospital-Acquired Illness or Injury  Outcome: Ongoing, Not Progressing  Goal: Optimal Comfort and Wellbeing  Outcome: Ongoing, Not Progressing  Intervention: Monitor Pain and Promote Comfort  Recent Flowsheet Documentation  Taken 8/27/2022 0944 by Ashley Smyth, RN  Pain Management Interventions: medication (see MAR)  Goal: Readiness for Transition of Care  Outcome: Ongoing, Not Progressing   Goal Outcome Evaluation:                      "

## 2022-08-27 NOTE — PLAN OF CARE
Problem: Sleep Disturbance  Goal: Adequate Sleep/Rest  Outcome: Ongoing, Progressing    Pt appeared to have slept for 5.75 hours. At 0409, PRN tylenol 975 mg was given for upper right arm pain rated at 5, PRN zyprexa was given for agitation and PRN albuterol inhaler was given for wheezing. PRN medication administration were effective, as pt was able to relax and sleep after administration, and no further complain of pain or discomfort during shift. Prior to PRN medication administration, pt's complained that his roommate has been going through his belongings in his room, and trying to talk to him while he is sleeping, opening and closing the room doors. Staff redirected pt's roommate and redirection was effective. Pt also, said that his roommate had told the doctors that he ( Pt's roommate) has been going through his things in the room. Pt said he doesn't want to room with his current roommate any more. Staff easily redirected the pt and told him  that morning shift will be updated of the situation; redirection was effective.  15 minutes safety check was in place.  Staff will continue to offer support to pt.

## 2022-08-28 PROCEDURE — 250N000013 HC RX MED GY IP 250 OP 250 PS 637

## 2022-08-28 PROCEDURE — 250N000013 HC RX MED GY IP 250 OP 250 PS 637: Performed by: PSYCHIATRY & NEUROLOGY

## 2022-08-28 PROCEDURE — 124N000002 HC R&B MH UMMC

## 2022-08-28 RX ADMIN — ALBUTEROL SULFATE 2 PUFF: 90 AEROSOL, METERED RESPIRATORY (INHALATION) at 20:57

## 2022-08-28 RX ADMIN — MELATONIN TAB 3 MG 3 MG: 3 TAB at 20:25

## 2022-08-28 RX ADMIN — NICOTINE POLACRILEX 4 MG: 4 GUM, CHEWING BUCCAL at 12:08

## 2022-08-28 RX ADMIN — BUPRENORPHINE AND NALOXONE 1 FILM: 8; 2 FILM BUCCAL; SUBLINGUAL at 19:02

## 2022-08-28 RX ADMIN — OLANZAPINE 10 MG: 5 TABLET, FILM COATED ORAL at 18:39

## 2022-08-28 RX ADMIN — NICOTINE POLACRILEX 4 MG: 4 GUM, CHEWING BUCCAL at 20:25

## 2022-08-28 RX ADMIN — CETIRIZINE HYDROCHLORIDE 10 MG: 10 TABLET, FILM COATED ORAL at 09:13

## 2022-08-28 RX ADMIN — BUPRENORPHINE AND NALOXONE 1 FILM: 8; 2 FILM BUCCAL; SUBLINGUAL at 09:13

## 2022-08-28 RX ADMIN — OLANZAPINE 10 MG: 5 TABLET, FILM COATED ORAL at 09:15

## 2022-08-28 RX ADMIN — NICOTINE POLACRILEX 4 MG: 4 GUM, CHEWING BUCCAL at 09:13

## 2022-08-28 RX ADMIN — NAPROXEN 250 MG: 250 TABLET ORAL at 20:57

## 2022-08-28 RX ADMIN — QUETIAPINE 200 MG: 200 TABLET, FILM COATED ORAL at 20:24

## 2022-08-28 RX ADMIN — POLYETHYLENE GLYCOL 3350 17 G: 17 POWDER, FOR SOLUTION ORAL at 09:19

## 2022-08-28 RX ADMIN — ALBUTEROL SULFATE 2 PUFF: 90 AEROSOL, METERED RESPIRATORY (INHALATION) at 10:06

## 2022-08-28 RX ADMIN — DESVENLAFAXINE SUCCINATE 50 MG: 50 TABLET, EXTENDED RELEASE ORAL at 09:13

## 2022-08-28 RX ADMIN — ACETAMINOPHEN 975 MG: 325 TABLET, FILM COATED ORAL at 09:12

## 2022-08-28 RX ADMIN — OLANZAPINE 10 MG: 10 TABLET, FILM COATED ORAL at 20:24

## 2022-08-28 RX ADMIN — PRAZOSIN HYDROCHLORIDE 1 MG: 1 CAPSULE ORAL at 20:25

## 2022-08-28 RX ADMIN — MULTIPLE VITAMINS W/ MINERALS TAB 1 TABLET: TAB at 09:13

## 2022-08-28 RX ADMIN — LIDOCAINE PATCH 4% 1 PATCH: 40 PATCH TOPICAL at 10:07

## 2022-08-28 RX ADMIN — NAPROXEN 250 MG: 250 TABLET ORAL at 09:16

## 2022-08-28 RX ADMIN — ACETAMINOPHEN 975 MG: 325 TABLET, FILM COATED ORAL at 19:05

## 2022-08-28 RX ADMIN — QUETIAPINE FUMARATE 50 MG: 25 TABLET ORAL at 10:04

## 2022-08-28 ASSESSMENT — ACTIVITIES OF DAILY LIVING (ADL)
ORAL_HYGIENE: INDEPENDENT
ADLS_ACUITY_SCORE: 29
ADLS_ACUITY_SCORE: 29
HYGIENE/GROOMING: INDEPENDENT;HANDWASHING
DRESS: INDEPENDENT
ADLS_ACUITY_SCORE: 29
LAUNDRY: WITH SUPERVISION
ADLS_ACUITY_SCORE: 29
ADLS_ACUITY_SCORE: 29
HYGIENE/GROOMING: INDEPENDENT
ADLS_ACUITY_SCORE: 29

## 2022-08-28 NOTE — PLAN OF CARE
"Pt denies SI. Pt utilizing a lot of Seroquel and Zyprexa PRN's pt states he has racing thoughts and his aunt is coming from [place] soon.  Pt states PRN's help.  Pt eating meals, on phone watching TV.  P{t been keeping to self.    Problem: Plan of Care - These are the overarching goals to be used throughout the patient stay.    Goal: Plan of Care Review/Shift Note  Description: The Plan of Care Review/Shift note should be completed every shift.  The Outcome Evaluation is a brief statement about your assessment that the patient is improving, declining, or no change.  This information will be displayed automatically on your shift note.  Outcome: Ongoing, Not Progressing  Goal: Patient-Specific Goal (Individualized)  Description: You can add care plan individualizations to a care plan. Examples of Individualization might be:  \"Parent requests to be called daily at 9am for status\", \"I have a hard time hearing out of my right ear\", or \"Do not touch me to wake me up as it startles me\".  Outcome: Ongoing, Not Progressing  Goal: Absence of Hospital-Acquired Illness or Injury  Outcome: Ongoing, Not Progressing  Goal: Optimal Comfort and Wellbeing  Outcome: Ongoing, Not Progressing  Goal: Readiness for Transition of Care  Outcome: Ongoing, Not Progressing     Problem: Behavioral Health Plan of Care  Goal: Optimal Comfort and Wellbeing  Outcome: Ongoing, Not Progressing  Goal: Plan of Care Review  Outcome: Ongoing, Not Progressing  Goal: Patient-Specific Goal (Individualization)  Outcome: Ongoing, Not Progressing  Goal: Adheres to Safety Considerations for Self and Others  Outcome: Ongoing, Not Progressing  Intervention: Develop and Maintain Individualized Safety Plan  Recent Flowsheet Documentation  Taken 8/28/2022 1100 by Ashley Smyth RN  Safety Measures:   environmental rounds completed   safety rounds completed  Goal: Absence of New-Onset Illness or Injury  Outcome: Ongoing, Not Progressing  Intervention: Identify and " Manage Fall Risk  Recent Flowsheet Documentation  Taken 8/28/2022 1100 by Ashley Smyth RN  Safety Measures:   environmental rounds completed   safety rounds completed  Goal: Optimized Coping Skills in Response to Life Stressors  Outcome: Ongoing, Not Progressing  Goal: Develops/Participates in Therapeutic Echo to Support Successful Transition  Outcome: Ongoing, Not Progressing  Goal: Team Discussion  Outcome: Ongoing, Not Progressing   Goal Outcome Evaluation:

## 2022-08-28 NOTE — PLAN OF CARE
Problem: Sleep Disturbance  Goal: Adequate Sleep/Rest  Outcome: Ongoing, Progressing       Pt appeared to have slept for  6.5 hours. No complain of pain or discomfort and no PRN medication was given. 15 minutes safety check was in place. Staff will continue to offer support to pt.

## 2022-08-28 NOTE — PLAN OF CARE
Problem: Plan of Care - These are the overarching goals to be used throughout the patient stay.    Goal: Absence of Hospital-Acquired Illness or Injury  Intervention: Prevent Skin Injury  Recent Flowsheet Documentation  Taken 8/27/2022 2010 by Ede Mcnair RN  Body Position: position changed independently     Problem: Plan of Care - These are the overarching goals to be used throughout the patient stay.    Goal: Absence of Hospital-Acquired Illness or Injury  Intervention: Prevent and Manage VTE (Venous Thromboembolism) Risk  Recent Flowsheet Documentation  Taken 8/27/2022 2010 by Ede Mcnair, RN  Activity Management:   ambulated in [place]   ambulated in room   ambulated to bathroom     Problem: Plan of Care - These are the overarching goals to be used throughout the patient stay.    Goal: Optimal Comfort and Wellbeing  Intervention: Monitor Pain and Promote Comfort  Recent Flowsheet Documentation  Taken 8/27/2022 2010 by Ede Mcnair, RN  Pain Management Interventions: medication (see MAR)     Problem: Suicidal Behavior  Goal: Suicidal Behavior is Absent or Managed  Outcome: Ongoing, Progressing  Flowsheets (Taken 8/27/2022 2314)  Mutually Determined Action Steps (Suicidal Behavior Absent/Managed): sets future-oriented goal   Goal Outcome Evaluation:    Plan of Care Reviewed With: patient     Patient was visible in milieu, pleasant, cooperative and compliant with medication. Patient was social and interactive with peers and staff members. Anxiety and depression 5, no SI. Right upper arm managed with PRN Naproxen.

## 2022-08-29 VITALS
OXYGEN SATURATION: 95 % | HEART RATE: 73 BPM | HEIGHT: 71 IN | SYSTOLIC BLOOD PRESSURE: 115 MMHG | RESPIRATION RATE: 16 BRPM | TEMPERATURE: 97.9 F | WEIGHT: 228.1 LBS | DIASTOLIC BLOOD PRESSURE: 78 MMHG | BODY MASS INDEX: 31.93 KG/M2

## 2022-08-29 PROCEDURE — 99238 HOSP IP/OBS DSCHRG MGMT 30/<: CPT | Mod: GC | Performed by: PSYCHIATRY & NEUROLOGY

## 2022-08-29 PROCEDURE — 250N000013 HC RX MED GY IP 250 OP 250 PS 637: Performed by: PSYCHIATRY & NEUROLOGY

## 2022-08-29 PROCEDURE — 250N000013 HC RX MED GY IP 250 OP 250 PS 637

## 2022-08-29 RX ORDER — QUETIAPINE FUMARATE 200 MG/1
200 TABLET, FILM COATED ORAL AT BEDTIME
Qty: 30 TABLET | Refills: 0 | Status: SHIPPED | OUTPATIENT
Start: 2022-08-29 | End: 2022-09-28

## 2022-08-29 RX ORDER — QUETIAPINE FUMARATE 25 MG/1
25-50 TABLET, FILM COATED ORAL DAILY PRN
Qty: 60 TABLET | Refills: 0 | Status: SHIPPED | OUTPATIENT
Start: 2022-08-29

## 2022-08-29 RX ORDER — PRAZOSIN HYDROCHLORIDE 1 MG/1
1 CAPSULE ORAL AT BEDTIME
Qty: 30 CAPSULE | Refills: 0 | Status: SHIPPED | OUTPATIENT
Start: 2022-08-29 | End: 2022-09-28

## 2022-08-29 RX ORDER — LANOLIN ALCOHOL/MO/W.PET/CERES
3 CREAM (GRAM) TOPICAL AT BEDTIME
Qty: 30 TABLET | Refills: 0 | Status: SHIPPED | OUTPATIENT
Start: 2022-08-29 | End: 2022-09-28

## 2022-08-29 RX ORDER — NAPROXEN 250 MG/1
250 TABLET ORAL 4 TIMES DAILY PRN
Qty: 20 TABLET | Refills: 0 | Status: SHIPPED | OUTPATIENT
Start: 2022-08-29

## 2022-08-29 RX ORDER — DESVENLAFAXINE 50 MG/1
50 TABLET, FILM COATED, EXTENDED RELEASE ORAL DAILY
Qty: 30 TABLET | Refills: 0 | Status: SHIPPED | OUTPATIENT
Start: 2022-08-30 | End: 2022-09-29

## 2022-08-29 RX ORDER — ACETAMINOPHEN 325 MG/1
975 TABLET ORAL EVERY 6 HOURS PRN
Qty: 45 TABLET | Refills: 0 | Status: SHIPPED | OUTPATIENT
Start: 2022-08-29

## 2022-08-29 RX ORDER — MAGNESIUM HYDROXIDE/ALUMINUM HYDROXICE/SIMETHICONE 120; 1200; 1200 MG/30ML; MG/30ML; MG/30ML
30 SUSPENSION ORAL EVERY 4 HOURS PRN
Qty: 355 ML | Refills: 0 | Status: SHIPPED | OUTPATIENT
Start: 2022-08-29

## 2022-08-29 RX ORDER — OLANZAPINE 5 MG/1
5-10 TABLET ORAL 2 TIMES DAILY PRN
Qty: 60 TABLET | Refills: 0 | Status: SHIPPED | OUTPATIENT
Start: 2022-08-29

## 2022-08-29 RX ORDER — MULTIPLE VITAMINS W/ MINERALS TAB 9MG-400MCG
1 TAB ORAL DAILY
Qty: 30 TABLET | Refills: 0 | Status: SHIPPED | OUTPATIENT
Start: 2022-08-30 | End: 2022-09-29

## 2022-08-29 RX ORDER — OLANZAPINE 10 MG/1
10 TABLET ORAL AT BEDTIME
Qty: 30 TABLET | Refills: 0 | Status: SHIPPED | OUTPATIENT
Start: 2022-08-29 | End: 2022-09-28

## 2022-08-29 RX ORDER — CETIRIZINE HYDROCHLORIDE 10 MG/1
10 TABLET ORAL DAILY
Qty: 30 TABLET | Refills: 0 | Status: SHIPPED | OUTPATIENT
Start: 2022-08-30 | End: 2022-09-29

## 2022-08-29 RX ORDER — ALBUTEROL SULFATE 90 UG/1
2 AEROSOL, METERED RESPIRATORY (INHALATION) EVERY 6 HOURS PRN
Qty: 18 G | Refills: 0 | Status: SHIPPED | OUTPATIENT
Start: 2022-08-29

## 2022-08-29 RX ORDER — BUPRENORPHINE AND NALOXONE 8; 2 MG/1; MG/1
1 FILM, SOLUBLE BUCCAL; SUBLINGUAL 2 TIMES DAILY
Qty: 60 FILM | Refills: 0 | Status: SHIPPED | OUTPATIENT
Start: 2022-08-29 | End: 2022-09-28

## 2022-08-29 RX ORDER — POLYETHYLENE GLYCOL 3350 17 G/17G
17 POWDER, FOR SOLUTION ORAL DAILY
Qty: 507 G | Refills: 0 | Status: SHIPPED | OUTPATIENT
Start: 2022-08-29

## 2022-08-29 RX ORDER — LIDOCAINE 4 G/G
1 PATCH TOPICAL EVERY 24 HOURS
Qty: 30 PATCH | Refills: 0 | Status: SHIPPED | OUTPATIENT
Start: 2022-08-29 | End: 2022-09-28

## 2022-08-29 RX ADMIN — BUPRENORPHINE AND NALOXONE 1 FILM: 8; 2 FILM BUCCAL; SUBLINGUAL at 08:21

## 2022-08-29 RX ADMIN — LIDOCAINE PATCH 4% 1 PATCH: 40 PATCH TOPICAL at 08:21

## 2022-08-29 RX ADMIN — OLANZAPINE 10 MG: 5 TABLET, FILM COATED ORAL at 08:22

## 2022-08-29 RX ADMIN — DESVENLAFAXINE SUCCINATE 50 MG: 50 TABLET, EXTENDED RELEASE ORAL at 08:21

## 2022-08-29 RX ADMIN — ALBUTEROL SULFATE 2 PUFF: 90 AEROSOL, METERED RESPIRATORY (INHALATION) at 11:23

## 2022-08-29 RX ADMIN — QUETIAPINE FUMARATE 50 MG: 25 TABLET ORAL at 11:22

## 2022-08-29 RX ADMIN — ACETAMINOPHEN 975 MG: 325 TABLET, FILM COATED ORAL at 08:22

## 2022-08-29 RX ADMIN — POLYETHYLENE GLYCOL 3350 17 G: 17 POWDER, FOR SOLUTION ORAL at 08:21

## 2022-08-29 RX ADMIN — NICOTINE POLACRILEX 4 MG: 4 GUM, CHEWING BUCCAL at 10:33

## 2022-08-29 RX ADMIN — OLANZAPINE 10 MG: 5 TABLET, FILM COATED ORAL at 13:52

## 2022-08-29 RX ADMIN — CETIRIZINE HYDROCHLORIDE 10 MG: 10 TABLET, FILM COATED ORAL at 08:21

## 2022-08-29 RX ADMIN — NICOTINE POLACRILEX 4 MG: 4 GUM, CHEWING BUCCAL at 08:21

## 2022-08-29 RX ADMIN — MULTIPLE VITAMINS W/ MINERALS TAB 1 TABLET: TAB at 08:21

## 2022-08-29 ASSESSMENT — ACTIVITIES OF DAILY LIVING (ADL)
ADLS_ACUITY_SCORE: 29

## 2022-08-29 NOTE — PLAN OF CARE
Problem: Behavioral Health Plan of Care  Goal: Adheres to Safety Considerations for Self and Others  Outcome: Ongoing, Progressing  Flowsheets (Taken 8/28/2022 2046)  Adheres to Safety Considerations for Self and Others: making progress toward outcome  Intervention: Develop and Maintain Individualized Safety Plan  Recent Flowsheet Documentation  Taken 8/28/2022 1905 by Ede Mcnair RN  Safety Measures:    environmental rounds completed    safety rounds completed     Problem: Suicidal Behavior  Goal: Suicidal Behavior is Absent or Managed  Outcome: Ongoing, Progressing  Flowsheets (Taken 8/28/2022 2046)  Mutually Determined Action Steps (Suicidal Behavior Absent/Managed): sets future-oriented goal   Goal Outcome Evaluation:    Plan of Care Reviewed With: patient     Right upper arm pain managed with PRN Tylenol and Naproxen. Anxiety rated at 8, managed with PRN Zyprexa, depression rated at 6, denied SI. Patient was visible in milieu, social and engaged with peers and staff members. He was pleasant, cooperative, and compliant with medications. Aunt visited and visit went well.

## 2022-08-29 NOTE — PLAN OF CARE
Problem: Sleep Disturbance  Goal: Adequate Sleep/Rest  Outcome: Ongoing, Progressing     Pt appeared to have slept for 7 hours. No complain of pain or discomfort and no PRN medication was given. 15 minutes safety check was in place. Staff will continue to offer support to pt.

## 2022-08-29 NOTE — PLAN OF CARE
08/29/22 1119   Individualization/Patient Specific Goals   Patient Personal Strengths expressive of emotions;insight into illness/situation;expressive of needs;humor;motivated for recovery;motivated for treatment;positive vocational history;positive educational history;resilient   Patient Vulnerabilities adverse childhood experience(s);substance abuse/addiction;history of unsuccessful treatment;legal concerns;occupational insecurity;housing insecurity   Anxieties, Fears or Concerns None   Individualized Care Needs None   Patient-Specific Goals (Include Timeframe) Transfer to Residential treatment   Interprofessional Rounds   Participants CTC;nursing;patient;psychiatrist;OT  (Pharm student, Med students)   Team Discussion   Participants Dr. De Jesus, , Carlos Suresh RN, Emiliana Colunga MA.LP, Pharm student, Med students   Progress Patient has been  stable.  He has been cooperative with meds. No behavioral issues. Patient has been accepted to [facility]- bed available today.  PD completed and signed - faxed to Kiet Browne CM   Anticipated length of stay Discharge today   Continued Stay Criteria/Rationale Patient will transfer to [facility], [place], operated by [facility] today for MI/CD residential treatment   Medical/Physical Stable   Plan DC today.  PD complete.  Patient will be followed by MD at [facility]   Anticipated Discharge Disposition substance use treatment

## 2022-08-29 NOTE — PLAN OF CARE
Pt has been visible in the milieu today, he attends groups that are offered and is social with peers. He endorses some anxiety related to discharge and his legal situation, denies SI/SIB and AH/VH. Pt is med compliant without issue, he has received PRN tylenol, seroquel, and zyprexa x2. Pt is looking forward to leaving today, he discharged to [facility] this afternoon.    Problem: Plan of Care - These are the overarching goals to be used throughout the patient stay.    Goal: Plan of Care Review/Shift Note  Description: The Plan of Care Review/Shift note should be completed every shift.  The Outcome Evaluation is a brief statement about your assessment that the patient is improving, declining, or no change.  This information will be displayed automatically on your shift note.  Outcome: Adequate for Care Transition  Flowsheets (Taken 8/29/2022 1000)  Plan of Care Reviewed With: patient   Goal Outcome Evaluation:    Plan of Care Reviewed With: patient

## 2022-08-29 NOTE — DISCHARGE SUMMARY
"    Psychiatric Discharge Summary    Hospital Day #44    See Mendenhall MRN# [id number]   Age: 31 year old YOB: 1990     Date of Admission:  7/14/2022  Date of Discharge:  8/29/2022  Admitting Physician:  Galina De Jesus MD  Discharge Physician:  Galina De Jesus MD         Event Leading to Hospitalization:   \"See Mendenhall is a 31 year old gentleman with a past psychiatric diagnosis of  Bipolar 2 disorder, substance use disorder, depressive disorder and anxiety disorder PTSD and ADHD. He was admitted from the  ER on 07/16/2022 where he was brought by the police due to concern for out of control/disorganized behaviors and psychosis, and SI with plan of jumping from building or running into traffic. This in the context of methamphetamine and cocaine use, unclear regarding medication adherence, he has significant history of substance use and psychosocial stressors including housing instability, legal issues, trauma and chronic mental health issues.\"       See Admission note by Dr. Dameon MD on 07/16 for additional details.          Diagnoses:   #Primary Psychiatric Diagnosis  Substance-induced psychosis    #Secondary Psychiatric Diagnosis  Substance use disorder (stimulant, opioid, sedative hypnotic)   Major depressive disorder, recurrent episode, moderate   Generalized anxiety disorder    Diagnostic Impression:   See Mendenhall is a 31 year old gentleman with a past psychiatric diagnosis of  Bipolar 2 disorder, substance use disorder, depressive disorder and anxiety disorder, PTSD and ADHD. He was admitted from the ER on 07/16/2022 where he was brought by the police due to concern for psychosis and SI with plan. This in the context of methamphetamine and cocaine use, unclear regarding medication non-adherence, he has significant history of substance use and psychosocial stressors including housing instability, unemployment, legal issues, trauma and chronic mental health issues.    He was brought to " the ED with SI, erratic and impulsive behaviors and psychosis (paranoid delusions and disorganized behavior/thought process) in the context of methamphetamine and cocaine use.  His last psychiatric hospitalization was in April/2022 at [facility] for psychosis in context of substance use.  He is currently followed by PCP Erinn Maradiaga at [facility]. Current psychosocial stressors include legal issues, trauma, chronic mental health issues, family dynamics and medical issues which he has been coping with by using substances, acting out to self and acting out to others.  Patient's support system includes sister Samaria and friend Wilbert.      Substance use does appear to be playing a contributing role in the patient's presentation. Medical history does not appear to be significant, although chart review indicates history of chronic back pain, prescribed anabolic steroids that may be playing a role in presentation. In utero exposure and developmental delay need to be assessed.       Psychosocially history of trauma in childhood, long history of substance use, currently being unemployed and experiencing housing insecurity, legal issues, not being able to see his daughter represent both precipitating and perpetuating factors contributing to presentation.      The MSE is notable for some persisting paranoia, mood lability, being guarded, tangentiality and limited attention span/concentration, hyper-talkativeness difficult to interrupt speech. He denies self injurious behaviors. His reported symptoms of VH, paranoid delusions and grossly disorganized thought process in the context of methamphetamine and cocaine use are suggestive of substance-induced psychosis, although definitive diagnosis is still in evolution and further collateral information from family/ outpatient provider is needed at this time; differential includes primary psychotic disorder exacerbated by substance use, PTSD, Bipolar Disorder,  schizoaffective disorder.  Additionally, he has traits of impulsive behaviors which interfere with his ability to adhere with the treatment plan.             Consults:   -Chemical dependence assessment   --- recommended residential or inpatient treatment program   - Addiction Medicine  ----consulted for suboxone management         Hospital Course:   Psychiatric & Medical Course:  See Mendenhall was admitted to [facility] with attending Galina De Jesus MD on a 72 hour mental health hold. PTA medication were held on admission except for prazosin. Initial presentation concerning for psychosis and SI with plan. Initial presentation consistent with manic episode in the context of polysubstance use. On 07/21 he was transferred to [facility] following a conflict with another patient, but returned to [facility] on 7/28 until he was discharged.   He continued to meet criteria for inpatient hospitalization medication optimization, inpatient stabilization, and appropriate discharge planning.     7/19: added Seroquel 50 mg BEDTIME; 25 mg TID PRN   7/20: Quetiapine (Seroquel) increased to 100 mg, BEDTIME scheduled. Keep 25 mg TID PRN  7/20: melatonin 3mg scheduled per patient request  7/25: Pending confirmation of CARE referral and status on list with outpatient CM. If anticipated to be a prolonged wait for placement, will consider less restrictive options for CD.  7/26: Ongoing issues with outpatient CM Agatha not returning messages or answering calls from team or patient. Message left with CM supervisor given persistent issues reaching CM.  7/27: OP CM strongly advocating for CARE placement given patient's history of several failed treatments and violent/aggressive behaviors in context of substance use. OP CM did confirm that she placed CARE referral. Pt continues to voice frustration about length of stay and delay in CARE referral as he was informed that the referral was placed over one week ago.   7/28: Returned to [facility] from [facility]. CARE placement planned, likely will be available within two weeks. Mr. Mendenhall requests audience with  (Agatha) to explain circumstances of relapse and admission, even if it will not change the course of treatment or discharge to CARE facility, still wishes to clarify details. Changed scheduled SEROQUEL to ZYPREXA 10 mg at bedtime. SEROQUEL ordered PRN  7/29: CARE placement planned, but Mr. Mendenhall continues to request audience with  (Agatha). Should have been placed on list for CARE facility previously as he is on hospital day 13. Will ensure this is done today.  scheduled to visit unit and speak with Mr. Mendenhall on 8/1. Persistent anxiety, request for valium denied due to CD history and need to maintain CARE eligibility. Increased PRN ZYPREXA to accommodate maximum total dose of 40 mg/day (along with scheduled). Complains of shoulder pain s/p rotator cuff surgery and biceps tear in 12/2021, request for tramadol denied due to CD history. Ordered PRN naproxen and PRN lidocaine cream instead.  8/1: Seasonal allergies; ordered PRN Cetirizine 10 mg   8/2: patient requested testosterone supplementation; ordered serum testosterone and SHBG  8/3: Added desvenlafaxine 25 mg daily and added PRN melatonin 3 mg to existing scheduled melatonin 3 mg QPM; Testosterone labs pending.  8/8: Testosterone  & Sex Hormone Binding Globulin levels within normal range; results shared with patient.  8/10: increased desvenlafaxine to 50 mg daily, changed lidocaine gel to patch for biceps pain  8/12: IPCD consult for suboxone and Code 3 exception : \"Please consider exception to patient activities off of secure unit policy (Code 3). Patient would be escorted by security.\"  8/15: Increased PRN Tylenol and Naproxen  8/17: Started suboxone 2-0.5 mg today for two doses; increase according to Dr. Roldan's consult note  8/25: Increased quetiapine at bedtime to 200 mg    8/26: scheduled " quetiapine 200mg at bedtime and added PRN quetiapine 25-50. Accepted to  treatment center.     Risk Assessment:      Today See Mendenhall denies SI, SIB and HI. No overt evidence of psychosis or maría observed. Patient grossly appears to be cognitively intact. Insight and judgement have improved since admission. Patient reports nervousness/eagerness around discharge though denies depressive symptoms or SI with intent or plan.  Patient is aware of consequences of non-adherence with medication regimen and mental health supports . Patient expresses understanding of importance of abstinence from substances and willingness to participate in intensive treatment. Throughout patient's stay they were increasingly cooperative and insightful.  See has notable risk factors for self-harm, including single status, anxiety, past psychotic episodes and hx of substance abuse. However, risk is mitigated by commitment to family (daughter), sobriety, history of seeking help when needed and discharge to  treatment facility. Patient does not have immediate access to firearms. Therefore, based on all available evidence including the factors cited above, See does not appear to be at imminent risk for self-harm, does not meet criteria for a 72-hr hold, and therefore remains appropriate for ongoing outpatient level of care.  They agreed to further reduce risk of self-harm by engaging in  treatment and agreed to remain medication adherent. Additional steps taken to minimize risk include: medication optimization, close psychiatric follow up and provision of crisis resources. Patient expressed understanding of risk associated with substance use relapse including increased risk of harm to self or others.     See was released to  treatment facility. During this admission, he did participate in groups and was visible in the milieu, and his symptoms of psychosis and SI w/plan improved. At the time of discharge they were determined to  "not be a danger to themself or others.     This document serves as a transfer of care to See Mendenhall's outpatient providers.         Discharge Medications:     Current Discharge Medication List      CONTINUE these medications which have NOT CHANGED    Details   albuterol (PROAIR HFA/PROVENTIL HFA/VENTOLIN HFA) 108 (90 Base) MCG/ACT inhaler Inhale 1-2 puffs into the lungs every 6 hours as needed for shortness of breath / dyspnea or wheezing      amphetamine-dextroamphetamine (ADDERALL) 20 MG tablet Take 20 mg by mouth 3 times daily      anastrozole (ARIMIDEX) 1 MG tablet Take 1 mg by mouth daily      desvenlafaxine (PRISTIQ) 50 MG 24 hr tablet Take 50 mg by mouth daily      diazepam (VALIUM) 2 MG tablet Take 2 mg by mouth daily as needed for anxiety      prazosin (MINIPRESS) 1 MG capsule Take 1 mg by mouth At Bedtime       testosterone cypionate (DEPOTESTOSTERONE) 200 MG/ML injection Inject 200 mg into the muscle once a week                  Psychiatric Examination:   General: Awake and alert, sitting comfortably in chair, in NAD   Appearance:  Appears stated age, wearing street clothes   Behavior:  calm, cooperative and engaged  Eye Contact:  adequate  Psychomotor:  no abnormal motor symptoms appreciated; no catatonia present  Speech:  appropriate volume/tone, talkative and with good articulation  Language: Fluent in English with appropriate syntax and vocabulary.  Mood:  \"excited to leave\"  Affect:  appropriate, congruent with mood and broad/full  Thought Process:  linear and goal-directed, futuristic thinking   Thought Content: No SI; No apparent delusions  Associations:  intact  Insight:  good due to recognizing MH diagnoses, need for medication and CD treatment to focus on sobriety   Judgment:  good due to willingness to engage with psychiatric treatment, manage medications and go to CD tx   Attention Span: adequate for conversation  Concentration:  grossly intact  Recent and Remote Memory:  not formally " assessed  Fund of Knowledge: average          Discharge Plan:   Disposition:  [facility]    Main Diagnosis:   Bipolar Affective Disorder  Polysubstance Use Disorder    Health Care Follow-up:   Appointments:  Your meds will be managed by Physician at [place]  Your care team at [facility] and [facility]  will ensure that you are set up with outpatient Psychiatry/therapy.      Pt seen and discussed with my attending physician, Dr. Neal MD.   Josi Smith MD  PGY-1 Psychiatry Resident    Attestation:   The patient has been seen and evaluated by me,  Galina De Jesus MD. I have examined the patient today and reviewed the discharge plan with the resident and medical student. I agree with the final assessment and plan, as noted in the discharge summary. I have reviewed today's vital signs, medications, labs and imaging.  Total time discharge plannin minutes  Galina De Jesus MD ,Ph.D.            Appendix A: All Labs This Admission:     Results for orders placed or performed during the hospital encounter of 22   Asymptomatic COVID-19 Virus (Coronavirus) by PCR Nasopharyngeal     Status: Normal    Specimen: Nasopharyngeal; Swab   Result Value Ref Range    SARS CoV2 PCR Negative Negative, Testing sent to reference lab. Results will be returned via unsolicited result    Narrative    Testing was performed using the Xpert Xpress SARS-CoV-2 Assay on the  Cepheid Gene-Xpert Instrument Systems. Additional information about  this Emergency Use Authorization (EUA) assay can be found via the Lab  Guide. This test should be ordered for the detection of SARS-CoV-2 in  individuals who meet SARS-CoV-2 clinical and/or epidemiological  criteria. Test performance is unknown in asymptomatic patients. This  test is for in vitro diagnostic use under the FDA EUA for  laboratories certified under CLIA to perform high complexity testing.  This test has not been FDA cleared or approved. A negative result  does  not rule out the presence of PCR inhibitors in the specimen or  target RNA in concentration below the limit of detection for the  assay. The possibility of a false negative should be considered if  the patient's recent exposure or clinical presentation suggests  COVID-19. This test was validated by the [facility]. This laboratory is certified under  the Clinical Laboratory Improvement Amendments of 1988 (CLIA-88) as  qualified to perform high complexity laboratory testing.     Drug abuse screen 1 urine (ED)     Status: Abnormal   Result Value Ref Range    Amphetamines Urine Screen Positive (A) Screen Negative    Barbituates Urine Screen Negative Screen Negative    Benzodiazepine Urine Screen Negative Screen Negative    Cannabinoids Urine Screen Negative Screen Negative    Cocaine Urine Screen Positive (A) Screen Negative    Opiates Urine Screen Negative Screen Negative   UA reflex to Microscopic     Status: Normal   Result Value Ref Range    Color Urine Light Yellow Colorless, Straw, Light Yellow, Yellow    Appearance Urine Clear Clear    Glucose Urine Negative Negative mg/dL    Bilirubin Urine Negative Negative    Ketones Urine Negative Negative mg/dL    Specific Gravity Urine 1.019 1.003 - 1.035    Blood Urine Negative Negative    pH Urine 6.5 5.0 - 7.0    Protein Albumin Urine Negative Negative mg/dL    Urobilinogen Urine Normal Normal, 2.0 mg/dL    Nitrite Urine Negative Negative    Leukocyte Esterase Urine Negative Negative    Narrative    Microscopic not indicated   Vitamin B12     Status: Normal   Result Value Ref Range    Vitamin B12 515 232 - 1,245 pg/mL   TSH with free T4 reflex and/or T3 as indicated     Status: Normal   Result Value Ref Range    TSH 0.57 0.40 - 4.00 mU/L   Treponema Abs w Reflex to RPR and Titer     Status: Normal   Result Value Ref Range    Treponema Antibody Total Nonreactive Nonreactive   Lyme Disease Total Abs Bld with Reflex to  Confirm CLIA     Status: Normal   Result Value Ref Range    Lyme Disease Antibodies Total 0.11 <0.90   Lipid panel     Status: Normal   Result Value Ref Range    Cholesterol 123 <200 mg/dL    Triglycerides 116 <150 mg/dL    Direct Measure HDL 46 >=40 mg/dL    LDL Cholesterol Calculated 54 <=100 mg/dL    Non HDL Cholesterol 77 <130 mg/dL    Narrative    Cholesterol  Desirable:  <200 mg/dL    Triglycerides  Normal:  Less than 150 mg/dL  Borderline High:  150-199 mg/dL  High:  200-499 mg/dL  Very High:  Greater than or equal to 500 mg/dL    Direct Measure HDL  Female:  Greater than or equal to 50 mg/dL   Male:  Greater than or equal to 40 mg/dL    LDL Cholesterol  Desirable:  <100mg/dL  Above Desirable:  100-129 mg/dL   Borderline High:  130-159 mg/dL   High:  160-189 mg/dL   Very High:  >= 190 mg/dL    Non HDL Cholesterol  Desirable:  130 mg/dL  Above Desirable:  130-159 mg/dL  Borderline High:  160-189 mg/dL  High:  190-219 mg/dL  Very High:  Greater than or equal to 220 mg/dL   HIV Antigen Antibody Combo     Status: Normal   Result Value Ref Range    HIV Antigen Antibody Combo Nonreactive Nonreactive   Hemoglobin A1c     Status: Abnormal   Result Value Ref Range    Hemoglobin A1C 5.8 (H) 0.0 - 5.6 %   Comprehensive metabolic panel     Status: Abnormal   Result Value Ref Range    Sodium 140 133 - 144 mmol/L    Potassium 4.1 3.4 - 5.3 mmol/L    Chloride 107 94 - 109 mmol/L    Carbon Dioxide (CO2) 26 20 - 32 mmol/L    Anion Gap 7 3 - 14 mmol/L    Urea Nitrogen 18 7 - 30 mg/dL    Creatinine 0.77 0.66 - 1.25 mg/dL    Calcium 8.5 8.5 - 10.1 mg/dL    Glucose 90 70 - 99 mg/dL    Alkaline Phosphatase 68 40 - 150 U/L    AST 18 0 - 45 U/L    ALT 29 0 - 70 U/L    Protein Total 6.7 (L) 6.8 - 8.8 g/dL    Albumin 3.5 3.4 - 5.0 g/dL    Bilirubin Total 0.4 0.2 - 1.3 mg/dL    GFR Estimate >90 >60 mL/min/1.73m2   Ceruloplasmin     Status: Normal   Result Value Ref Range    Ceruloplasmin 24 20 - 60 mg/dL   CBC with platelets and  differential     Status: Abnormal   Result Value Ref Range    WBC Count 5.6 4.0 - 11.0 10e3/uL    RBC Count 4.23 (L) 4.40 - 5.90 10e6/uL    Hemoglobin 12.8 (L) 13.3 - 17.7 g/dL    Hematocrit 38.4 (L) 40.0 - 53.0 %    MCV 91 78 - 100 fL    MCH 30.3 26.5 - 33.0 pg    MCHC 33.3 31.5 - 36.5 g/dL    RDW 13.3 10.0 - 15.0 %    Platelet Count 210 150 - 450 10e3/uL    % Neutrophils 49 %    % Lymphocytes 39 %    % Monocytes 9 %    % Eosinophils 3 %    % Basophils 0 %    % Immature Granulocytes 0 %    NRBCs per 100 WBC 0 <1 /100    Absolute Neutrophils 2.7 1.6 - 8.3 10e3/uL    Absolute Lymphocytes 2.2 0.8 - 5.3 10e3/uL    Absolute Monocytes 0.5 0.0 - 1.3 10e3/uL    Absolute Eosinophils 0.2 0.0 - 0.7 10e3/uL    Absolute Basophils 0.0 0.0 - 0.2 10e3/uL    Absolute Immature Granulocytes 0.0 <=0.4 10e3/uL    Absolute NRBCs 0.0 10e3/uL   Folate     Status: Normal   Result Value Ref Range    Folic Acid 7.8 4.6 - 34.8 ng/mL   Asymptomatic COVID-19 Virus (Coronavirus) by PCR Nose     Status: Normal    Specimen: Nose; Swab   Result Value Ref Range    SARS CoV2 PCR Negative Negative    Narrative    Testing was performed using the eden  SARS-CoV-2 & Influenza A/B Assay on the eden  [employer]anna  System.  This test should be ordered for the detection of SARS-COV-2 in individuals who meet SARS-CoV-2 clinical and/or epidemiological criteria. Test performance is unknown in asymptomatic patients.  This test is for in vitro diagnostic use under the FDA EUA for laboratories certified under CLIA to perform moderate and/or high complexity testing. This test has not been FDA cleared or approved.  A negative test does not rule out the presence of PCR inhibitors in the specimen or target RNA in concentration below the limit of detection for the assay. The possibility of a false negative should be considered if the patient's recent exposure or clinical presentation suggests COVID-19.  [facility] [employer] are certified under the Clinical  Laboratory Improvement Amendments of 1988 (CLIA-88) as qualified to perform moderate and/or high complexity laboratory testing.   Asymptomatic COVID-19 Virus (Coronavirus) by PCR Nasopharyngeal     Status: Normal    Specimen: Nasopharyngeal; Swab   Result Value Ref Range    SARS CoV2 PCR Negative Negative, Testing sent to reference lab. Results will be returned via unsolicited result    Narrative    Testing was performed using the Xpert Xpress SARS-CoV-2 Assay on the  Cepheid Gene-Xpert Instrument Systems. Additional information about  this Emergency Use Authorization (EUA) assay can be found via the Lab  Guide. This test should be ordered for the detection of SARS-CoV-2 in  individuals who meet SARS-CoV-2 clinical and/or epidemiological  criteria. Test performance is unknown in asymptomatic patients. This  test is for in vitro diagnostic use under the FDA EUA for  laboratories certified under CLIA to perform high complexity testing.  This test has not been FDA cleared or approved. A negative result  does not rule out the presence of PCR inhibitors in the specimen or  target RNA in concentration below the limit of detection for the  assay. The possibility of a false negative should be considered if  the patient's recent exposure or clinical presentation suggests  COVID-19. This test was validated by the [facility]. This laboratory is certified under  the Clinical Laboratory Improvement Amendments of 1988 (CLIA-88) as  qualified to perform high complexity laboratory testing.     Sex Hormone Binding Globulin     Status: Normal   Result Value Ref Range    Sex Hormone Binding Globulin 13 11 - 80 nmol/L   Testosterone Free and Total     Status: None   Result Value Ref Range    Free Testosterone Calculated 13.51 ng/dL    Testosterone Total 436 240 - 950 ng/dL    Narrative    This test was developed and its performance characteristics determined by the [facility]  [facility],  Special Chemistry Laboratory. It has not been cleared or approved by the FDA. The laboratory is regulated under CLIA as qualified to perform high-complexity testing. This test is used for clinical purposes. It should not be regarded as investigational or for research.   Asymptomatic COVID-19 Virus (Coronavirus) by PCR Nose     Status: Normal    Specimen: Nose; Swab   Result Value Ref Range    SARS CoV2 PCR Negative Negative    Narrative    Testing was performed using the eden  SARS-CoV-2 & Influenza A/B Assay on the eden  Tamanna  System.  This test should be ordered for the detection of SARS-COV-2 in individuals who meet SARS-CoV-2 clinical and/or epidemiological criteria. Test performance is unknown in asymptomatic patients.  This test is for in vitro diagnostic use under the FDA EUA for laboratories certified under CLIA to perform moderate and/or high complexity testing. This test has not been FDA cleared or approved.  A negative test does not rule out the presence of PCR inhibitors in the specimen or target RNA in concentration below the limit of detection for the assay. The possibility of a false negative should be considered if the patient's recent exposure or clinical presentation suggests COVID-19.  [facility] are certified under the Clinical Laboratory Improvement Amendments of 1988 (CLIA-88) as qualified to perform moderate and/or high complexity laboratory testing.   EKG 12-lead, tracing only     Status: None   Result Value Ref Range    Systolic Blood Pressure  mmHg    Diastolic Blood Pressure  mmHg    Ventricular Rate 108 BPM    Atrial Rate 108 BPM    AZ Interval 128 ms    QRS Duration 86 ms     ms    QTc 621 ms    P Axis 70 degrees    R AXIS 13 degrees    T Axis 65 degrees    Interpretation ECG       Sinus tachycardia  Minimal voltage criteria for LVH, may be normal variant  ST & T wave abnormality, consider inferior ischemia  Abnormal ECG  Unconfirmed  report - interpretation of this ECG is computer generated - see medical record for final interpretation  Confirmed by - EMERGENCY ROOM, PHYSICIAN ([id number]),  RANDY MITCHELL ([id number]) on 7/14/2022 1:11:02 PM     EKG 12-lead, complete     Status: None   Result Value Ref Range    Systolic Blood Pressure  mmHg    Diastolic Blood Pressure  mmHg    Ventricular Rate 73 BPM    Atrial Rate 73 BPM    OR Interval 90 ms    QRS Duration 84 ms     ms    QTc 482 ms    P Axis 36 degrees    R AXIS 36 degrees    T Axis 46 degrees    Interpretation ECG       Sinus rhythm with short OR  Prolonged QT  Abnormal ECG  When compared with ECG of 16-JUL-2022 09:05, (unconfirmed)  Sinus rhythm has replaced Junctional rhythm  Confirmed by MD NAVA DAVID ([id number]) on 7/17/2022 11:46:27 AM     EKG 12-lead, complete     Status: None   Result Value Ref Range    Systolic Blood Pressure  mmHg    Diastolic Blood Pressure  mmHg    Ventricular Rate 83 BPM    Atrial Rate 83 BPM    OR Interval 154 ms    QRS Duration 82 ms     ms    QTc 439 ms    P Axis 57 degrees    R AXIS -2 degrees    T Axis 20 degrees    Interpretation ECG       Sinus rhythm  Increased R/S ratio in V1, consider early transition or posterior infarct  Abnormal ECG  When compared with ECG of 16-JUL-2022 09:06,  No significant change was found  Confirmed by Justine Steward ([id number]) on 8/18/2022 4:22:11 PM     Urine Drugs of Abuse Screen     Status: Abnormal    Narrative    The following orders were created for panel order Urine Drugs of Abuse Screen.  Procedure                               Abnormality         Status                     ---------                               -----------         ------                     Drug abuse screen 1 urin...[[id number]]  Abnormal            Final result                 Please view results for these tests on the individual orders.   CBC with platelets differential     Status: Abnormal    Narrative    The following orders were created  for panel order CBC with platelets differential.  Procedure                               Abnormality         Status                     ---------                               -----------         ------                     CBC with platelets and d...[[id number]]  Abnormal            Final result                 Please view results for these tests on the individual orders.   Testosterone Free and Total     Status: None    Narrative    The following orders were created for panel order Testosterone Free and Total.  Procedure                               Abnormality         Status                     ---------                               -----------         ------                     Sex Hormone Binding Glob...[[id number]]  Normal              Final result               Testosterone Free and Total[[id number]]                      Final result                 Please view results for these tests on the individual orders.

## 2022-08-31 ENCOUNTER — PATIENT OUTREACH (OUTPATIENT)
Dept: CARE COORDINATION | Facility: CLINIC | Age: 32
End: 2022-08-31

## 2022-08-31 NOTE — PROGRESS NOTES
[facility]    Background: Transitional Care Management program auto-identified and prompting a chart review by [facility] Center team.    Assessment: Upon chart review, [facility] Team member will cancel/close this episode of Transitional Care Management program due to reason below:    Patient discharged to [facility].    Plan: Transitional Care Management episode closed per reason above.      Nora Valenzuela MA  [facility] Resource [place], [facility]    *Connected Care Resource Team does NOT follow patient ongoing. Referrals are identified based on internal discharge reports and the outreach is to ensure patient has an understanding of their discharge instructions.

## 2022-10-03 ENCOUNTER — HEALTH MAINTENANCE LETTER (OUTPATIENT)
Age: 32
End: 2022-10-03

## 2023-02-11 ENCOUNTER — HEALTH MAINTENANCE LETTER (OUTPATIENT)
Age: 33
End: 2023-02-11

## 2023-05-18 NOTE — PROGRESS NOTES
08/16/22 0600   Group Therapy Session   Group Attendance attended group session   Time Session Began 1115   Time Session Ended 1200   Total Time patient participated (minutes) 45   Total # Attendees 5   Group Type expressive therapy   Group Topic Covered emotions/expression   Patient Response/Contribution cooperative with task   Art Therapy directive was to answer questions on a handout related to pts relationship with the five senses and the natural environment. Pts were then encouraged to create art in response to their answers on handout using art media of pts choice.  Goals of directive: to create a self narrative, to explore aspects of self, to explore nature and/or sensory experiences, mindfulness, emotional regulation  Pt was an engaged participant, focused on task for the full duration of group. Pt shared his answers to various questions with author and group. Pt created a painting in response to handout.  Pts mood was calm, pleasant participant.   VOICEMAIL  1. Caller Name:     FRANCISCO WU                      Call Back Number: [phone] (work)      2. Message: Mom called asking for a call back regarding June's Lab results     3. Patient approves office to leave a detailed voicemail/MyChart message: yes
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
## 2023-07-24 NOTE — PLAN OF CARE
[facility] Occupational Therapy Progress Note     08/10/22 1028   General Information   Special Considerations See participates in ~75% of varying therapeutic programming. Below is a summary of current presentations.   Clinical Impression   Affect Appropriate to situation   Orientation Oriented to person, place and time   Appearance and ADLs Neatly groomed   Attention to Internal Stimuli No observed signs   Interaction Skills Interacts appropriately with staff;Interacts appropriately with peers   Ability to Communicate Needs Independent   Verbal Content Articulate;Clear;Appropriate to topic   Ability to Maintain Boundaries Maintains appropriate physical boundaries;Maintains appropriate verbal boundaries   Participation Initiates participation;Independently participates   Concentration Concentrates 50 minutes   Ability to Concentrate Without difficulty   Follows and Comprehends Directions Independently follows multi-step directions   Memory Delayed and immediate recall intact   Organization Independently organizes all tasks   Decision Making Independent   Planning and Problem Solving Independently plans ahead   Ability to Apply and Learn Concepts Applies within group structure   Frustrations / Stress Tolerance Independently identifies sources of frustration/stress;Independently identifies and applies coping skills   Level of Insight Some insight  (has demonstrated an increase in expressed insight since admission)   Self Esteem Can identify positives   Social Supports Has knowledge of support systems     Pt would benefit from continued engagement in OT groups that support healthy recovery, specifically exploration of positive coping skills for symptom management/relapse prevention. Continue to provide graded occupation-based activities for increased success and ongoing assessment.     Opal Jaeger, OT on 8/10/2022 at 10:32 AM     Complex Repair And Graft Additional Text (Will Appearing After The Standard Complex Repair Text): The complex repair was not sufficient to completely close the primary defect. The remaining additional defect was repaired with the graft mentioned below.

## 2024-02-09 ENCOUNTER — TELEPHONE (OUTPATIENT)
Dept: FAMILY MEDICINE | Facility: CLINIC | Age: 34
End: 2024-02-09

## 2024-04-20 ENCOUNTER — HEALTH MAINTENANCE LETTER (OUTPATIENT)
Age: 34
End: 2024-04-20

## 2024-06-17 PROBLEM — Z76.89 HEALTH CARE HOME: Status: RESOLVED | Noted: 2021-09-08 | Resolved: 2024-06-17

## 2025-05-10 ENCOUNTER — HEALTH MAINTENANCE LETTER (OUTPATIENT)
Age: 35
End: 2025-05-10